# Patient Record
Sex: MALE | Race: WHITE | NOT HISPANIC OR LATINO | URBAN - METROPOLITAN AREA
[De-identification: names, ages, dates, MRNs, and addresses within clinical notes are randomized per-mention and may not be internally consistent; named-entity substitution may affect disease eponyms.]

---

## 2017-01-04 ENCOUNTER — OUTPATIENT (OUTPATIENT)
Dept: OUTPATIENT SERVICES | Facility: HOSPITAL | Age: 72
LOS: 1 days | End: 2017-01-04
Payer: MEDICARE

## 2017-01-04 DIAGNOSIS — H26.8 OTHER SPECIFIED CATARACT: Chronic | ICD-10-CM

## 2017-01-04 DIAGNOSIS — G70.00 MYASTHENIA GRAVIS WITHOUT (ACUTE) EXACERBATION: ICD-10-CM

## 2017-01-04 LAB
HCT VFR BLD CALC: 44.1 % — SIGNIFICANT CHANGE UP (ref 39–50)
HGB BLD-MCNC: 14.8 G/DL — SIGNIFICANT CHANGE UP (ref 13–17)
MCHC RBC-ENTMCNC: 32.9 PG — SIGNIFICANT CHANGE UP (ref 27–34)
MCHC RBC-ENTMCNC: 33.5 GM/DL — SIGNIFICANT CHANGE UP (ref 32–36)
MCV RBC AUTO: 98.2 FL — SIGNIFICANT CHANGE UP (ref 80–100)
PLATELET # BLD AUTO: 234 K/UL — SIGNIFICANT CHANGE UP (ref 150–400)
RBC # BLD: 4.48 M/UL — SIGNIFICANT CHANGE UP (ref 4.2–5.8)
RBC # FLD: 13.3 % — SIGNIFICANT CHANGE UP (ref 10.3–14.5)
WBC # BLD: 12.7 K/UL — HIGH (ref 3.8–10.5)
WBC # FLD AUTO: 12.7 K/UL — HIGH (ref 3.8–10.5)

## 2017-01-04 PROCEDURE — P9041: CPT

## 2017-01-04 PROCEDURE — 99214 OFFICE O/P EST MOD 30 MIN: CPT | Mod: 25

## 2017-01-04 PROCEDURE — 36430 TRANSFUSION BLD/BLD COMPNT: CPT

## 2017-01-04 PROCEDURE — 85027 COMPLETE CBC AUTOMATED: CPT

## 2017-01-04 PROCEDURE — P9045: CPT

## 2017-01-04 PROCEDURE — 36514 APHERESIS PLASMA: CPT

## 2017-01-06 ENCOUNTER — OUTPATIENT (OUTPATIENT)
Dept: OUTPATIENT SERVICES | Facility: HOSPITAL | Age: 72
LOS: 1 days | End: 2017-01-06
Payer: MEDICARE

## 2017-01-06 DIAGNOSIS — G70.00 MYASTHENIA GRAVIS WITHOUT (ACUTE) EXACERBATION: ICD-10-CM

## 2017-01-06 DIAGNOSIS — H26.8 OTHER SPECIFIED CATARACT: Chronic | ICD-10-CM

## 2017-01-06 LAB
HCT VFR BLD CALC: 42.9 % — SIGNIFICANT CHANGE UP (ref 39–50)
HGB BLD-MCNC: 15.3 G/DL — SIGNIFICANT CHANGE UP (ref 13–17)
MCHC RBC-ENTMCNC: 35.1 PG — HIGH (ref 27–34)
MCHC RBC-ENTMCNC: 35.6 GM/DL — SIGNIFICANT CHANGE UP (ref 32–36)
MCV RBC AUTO: 98.5 FL — SIGNIFICANT CHANGE UP (ref 80–100)
PLATELET # BLD AUTO: 204 K/UL — SIGNIFICANT CHANGE UP (ref 150–400)
RBC # BLD: 4.35 M/UL — SIGNIFICANT CHANGE UP (ref 4.2–5.8)
RBC # FLD: 13.4 % — SIGNIFICANT CHANGE UP (ref 10.3–14.5)
WBC # BLD: 15.2 K/UL — HIGH (ref 3.8–10.5)
WBC # FLD AUTO: 15.2 K/UL — HIGH (ref 3.8–10.5)

## 2017-01-06 PROCEDURE — 99214 OFFICE O/P EST MOD 30 MIN: CPT | Mod: 25

## 2017-01-06 PROCEDURE — 85027 COMPLETE CBC AUTOMATED: CPT

## 2017-01-06 PROCEDURE — P9041: CPT

## 2017-01-06 PROCEDURE — P9045: CPT

## 2017-01-06 PROCEDURE — 36514 APHERESIS PLASMA: CPT

## 2017-01-09 ENCOUNTER — OUTPATIENT (OUTPATIENT)
Dept: OUTPATIENT SERVICES | Facility: HOSPITAL | Age: 72
LOS: 1 days | End: 2017-01-09
Payer: MEDICARE

## 2017-01-09 DIAGNOSIS — G70.00 MYASTHENIA GRAVIS WITHOUT (ACUTE) EXACERBATION: ICD-10-CM

## 2017-01-09 DIAGNOSIS — H26.8 OTHER SPECIFIED CATARACT: Chronic | ICD-10-CM

## 2017-01-09 LAB
HCT VFR BLD CALC: 42 % — SIGNIFICANT CHANGE UP (ref 39–50)
HGB BLD-MCNC: 14.4 G/DL — SIGNIFICANT CHANGE UP (ref 13–17)
MCHC RBC-ENTMCNC: 34 PG — SIGNIFICANT CHANGE UP (ref 27–34)
MCHC RBC-ENTMCNC: 34.3 GM/DL — SIGNIFICANT CHANGE UP (ref 32–36)
MCV RBC AUTO: 99 FL — SIGNIFICANT CHANGE UP (ref 80–100)
PLATELET # BLD AUTO: 189 K/UL — SIGNIFICANT CHANGE UP (ref 150–400)
RBC # BLD: 4.24 M/UL — SIGNIFICANT CHANGE UP (ref 4.2–5.8)
RBC # FLD: 13.6 % — SIGNIFICANT CHANGE UP (ref 10.3–14.5)
WBC # BLD: 12.5 K/UL — HIGH (ref 3.8–10.5)
WBC # FLD AUTO: 12.5 K/UL — HIGH (ref 3.8–10.5)

## 2017-01-09 PROCEDURE — 99214 OFFICE O/P EST MOD 30 MIN: CPT | Mod: 25

## 2017-01-09 PROCEDURE — 85027 COMPLETE CBC AUTOMATED: CPT

## 2017-01-09 PROCEDURE — P9045: CPT

## 2017-01-09 PROCEDURE — P9041: CPT

## 2017-01-09 PROCEDURE — 36514 APHERESIS PLASMA: CPT

## 2017-01-23 ENCOUNTER — OUTPATIENT (OUTPATIENT)
Dept: OUTPATIENT SERVICES | Facility: HOSPITAL | Age: 72
LOS: 1 days | End: 2017-01-23
Payer: MEDICARE

## 2017-01-23 DIAGNOSIS — G70.00 MYASTHENIA GRAVIS WITHOUT (ACUTE) EXACERBATION: ICD-10-CM

## 2017-01-23 DIAGNOSIS — H26.8 OTHER SPECIFIED CATARACT: Chronic | ICD-10-CM

## 2017-01-23 LAB
HCT VFR BLD CALC: 39.6 % — SIGNIFICANT CHANGE UP (ref 39–50)
HGB BLD-MCNC: 13.5 G/DL — SIGNIFICANT CHANGE UP (ref 13–17)
MCHC RBC-ENTMCNC: 33.7 PG — SIGNIFICANT CHANGE UP (ref 27–34)
MCHC RBC-ENTMCNC: 34 GM/DL — SIGNIFICANT CHANGE UP (ref 32–36)
MCV RBC AUTO: 99.1 FL — SIGNIFICANT CHANGE UP (ref 80–100)
PLATELET # BLD AUTO: 216 K/UL — SIGNIFICANT CHANGE UP (ref 150–400)
RBC # BLD: 4 M/UL — LOW (ref 4.2–5.8)
RBC # FLD: 13.5 % — SIGNIFICANT CHANGE UP (ref 10.3–14.5)
WBC # BLD: 11.8 K/UL — HIGH (ref 3.8–10.5)
WBC # FLD AUTO: 11.8 K/UL — HIGH (ref 3.8–10.5)

## 2017-01-23 PROCEDURE — P9041: CPT

## 2017-01-23 PROCEDURE — 99214 OFFICE O/P EST MOD 30 MIN: CPT | Mod: 25

## 2017-01-23 PROCEDURE — 85027 COMPLETE CBC AUTOMATED: CPT

## 2017-01-23 PROCEDURE — 36514 APHERESIS PLASMA: CPT

## 2017-01-23 PROCEDURE — P9045: CPT

## 2017-01-25 ENCOUNTER — OUTPATIENT (OUTPATIENT)
Dept: OUTPATIENT SERVICES | Facility: HOSPITAL | Age: 72
LOS: 1 days | End: 2017-01-25
Payer: MEDICARE

## 2017-01-25 DIAGNOSIS — H26.8 OTHER SPECIFIED CATARACT: Chronic | ICD-10-CM

## 2017-01-25 DIAGNOSIS — G70.00 MYASTHENIA GRAVIS WITHOUT (ACUTE) EXACERBATION: ICD-10-CM

## 2017-01-25 LAB
HCT VFR BLD CALC: 44.1 % — SIGNIFICANT CHANGE UP (ref 39–50)
HGB BLD-MCNC: 14.3 G/DL — SIGNIFICANT CHANGE UP (ref 13–17)
MCHC RBC-ENTMCNC: 32.3 PG — SIGNIFICANT CHANGE UP (ref 27–34)
MCHC RBC-ENTMCNC: 32.5 GM/DL — SIGNIFICANT CHANGE UP (ref 32–36)
MCV RBC AUTO: 99.3 FL — SIGNIFICANT CHANGE UP (ref 80–100)
PLATELET # BLD AUTO: 235 K/UL — SIGNIFICANT CHANGE UP (ref 150–400)
RBC # BLD: 4.44 M/UL — SIGNIFICANT CHANGE UP (ref 4.2–5.8)
RBC # FLD: 13.8 % — SIGNIFICANT CHANGE UP (ref 10.3–14.5)
WBC # BLD: 12.8 K/UL — HIGH (ref 3.8–10.5)
WBC # FLD AUTO: 12.8 K/UL — HIGH (ref 3.8–10.5)

## 2017-01-25 PROCEDURE — 99214 OFFICE O/P EST MOD 30 MIN: CPT | Mod: 25

## 2017-01-25 PROCEDURE — 85027 COMPLETE CBC AUTOMATED: CPT

## 2017-01-25 PROCEDURE — 36514 APHERESIS PLASMA: CPT

## 2017-01-25 PROCEDURE — P9041: CPT

## 2017-01-27 ENCOUNTER — OUTPATIENT (OUTPATIENT)
Dept: OUTPATIENT SERVICES | Facility: HOSPITAL | Age: 72
LOS: 1 days | End: 2017-01-27
Payer: MEDICARE

## 2017-01-27 DIAGNOSIS — G70.00 MYASTHENIA GRAVIS WITHOUT (ACUTE) EXACERBATION: ICD-10-CM

## 2017-01-27 DIAGNOSIS — H26.8 OTHER SPECIFIED CATARACT: Chronic | ICD-10-CM

## 2017-01-27 LAB
HCT VFR BLD CALC: 40.8 % — SIGNIFICANT CHANGE UP (ref 39–50)
HGB BLD-MCNC: 13.9 G/DL — SIGNIFICANT CHANGE UP (ref 13–17)
MCHC RBC-ENTMCNC: 33.2 PG — SIGNIFICANT CHANGE UP (ref 27–34)
MCHC RBC-ENTMCNC: 33.9 GM/DL — SIGNIFICANT CHANGE UP (ref 32–36)
MCV RBC AUTO: 97.9 FL — SIGNIFICANT CHANGE UP (ref 80–100)
PLATELET # BLD AUTO: 226 K/UL — SIGNIFICANT CHANGE UP (ref 150–400)
RBC # BLD: 4.17 M/UL — LOW (ref 4.2–5.8)
RBC # FLD: 13.1 % — SIGNIFICANT CHANGE UP (ref 10.3–14.5)
WBC # BLD: 13.6 K/UL — HIGH (ref 3.8–10.5)
WBC # FLD AUTO: 13.6 K/UL — HIGH (ref 3.8–10.5)

## 2017-01-27 PROCEDURE — 36514 APHERESIS PLASMA: CPT

## 2017-01-27 PROCEDURE — P9041: CPT

## 2017-01-27 PROCEDURE — 85027 COMPLETE CBC AUTOMATED: CPT

## 2017-01-27 PROCEDURE — 99214 OFFICE O/P EST MOD 30 MIN: CPT | Mod: 25

## 2017-02-10 ENCOUNTER — OUTPATIENT (OUTPATIENT)
Dept: OUTPATIENT SERVICES | Facility: HOSPITAL | Age: 72
LOS: 1 days | End: 2017-02-10
Payer: MEDICARE

## 2017-02-10 DIAGNOSIS — G70.00 MYASTHENIA GRAVIS WITHOUT (ACUTE) EXACERBATION: ICD-10-CM

## 2017-02-10 DIAGNOSIS — H26.8 OTHER SPECIFIED CATARACT: Chronic | ICD-10-CM

## 2017-02-10 LAB
HCT VFR BLD CALC: 40.5 % — SIGNIFICANT CHANGE UP (ref 39–50)
HGB BLD-MCNC: 13.5 G/DL — SIGNIFICANT CHANGE UP (ref 13–17)
MCHC RBC-ENTMCNC: 32.8 PG — SIGNIFICANT CHANGE UP (ref 27–34)
MCHC RBC-ENTMCNC: 33.2 GM/DL — SIGNIFICANT CHANGE UP (ref 32–36)
MCV RBC AUTO: 98.6 FL — SIGNIFICANT CHANGE UP (ref 80–100)
PLATELET # BLD AUTO: 238 K/UL — SIGNIFICANT CHANGE UP (ref 150–400)
RBC # BLD: 4.1 M/UL — LOW (ref 4.2–5.8)
RBC # FLD: 13 % — SIGNIFICANT CHANGE UP (ref 10.3–14.5)
WBC # BLD: 9.8 K/UL — SIGNIFICANT CHANGE UP (ref 3.8–10.5)
WBC # FLD AUTO: 9.8 K/UL — SIGNIFICANT CHANGE UP (ref 3.8–10.5)

## 2017-02-10 PROCEDURE — 36514 APHERESIS PLASMA: CPT

## 2017-02-10 PROCEDURE — 99214 OFFICE O/P EST MOD 30 MIN: CPT | Mod: 25

## 2017-02-10 PROCEDURE — P9041: CPT

## 2017-02-10 PROCEDURE — 85027 COMPLETE CBC AUTOMATED: CPT

## 2017-02-13 ENCOUNTER — OUTPATIENT (OUTPATIENT)
Dept: OUTPATIENT SERVICES | Facility: HOSPITAL | Age: 72
LOS: 1 days | End: 2017-02-13
Payer: MEDICARE

## 2017-02-13 DIAGNOSIS — H26.8 OTHER SPECIFIED CATARACT: Chronic | ICD-10-CM

## 2017-02-13 DIAGNOSIS — G70.00 MYASTHENIA GRAVIS WITHOUT (ACUTE) EXACERBATION: ICD-10-CM

## 2017-02-13 LAB
HCT VFR BLD CALC: 43.2 % — SIGNIFICANT CHANGE UP (ref 39–50)
HGB BLD-MCNC: 14.1 G/DL — SIGNIFICANT CHANGE UP (ref 13–17)
MCHC RBC-ENTMCNC: 32 PG — SIGNIFICANT CHANGE UP (ref 27–34)
MCHC RBC-ENTMCNC: 32.6 GM/DL — SIGNIFICANT CHANGE UP (ref 32–36)
MCV RBC AUTO: 98.2 FL — SIGNIFICANT CHANGE UP (ref 80–100)
PLATELET # BLD AUTO: 234 K/UL — SIGNIFICANT CHANGE UP (ref 150–400)
RBC # BLD: 4.39 M/UL — SIGNIFICANT CHANGE UP (ref 4.2–5.8)
RBC # FLD: 13.7 % — SIGNIFICANT CHANGE UP (ref 10.3–14.5)
WBC # BLD: 11.4 K/UL — HIGH (ref 3.8–10.5)
WBC # FLD AUTO: 11.4 K/UL — HIGH (ref 3.8–10.5)

## 2017-02-13 PROCEDURE — 85027 COMPLETE CBC AUTOMATED: CPT

## 2017-02-13 PROCEDURE — P9045: CPT

## 2017-02-13 PROCEDURE — P9041: CPT

## 2017-02-13 PROCEDURE — 99214 OFFICE O/P EST MOD 30 MIN: CPT | Mod: 25

## 2017-02-13 PROCEDURE — 36514 APHERESIS PLASMA: CPT

## 2017-02-15 ENCOUNTER — OUTPATIENT (OUTPATIENT)
Dept: OUTPATIENT SERVICES | Facility: HOSPITAL | Age: 72
LOS: 1 days | End: 2017-02-15
Payer: MEDICARE

## 2017-02-15 DIAGNOSIS — H26.8 OTHER SPECIFIED CATARACT: Chronic | ICD-10-CM

## 2017-02-15 DIAGNOSIS — G70.00 MYASTHENIA GRAVIS WITHOUT (ACUTE) EXACERBATION: ICD-10-CM

## 2017-02-15 LAB
HCT VFR BLD CALC: 42.1 % — SIGNIFICANT CHANGE UP (ref 39–50)
HGB BLD-MCNC: 13.9 G/DL — SIGNIFICANT CHANGE UP (ref 13–17)
MCHC RBC-ENTMCNC: 32.4 PG — SIGNIFICANT CHANGE UP (ref 27–34)
MCHC RBC-ENTMCNC: 33.1 GM/DL — SIGNIFICANT CHANGE UP (ref 32–36)
MCV RBC AUTO: 98 FL — SIGNIFICANT CHANGE UP (ref 80–100)
PLATELET # BLD AUTO: 225 K/UL — SIGNIFICANT CHANGE UP (ref 150–400)
RBC # BLD: 4.29 M/UL — SIGNIFICANT CHANGE UP (ref 4.2–5.8)
RBC # FLD: 13.4 % — SIGNIFICANT CHANGE UP (ref 10.3–14.5)
WBC # BLD: 12.4 K/UL — HIGH (ref 3.8–10.5)
WBC # FLD AUTO: 12.4 K/UL — HIGH (ref 3.8–10.5)

## 2017-02-15 PROCEDURE — P9041: CPT

## 2017-02-15 PROCEDURE — 99214 OFFICE O/P EST MOD 30 MIN: CPT | Mod: 25

## 2017-02-15 PROCEDURE — 36514 APHERESIS PLASMA: CPT

## 2017-02-15 PROCEDURE — 85027 COMPLETE CBC AUTOMATED: CPT

## 2017-03-01 ENCOUNTER — OUTPATIENT (OUTPATIENT)
Dept: OUTPATIENT SERVICES | Facility: HOSPITAL | Age: 72
LOS: 1 days | End: 2017-03-01
Payer: MEDICARE

## 2017-03-01 DIAGNOSIS — H26.8 OTHER SPECIFIED CATARACT: Chronic | ICD-10-CM

## 2017-03-01 DIAGNOSIS — G70.00 MYASTHENIA GRAVIS WITHOUT (ACUTE) EXACERBATION: ICD-10-CM

## 2017-03-01 LAB
HCT VFR BLD CALC: 40.5 % — SIGNIFICANT CHANGE UP (ref 39–50)
HGB BLD-MCNC: 13.3 G/DL — SIGNIFICANT CHANGE UP (ref 13–17)
MCHC RBC-ENTMCNC: 32.2 PG — SIGNIFICANT CHANGE UP (ref 27–34)
MCHC RBC-ENTMCNC: 32.9 GM/DL — SIGNIFICANT CHANGE UP (ref 32–36)
MCV RBC AUTO: 97.9 FL — SIGNIFICANT CHANGE UP (ref 80–100)
PLATELET # BLD AUTO: 233 K/UL — SIGNIFICANT CHANGE UP (ref 150–400)
RBC # BLD: 4.14 M/UL — LOW (ref 4.2–5.8)
RBC # FLD: 13.1 % — SIGNIFICANT CHANGE UP (ref 10.3–14.5)
WBC # BLD: 12.6 K/UL — HIGH (ref 3.8–10.5)
WBC # FLD AUTO: 12.6 K/UL — HIGH (ref 3.8–10.5)

## 2017-03-01 PROCEDURE — 99214 OFFICE O/P EST MOD 30 MIN: CPT | Mod: 25

## 2017-03-01 PROCEDURE — P9041: CPT

## 2017-03-01 PROCEDURE — P9045: CPT

## 2017-03-01 PROCEDURE — 85027 COMPLETE CBC AUTOMATED: CPT

## 2017-03-01 PROCEDURE — 36514 APHERESIS PLASMA: CPT

## 2017-03-03 ENCOUNTER — OUTPATIENT (OUTPATIENT)
Dept: OUTPATIENT SERVICES | Facility: HOSPITAL | Age: 72
LOS: 1 days | End: 2017-03-03
Payer: MEDICARE

## 2017-03-03 DIAGNOSIS — H26.8 OTHER SPECIFIED CATARACT: Chronic | ICD-10-CM

## 2017-03-03 DIAGNOSIS — G70.00 MYASTHENIA GRAVIS WITHOUT (ACUTE) EXACERBATION: ICD-10-CM

## 2017-03-03 LAB
HCT VFR BLD CALC: 40.1 % — SIGNIFICANT CHANGE UP (ref 39–50)
HGB BLD-MCNC: 13.7 G/DL — SIGNIFICANT CHANGE UP (ref 13–17)
MCHC RBC-ENTMCNC: 33.2 PG — SIGNIFICANT CHANGE UP (ref 27–34)
MCHC RBC-ENTMCNC: 34.2 GM/DL — SIGNIFICANT CHANGE UP (ref 32–36)
MCV RBC AUTO: 97 FL — SIGNIFICANT CHANGE UP (ref 80–100)
PLATELET # BLD AUTO: 243 K/UL — SIGNIFICANT CHANGE UP (ref 150–400)
RBC # BLD: 4.14 M/UL — LOW (ref 4.2–5.8)
RBC # FLD: 13.2 % — SIGNIFICANT CHANGE UP (ref 10.3–14.5)
WBC # BLD: 11.3 K/UL — HIGH (ref 3.8–10.5)
WBC # FLD AUTO: 11.3 K/UL — HIGH (ref 3.8–10.5)

## 2017-03-03 PROCEDURE — 36514 APHERESIS PLASMA: CPT

## 2017-03-03 PROCEDURE — 85027 COMPLETE CBC AUTOMATED: CPT

## 2017-03-03 PROCEDURE — P9041: CPT

## 2017-03-03 PROCEDURE — 99213 OFFICE O/P EST LOW 20 MIN: CPT | Mod: 25

## 2017-03-06 ENCOUNTER — OUTPATIENT (OUTPATIENT)
Dept: OUTPATIENT SERVICES | Facility: HOSPITAL | Age: 72
LOS: 1 days | End: 2017-03-06
Payer: MEDICARE

## 2017-03-06 DIAGNOSIS — H26.8 OTHER SPECIFIED CATARACT: Chronic | ICD-10-CM

## 2017-03-06 DIAGNOSIS — G70.00 MYASTHENIA GRAVIS WITHOUT (ACUTE) EXACERBATION: ICD-10-CM

## 2017-03-06 LAB
HCT VFR BLD CALC: 40.6 % — SIGNIFICANT CHANGE UP (ref 39–50)
HGB BLD-MCNC: 13.8 G/DL — SIGNIFICANT CHANGE UP (ref 13–17)
MCHC RBC-ENTMCNC: 33.5 PG — SIGNIFICANT CHANGE UP (ref 27–34)
MCHC RBC-ENTMCNC: 34.1 GM/DL — SIGNIFICANT CHANGE UP (ref 32–36)
MCV RBC AUTO: 98.2 FL — SIGNIFICANT CHANGE UP (ref 80–100)
PLATELET # BLD AUTO: 250 K/UL — SIGNIFICANT CHANGE UP (ref 150–400)
RBC # BLD: 4.13 M/UL — LOW (ref 4.2–5.8)
RBC # FLD: 12.9 % — SIGNIFICANT CHANGE UP (ref 10.3–14.5)
WBC # BLD: 14.2 K/UL — HIGH (ref 3.8–10.5)
WBC # FLD AUTO: 14.2 K/UL — HIGH (ref 3.8–10.5)

## 2017-03-06 PROCEDURE — 36514 APHERESIS PLASMA: CPT

## 2017-03-06 PROCEDURE — 99213 OFFICE O/P EST LOW 20 MIN: CPT | Mod: 25

## 2017-03-07 PROCEDURE — P9041: CPT

## 2017-03-07 PROCEDURE — 85027 COMPLETE CBC AUTOMATED: CPT

## 2017-03-07 PROCEDURE — P9045: CPT

## 2017-03-07 PROCEDURE — 36514 APHERESIS PLASMA: CPT

## 2017-03-20 ENCOUNTER — OUTPATIENT (OUTPATIENT)
Dept: OUTPATIENT SERVICES | Facility: HOSPITAL | Age: 72
LOS: 1 days | End: 2017-03-20
Payer: MEDICARE

## 2017-03-20 DIAGNOSIS — H26.8 OTHER SPECIFIED CATARACT: Chronic | ICD-10-CM

## 2017-03-20 DIAGNOSIS — G70.00 MYASTHENIA GRAVIS WITHOUT (ACUTE) EXACERBATION: ICD-10-CM

## 2017-03-20 LAB
HCT VFR BLD CALC: 41.2 % — SIGNIFICANT CHANGE UP (ref 39–50)
HGB BLD-MCNC: 13.6 G/DL — SIGNIFICANT CHANGE UP (ref 13–17)
MCHC RBC-ENTMCNC: 32.7 PG — SIGNIFICANT CHANGE UP (ref 27–34)
MCHC RBC-ENTMCNC: 33 GM/DL — SIGNIFICANT CHANGE UP (ref 32–36)
MCV RBC AUTO: 99 FL — SIGNIFICANT CHANGE UP (ref 80–100)
PLATELET # BLD AUTO: 252 K/UL — SIGNIFICANT CHANGE UP (ref 150–400)
RBC # BLD: 4.16 M/UL — LOW (ref 4.2–5.8)
RBC # FLD: 13.1 % — SIGNIFICANT CHANGE UP (ref 10.3–14.5)
WBC # BLD: 11.9 K/UL — HIGH (ref 3.8–10.5)
WBC # FLD AUTO: 11.9 K/UL — HIGH (ref 3.8–10.5)

## 2017-03-20 PROCEDURE — 99214 OFFICE O/P EST MOD 30 MIN: CPT | Mod: 25

## 2017-03-20 PROCEDURE — 36514 APHERESIS PLASMA: CPT

## 2017-03-20 PROCEDURE — P9045: CPT

## 2017-03-20 PROCEDURE — 85027 COMPLETE CBC AUTOMATED: CPT

## 2017-03-20 PROCEDURE — P9041: CPT

## 2017-03-22 ENCOUNTER — OUTPATIENT (OUTPATIENT)
Dept: OUTPATIENT SERVICES | Facility: HOSPITAL | Age: 72
LOS: 1 days | End: 2017-03-22
Payer: MEDICARE

## 2017-03-22 DIAGNOSIS — G70.00 MYASTHENIA GRAVIS WITHOUT (ACUTE) EXACERBATION: ICD-10-CM

## 2017-03-22 DIAGNOSIS — H26.8 OTHER SPECIFIED CATARACT: Chronic | ICD-10-CM

## 2017-03-22 LAB
HCT VFR BLD CALC: 40.7 % — SIGNIFICANT CHANGE UP (ref 39–50)
HGB BLD-MCNC: 13.8 G/DL — SIGNIFICANT CHANGE UP (ref 13–17)
MCHC RBC-ENTMCNC: 33.5 PG — SIGNIFICANT CHANGE UP (ref 27–34)
MCHC RBC-ENTMCNC: 33.8 GM/DL — SIGNIFICANT CHANGE UP (ref 32–36)
MCV RBC AUTO: 99 FL — SIGNIFICANT CHANGE UP (ref 80–100)
PLATELET # BLD AUTO: 221 K/UL — SIGNIFICANT CHANGE UP (ref 150–400)
RBC # BLD: 4.11 M/UL — LOW (ref 4.2–5.8)
RBC # FLD: 13.6 % — SIGNIFICANT CHANGE UP (ref 10.3–14.5)
WBC # BLD: 13.6 K/UL — HIGH (ref 3.8–10.5)
WBC # FLD AUTO: 13.6 K/UL — HIGH (ref 3.8–10.5)

## 2017-03-22 PROCEDURE — 36514 APHERESIS PLASMA: CPT

## 2017-03-22 PROCEDURE — P9045: CPT

## 2017-03-22 PROCEDURE — 99214 OFFICE O/P EST MOD 30 MIN: CPT | Mod: 25

## 2017-03-22 PROCEDURE — P9041: CPT

## 2017-03-22 PROCEDURE — 85027 COMPLETE CBC AUTOMATED: CPT

## 2017-03-24 ENCOUNTER — OUTPATIENT (OUTPATIENT)
Dept: OUTPATIENT SERVICES | Facility: HOSPITAL | Age: 72
LOS: 1 days | End: 2017-03-24
Payer: MEDICARE

## 2017-03-24 DIAGNOSIS — H26.8 OTHER SPECIFIED CATARACT: Chronic | ICD-10-CM

## 2017-03-24 DIAGNOSIS — G70.00 MYASTHENIA GRAVIS WITHOUT (ACUTE) EXACERBATION: ICD-10-CM

## 2017-03-24 LAB
HCT VFR BLD CALC: 41.9 % — SIGNIFICANT CHANGE UP (ref 39–50)
HGB BLD-MCNC: 13.8 G/DL — SIGNIFICANT CHANGE UP (ref 13–17)
MCHC RBC-ENTMCNC: 32.6 PG — SIGNIFICANT CHANGE UP (ref 27–34)
MCHC RBC-ENTMCNC: 32.9 GM/DL — SIGNIFICANT CHANGE UP (ref 32–36)
MCV RBC AUTO: 99 FL — SIGNIFICANT CHANGE UP (ref 80–100)
PLATELET # BLD AUTO: 223 K/UL — SIGNIFICANT CHANGE UP (ref 150–400)
RBC # BLD: 4.23 M/UL — SIGNIFICANT CHANGE UP (ref 4.2–5.8)
RBC # FLD: 13.5 % — SIGNIFICANT CHANGE UP (ref 10.3–14.5)
WBC # BLD: 16.6 K/UL — HIGH (ref 3.8–10.5)
WBC # FLD AUTO: 16.6 K/UL — HIGH (ref 3.8–10.5)

## 2017-03-24 PROCEDURE — P9041: CPT

## 2017-03-24 PROCEDURE — 99214 OFFICE O/P EST MOD 30 MIN: CPT | Mod: 25

## 2017-03-24 PROCEDURE — 36514 APHERESIS PLASMA: CPT

## 2017-03-24 PROCEDURE — 85027 COMPLETE CBC AUTOMATED: CPT

## 2017-03-24 PROCEDURE — P9045: CPT

## 2017-04-12 ENCOUNTER — OUTPATIENT (OUTPATIENT)
Dept: OUTPATIENT SERVICES | Facility: HOSPITAL | Age: 72
LOS: 1 days | End: 2017-04-12
Payer: MEDICARE

## 2017-04-12 DIAGNOSIS — G70.00 MYASTHENIA GRAVIS WITHOUT (ACUTE) EXACERBATION: ICD-10-CM

## 2017-04-12 DIAGNOSIS — H26.8 OTHER SPECIFIED CATARACT: Chronic | ICD-10-CM

## 2017-04-12 LAB
HCT VFR BLD CALC: 40.6 % — SIGNIFICANT CHANGE UP (ref 39–50)
HGB BLD-MCNC: 13.4 G/DL — SIGNIFICANT CHANGE UP (ref 13–17)
MCHC RBC-ENTMCNC: 32.3 PG — SIGNIFICANT CHANGE UP (ref 27–34)
MCHC RBC-ENTMCNC: 33.1 GM/DL — SIGNIFICANT CHANGE UP (ref 32–36)
MCV RBC AUTO: 97.6 FL — SIGNIFICANT CHANGE UP (ref 80–100)
PLATELET # BLD AUTO: 219 K/UL — SIGNIFICANT CHANGE UP (ref 150–400)
RBC # BLD: 4.16 M/UL — LOW (ref 4.2–5.8)
RBC # FLD: 13.4 % — SIGNIFICANT CHANGE UP (ref 10.3–14.5)
WBC # BLD: 15.7 K/UL — HIGH (ref 3.8–10.5)
WBC # FLD AUTO: 15.7 K/UL — HIGH (ref 3.8–10.5)

## 2017-04-12 PROCEDURE — P9041: CPT

## 2017-04-12 PROCEDURE — 36514 APHERESIS PLASMA: CPT

## 2017-04-12 PROCEDURE — 85027 COMPLETE CBC AUTOMATED: CPT

## 2017-04-12 PROCEDURE — 99214 OFFICE O/P EST MOD 30 MIN: CPT | Mod: 25

## 2017-04-14 ENCOUNTER — OUTPATIENT (OUTPATIENT)
Dept: OUTPATIENT SERVICES | Facility: HOSPITAL | Age: 72
LOS: 1 days | End: 2017-04-14
Payer: MEDICARE

## 2017-04-14 DIAGNOSIS — H26.8 OTHER SPECIFIED CATARACT: Chronic | ICD-10-CM

## 2017-04-14 DIAGNOSIS — G70.00 MYASTHENIA GRAVIS WITHOUT (ACUTE) EXACERBATION: ICD-10-CM

## 2017-04-14 LAB
HCT VFR BLD CALC: 38.1 % — LOW (ref 39–50)
HGB BLD-MCNC: 13.2 G/DL — SIGNIFICANT CHANGE UP (ref 13–17)
MCHC RBC-ENTMCNC: 34.1 PG — HIGH (ref 27–34)
MCHC RBC-ENTMCNC: 34.7 GM/DL — SIGNIFICANT CHANGE UP (ref 32–36)
MCV RBC AUTO: 98.2 FL — SIGNIFICANT CHANGE UP (ref 80–100)
PLATELET # BLD AUTO: 202 K/UL — SIGNIFICANT CHANGE UP (ref 150–400)
RBC # BLD: 3.88 M/UL — LOW (ref 4.2–5.8)
RBC # FLD: 12.7 % — SIGNIFICANT CHANGE UP (ref 10.3–14.5)
WBC # BLD: 13.4 K/UL — HIGH (ref 3.8–10.5)
WBC # FLD AUTO: 13.4 K/UL — HIGH (ref 3.8–10.5)

## 2017-04-14 PROCEDURE — 99213 OFFICE O/P EST LOW 20 MIN: CPT | Mod: 25

## 2017-04-14 PROCEDURE — 36514 APHERESIS PLASMA: CPT

## 2017-04-14 PROCEDURE — P9041: CPT

## 2017-04-14 PROCEDURE — 85027 COMPLETE CBC AUTOMATED: CPT

## 2017-04-17 ENCOUNTER — OUTPATIENT (OUTPATIENT)
Dept: OUTPATIENT SERVICES | Facility: HOSPITAL | Age: 72
LOS: 1 days | End: 2017-04-17
Payer: MEDICARE

## 2017-04-17 DIAGNOSIS — H26.8 OTHER SPECIFIED CATARACT: Chronic | ICD-10-CM

## 2017-04-17 DIAGNOSIS — G70.00 MYASTHENIA GRAVIS WITHOUT (ACUTE) EXACERBATION: ICD-10-CM

## 2017-04-17 DIAGNOSIS — R05 COUGH: ICD-10-CM

## 2017-04-17 LAB
HCT VFR BLD CALC: 41.7 % — SIGNIFICANT CHANGE UP (ref 39–50)
HGB BLD-MCNC: 14.2 G/DL — SIGNIFICANT CHANGE UP (ref 13–17)
MCHC RBC-ENTMCNC: 33.5 PG — SIGNIFICANT CHANGE UP (ref 27–34)
MCHC RBC-ENTMCNC: 34 GM/DL — SIGNIFICANT CHANGE UP (ref 32–36)
MCV RBC AUTO: 98.4 FL — SIGNIFICANT CHANGE UP (ref 80–100)
PLATELET # BLD AUTO: 228 K/UL — SIGNIFICANT CHANGE UP (ref 150–400)
RBC # BLD: 4.24 M/UL — SIGNIFICANT CHANGE UP (ref 4.2–5.8)
RBC # FLD: 12.8 % — SIGNIFICANT CHANGE UP (ref 10.3–14.5)
WBC # BLD: 12.7 K/UL — HIGH (ref 3.8–10.5)
WBC # FLD AUTO: 12.7 K/UL — HIGH (ref 3.8–10.5)

## 2017-04-17 PROCEDURE — 85027 COMPLETE CBC AUTOMATED: CPT

## 2017-04-17 PROCEDURE — 36514 APHERESIS PLASMA: CPT

## 2017-04-17 PROCEDURE — 99214 OFFICE O/P EST MOD 30 MIN: CPT | Mod: 25

## 2017-04-17 PROCEDURE — P9041: CPT

## 2017-05-05 ENCOUNTER — OUTPATIENT (OUTPATIENT)
Dept: OUTPATIENT SERVICES | Facility: HOSPITAL | Age: 72
LOS: 1 days | End: 2017-05-05
Payer: MEDICARE

## 2017-05-05 DIAGNOSIS — G70.00 MYASTHENIA GRAVIS WITHOUT (ACUTE) EXACERBATION: ICD-10-CM

## 2017-05-05 DIAGNOSIS — H26.8 OTHER SPECIFIED CATARACT: Chronic | ICD-10-CM

## 2017-05-05 LAB
HCT VFR BLD CALC: 40.7 % — SIGNIFICANT CHANGE UP (ref 39–50)
HGB BLD-MCNC: 13.9 G/DL — SIGNIFICANT CHANGE UP (ref 13–17)
MCHC RBC-ENTMCNC: 33.1 PG — SIGNIFICANT CHANGE UP (ref 27–34)
MCHC RBC-ENTMCNC: 34.1 GM/DL — SIGNIFICANT CHANGE UP (ref 32–36)
MCV RBC AUTO: 97 FL — SIGNIFICANT CHANGE UP (ref 80–100)
PLATELET # BLD AUTO: 230 K/UL — SIGNIFICANT CHANGE UP (ref 150–400)
RBC # BLD: 4.2 M/UL — SIGNIFICANT CHANGE UP (ref 4.2–5.8)
RBC # FLD: 12.7 % — SIGNIFICANT CHANGE UP (ref 10.3–14.5)
WBC # BLD: 12.4 K/UL — HIGH (ref 3.8–10.5)
WBC # FLD AUTO: 12.4 K/UL — HIGH (ref 3.8–10.5)

## 2017-05-05 PROCEDURE — 36514 APHERESIS PLASMA: CPT

## 2017-05-05 PROCEDURE — 85027 COMPLETE CBC AUTOMATED: CPT

## 2017-05-05 PROCEDURE — P9041: CPT

## 2017-05-05 PROCEDURE — P9045: CPT

## 2017-05-05 PROCEDURE — 99214 OFFICE O/P EST MOD 30 MIN: CPT | Mod: 25

## 2017-05-08 ENCOUNTER — OUTPATIENT (OUTPATIENT)
Dept: OUTPATIENT SERVICES | Facility: HOSPITAL | Age: 72
LOS: 1 days | End: 2017-05-08
Payer: MEDICARE

## 2017-05-08 DIAGNOSIS — H26.8 OTHER SPECIFIED CATARACT: Chronic | ICD-10-CM

## 2017-05-08 DIAGNOSIS — G70.00 MYASTHENIA GRAVIS WITHOUT (ACUTE) EXACERBATION: ICD-10-CM

## 2017-05-08 LAB
HCT VFR BLD CALC: 41.1 % — SIGNIFICANT CHANGE UP (ref 39–50)
HGB BLD-MCNC: 14.2 G/DL — SIGNIFICANT CHANGE UP (ref 13–17)
MCHC RBC-ENTMCNC: 33.7 PG — SIGNIFICANT CHANGE UP (ref 27–34)
MCHC RBC-ENTMCNC: 34.5 GM/DL — SIGNIFICANT CHANGE UP (ref 32–36)
MCV RBC AUTO: 97.6 FL — SIGNIFICANT CHANGE UP (ref 80–100)
PLATELET # BLD AUTO: 196 K/UL — SIGNIFICANT CHANGE UP (ref 150–400)
RBC # BLD: 4.21 M/UL — SIGNIFICANT CHANGE UP (ref 4.2–5.8)
RBC # FLD: 12.8 % — SIGNIFICANT CHANGE UP (ref 10.3–14.5)
WBC # BLD: 13.1 K/UL — HIGH (ref 3.8–10.5)
WBC # FLD AUTO: 13.1 K/UL — HIGH (ref 3.8–10.5)

## 2017-05-08 PROCEDURE — 36514 APHERESIS PLASMA: CPT

## 2017-05-08 PROCEDURE — P9041: CPT

## 2017-05-08 PROCEDURE — 99213 OFFICE O/P EST LOW 20 MIN: CPT | Mod: 25

## 2017-05-08 PROCEDURE — 85027 COMPLETE CBC AUTOMATED: CPT

## 2017-05-10 ENCOUNTER — OUTPATIENT (OUTPATIENT)
Dept: OUTPATIENT SERVICES | Facility: HOSPITAL | Age: 72
LOS: 1 days | End: 2017-05-10
Payer: MEDICARE

## 2017-05-10 DIAGNOSIS — G70.00 MYASTHENIA GRAVIS WITHOUT (ACUTE) EXACERBATION: ICD-10-CM

## 2017-05-10 DIAGNOSIS — H26.8 OTHER SPECIFIED CATARACT: Chronic | ICD-10-CM

## 2017-05-10 LAB
HCT VFR BLD CALC: 40.4 % — SIGNIFICANT CHANGE UP (ref 39–50)
HGB BLD-MCNC: 13.8 G/DL — SIGNIFICANT CHANGE UP (ref 13–17)
MCHC RBC-ENTMCNC: 32.9 PG — SIGNIFICANT CHANGE UP (ref 27–34)
MCHC RBC-ENTMCNC: 34.2 GM/DL — SIGNIFICANT CHANGE UP (ref 32–36)
MCV RBC AUTO: 96.3 FL — SIGNIFICANT CHANGE UP (ref 80–100)
PLATELET # BLD AUTO: 208 K/UL — SIGNIFICANT CHANGE UP (ref 150–400)
RBC # BLD: 4.19 M/UL — LOW (ref 4.2–5.8)
RBC # FLD: 12.9 % — SIGNIFICANT CHANGE UP (ref 10.3–14.5)
WBC # BLD: 14.4 K/UL — HIGH (ref 3.8–10.5)
WBC # FLD AUTO: 14.4 K/UL — HIGH (ref 3.8–10.5)

## 2017-05-10 PROCEDURE — P9041: CPT

## 2017-05-10 PROCEDURE — 99213 OFFICE O/P EST LOW 20 MIN: CPT | Mod: 25

## 2017-05-10 PROCEDURE — 85027 COMPLETE CBC AUTOMATED: CPT

## 2017-05-10 PROCEDURE — 36514 APHERESIS PLASMA: CPT

## 2017-05-27 ENCOUNTER — OUTPATIENT (OUTPATIENT)
Dept: OUTPATIENT SERVICES | Facility: HOSPITAL | Age: 72
LOS: 1 days | End: 2017-05-27
Payer: MEDICARE

## 2017-05-27 DIAGNOSIS — G70.00 MYASTHENIA GRAVIS WITHOUT (ACUTE) EXACERBATION: ICD-10-CM

## 2017-05-27 DIAGNOSIS — H26.8 OTHER SPECIFIED CATARACT: Chronic | ICD-10-CM

## 2017-05-27 LAB
HCT VFR BLD CALC: 40.6 % — SIGNIFICANT CHANGE UP (ref 39–50)
HGB BLD-MCNC: 13.1 G/DL — SIGNIFICANT CHANGE UP (ref 13–17)
MCHC RBC-ENTMCNC: 31.8 PG — SIGNIFICANT CHANGE UP (ref 27–34)
MCHC RBC-ENTMCNC: 32.2 GM/DL — SIGNIFICANT CHANGE UP (ref 32–36)
MCV RBC AUTO: 98.7 FL — SIGNIFICANT CHANGE UP (ref 80–100)
PLATELET # BLD AUTO: 236 K/UL — SIGNIFICANT CHANGE UP (ref 150–400)
RBC # BLD: 4.12 M/UL — LOW (ref 4.2–5.8)
RBC # FLD: 12.9 % — SIGNIFICANT CHANGE UP (ref 10.3–14.5)
WBC # BLD: 12 K/UL — HIGH (ref 3.8–10.5)
WBC # FLD AUTO: 12 K/UL — HIGH (ref 3.8–10.5)

## 2017-05-27 PROCEDURE — 36514 APHERESIS PLASMA: CPT

## 2017-05-27 PROCEDURE — 85027 COMPLETE CBC AUTOMATED: CPT

## 2017-05-27 PROCEDURE — 99213 OFFICE O/P EST LOW 20 MIN: CPT | Mod: 25

## 2017-05-27 PROCEDURE — P9041: CPT

## 2017-05-30 ENCOUNTER — OUTPATIENT (OUTPATIENT)
Dept: OUTPATIENT SERVICES | Facility: HOSPITAL | Age: 72
LOS: 1 days | End: 2017-05-30
Payer: MEDICARE

## 2017-05-30 DIAGNOSIS — H26.8 OTHER SPECIFIED CATARACT: Chronic | ICD-10-CM

## 2017-05-30 DIAGNOSIS — G70.00 MYASTHENIA GRAVIS WITHOUT (ACUTE) EXACERBATION: ICD-10-CM

## 2017-05-30 LAB
HCT VFR BLD CALC: 41.8 % — SIGNIFICANT CHANGE UP (ref 39–50)
HGB BLD-MCNC: 13.8 G/DL — SIGNIFICANT CHANGE UP (ref 13–17)
MCHC RBC-ENTMCNC: 32.6 PG — SIGNIFICANT CHANGE UP (ref 27–34)
MCHC RBC-ENTMCNC: 33 GM/DL — SIGNIFICANT CHANGE UP (ref 32–36)
MCV RBC AUTO: 98.8 FL — SIGNIFICANT CHANGE UP (ref 80–100)
PLATELET # BLD AUTO: 226 K/UL — SIGNIFICANT CHANGE UP (ref 150–400)
RBC # BLD: 4.23 M/UL — SIGNIFICANT CHANGE UP (ref 4.2–5.8)
RBC # FLD: 13 % — SIGNIFICANT CHANGE UP (ref 10.3–14.5)
WBC # BLD: 14.2 K/UL — HIGH (ref 3.8–10.5)
WBC # FLD AUTO: 14.2 K/UL — HIGH (ref 3.8–10.5)

## 2017-05-30 PROCEDURE — 36514 APHERESIS PLASMA: CPT

## 2017-05-30 PROCEDURE — 99214 OFFICE O/P EST MOD 30 MIN: CPT | Mod: 25

## 2017-05-30 PROCEDURE — 85027 COMPLETE CBC AUTOMATED: CPT

## 2017-05-30 PROCEDURE — P9041: CPT

## 2017-05-30 PROCEDURE — P9045: CPT

## 2017-06-01 ENCOUNTER — OUTPATIENT (OUTPATIENT)
Dept: OUTPATIENT SERVICES | Facility: HOSPITAL | Age: 72
LOS: 1 days | End: 2017-06-01
Payer: MEDICARE

## 2017-06-01 DIAGNOSIS — G70.00 MYASTHENIA GRAVIS WITHOUT (ACUTE) EXACERBATION: ICD-10-CM

## 2017-06-01 DIAGNOSIS — H26.8 OTHER SPECIFIED CATARACT: Chronic | ICD-10-CM

## 2017-06-01 LAB
HCT VFR BLD CALC: 41 % — SIGNIFICANT CHANGE UP (ref 39–50)
HGB BLD-MCNC: 13.6 G/DL — SIGNIFICANT CHANGE UP (ref 13–17)
MCHC RBC-ENTMCNC: 33 PG — SIGNIFICANT CHANGE UP (ref 27–34)
MCHC RBC-ENTMCNC: 33.2 GM/DL — SIGNIFICANT CHANGE UP (ref 32–36)
MCV RBC AUTO: 99.5 FL — SIGNIFICANT CHANGE UP (ref 80–100)
PLATELET # BLD AUTO: 222 K/UL — SIGNIFICANT CHANGE UP (ref 150–400)
RBC # BLD: 4.12 M/UL — LOW (ref 4.2–5.8)
RBC # FLD: 12.9 % — SIGNIFICANT CHANGE UP (ref 10.3–14.5)
WBC # BLD: 14.9 K/UL — HIGH (ref 3.8–10.5)
WBC # FLD AUTO: 14.9 K/UL — HIGH (ref 3.8–10.5)

## 2017-06-01 PROCEDURE — P9045: CPT

## 2017-06-01 PROCEDURE — 85027 COMPLETE CBC AUTOMATED: CPT

## 2017-06-01 PROCEDURE — 36514 APHERESIS PLASMA: CPT

## 2017-06-01 PROCEDURE — 99214 OFFICE O/P EST MOD 30 MIN: CPT | Mod: 25

## 2017-06-01 PROCEDURE — P9041: CPT

## 2017-06-19 ENCOUNTER — OUTPATIENT (OUTPATIENT)
Dept: OUTPATIENT SERVICES | Facility: HOSPITAL | Age: 72
LOS: 1 days | End: 2017-06-19
Payer: MEDICARE

## 2017-06-19 DIAGNOSIS — G70.00 MYASTHENIA GRAVIS WITHOUT (ACUTE) EXACERBATION: ICD-10-CM

## 2017-06-19 DIAGNOSIS — H26.8 OTHER SPECIFIED CATARACT: Chronic | ICD-10-CM

## 2017-06-19 LAB
HCT VFR BLD CALC: 39.7 % — SIGNIFICANT CHANGE UP (ref 39–50)
HGB BLD-MCNC: 13.3 G/DL — SIGNIFICANT CHANGE UP (ref 13–17)
MCHC RBC-ENTMCNC: 33.2 PG — SIGNIFICANT CHANGE UP (ref 27–34)
MCHC RBC-ENTMCNC: 33.6 GM/DL — SIGNIFICANT CHANGE UP (ref 32–36)
MCV RBC AUTO: 99 FL — SIGNIFICANT CHANGE UP (ref 80–100)
PLATELET # BLD AUTO: 229 K/UL — SIGNIFICANT CHANGE UP (ref 150–400)
RBC # BLD: 4.01 M/UL — LOW (ref 4.2–5.8)
RBC # FLD: 13.2 % — SIGNIFICANT CHANGE UP (ref 10.3–14.5)
WBC # BLD: 11.3 K/UL — HIGH (ref 3.8–10.5)
WBC # FLD AUTO: 11.3 K/UL — HIGH (ref 3.8–10.5)

## 2017-06-19 PROCEDURE — 99214 OFFICE O/P EST MOD 30 MIN: CPT | Mod: 25

## 2017-06-19 PROCEDURE — 85027 COMPLETE CBC AUTOMATED: CPT

## 2017-06-19 PROCEDURE — 36514 APHERESIS PLASMA: CPT

## 2017-06-21 ENCOUNTER — OUTPATIENT (OUTPATIENT)
Dept: OUTPATIENT SERVICES | Facility: HOSPITAL | Age: 72
LOS: 1 days | End: 2017-06-21
Payer: MEDICARE

## 2017-06-21 DIAGNOSIS — G70.00 MYASTHENIA GRAVIS WITHOUT (ACUTE) EXACERBATION: ICD-10-CM

## 2017-06-21 DIAGNOSIS — H26.8 OTHER SPECIFIED CATARACT: Chronic | ICD-10-CM

## 2017-06-21 LAB
HCT VFR BLD CALC: 43.2 % — SIGNIFICANT CHANGE UP (ref 39–50)
HGB BLD-MCNC: 13.9 G/DL — SIGNIFICANT CHANGE UP (ref 13–17)
MCHC RBC-ENTMCNC: 31.8 PG — SIGNIFICANT CHANGE UP (ref 27–34)
MCHC RBC-ENTMCNC: 32.2 GM/DL — SIGNIFICANT CHANGE UP (ref 32–36)
MCV RBC AUTO: 98.7 FL — SIGNIFICANT CHANGE UP (ref 80–100)
PLATELET # BLD AUTO: 231 K/UL — SIGNIFICANT CHANGE UP (ref 150–400)
RBC # BLD: 4.38 M/UL — SIGNIFICANT CHANGE UP (ref 4.2–5.8)
RBC # FLD: 13.4 % — SIGNIFICANT CHANGE UP (ref 10.3–14.5)
WBC # BLD: 15.2 K/UL — HIGH (ref 3.8–10.5)
WBC # FLD AUTO: 15.2 K/UL — HIGH (ref 3.8–10.5)

## 2017-06-21 PROCEDURE — 36514 APHERESIS PLASMA: CPT

## 2017-06-21 PROCEDURE — P9045: CPT

## 2017-06-21 PROCEDURE — P9041: CPT

## 2017-06-21 PROCEDURE — 99214 OFFICE O/P EST MOD 30 MIN: CPT | Mod: 25

## 2017-06-21 PROCEDURE — 85027 COMPLETE CBC AUTOMATED: CPT

## 2017-06-23 ENCOUNTER — OUTPATIENT (OUTPATIENT)
Dept: OUTPATIENT SERVICES | Facility: HOSPITAL | Age: 72
LOS: 1 days | End: 2017-06-23
Payer: MEDICARE

## 2017-06-23 DIAGNOSIS — H26.8 OTHER SPECIFIED CATARACT: Chronic | ICD-10-CM

## 2017-06-23 DIAGNOSIS — G70.00 MYASTHENIA GRAVIS WITHOUT (ACUTE) EXACERBATION: ICD-10-CM

## 2017-06-23 LAB
HCT VFR BLD CALC: 40.6 % — SIGNIFICANT CHANGE UP (ref 39–50)
HGB BLD-MCNC: 14 G/DL — SIGNIFICANT CHANGE UP (ref 13–17)
MCHC RBC-ENTMCNC: 34.2 PG — HIGH (ref 27–34)
MCHC RBC-ENTMCNC: 34.4 GM/DL — SIGNIFICANT CHANGE UP (ref 32–36)
MCV RBC AUTO: 99.3 FL — SIGNIFICANT CHANGE UP (ref 80–100)
PLATELET # BLD AUTO: 211 K/UL — SIGNIFICANT CHANGE UP (ref 150–400)
RBC # BLD: 4.09 M/UL — LOW (ref 4.2–5.8)
RBC # FLD: 13.1 % — SIGNIFICANT CHANGE UP (ref 10.3–14.5)
WBC # BLD: 13.7 K/UL — HIGH (ref 3.8–10.5)
WBC # FLD AUTO: 13.7 K/UL — HIGH (ref 3.8–10.5)

## 2017-06-23 PROCEDURE — 36514 APHERESIS PLASMA: CPT

## 2017-06-23 PROCEDURE — P9041: CPT

## 2017-06-23 PROCEDURE — 85027 COMPLETE CBC AUTOMATED: CPT

## 2017-06-23 PROCEDURE — 99214 OFFICE O/P EST MOD 30 MIN: CPT | Mod: 25

## 2017-07-13 ENCOUNTER — OUTPATIENT (OUTPATIENT)
Dept: OUTPATIENT SERVICES | Facility: HOSPITAL | Age: 72
LOS: 1 days | End: 2017-07-13
Payer: MEDICARE

## 2017-07-13 DIAGNOSIS — H26.8 OTHER SPECIFIED CATARACT: Chronic | ICD-10-CM

## 2017-07-13 DIAGNOSIS — G70.00 MYASTHENIA GRAVIS WITHOUT (ACUTE) EXACERBATION: ICD-10-CM

## 2017-07-13 LAB
HCT VFR BLD CALC: 42.6 % — SIGNIFICANT CHANGE UP (ref 39–50)
HGB BLD-MCNC: 13.8 G/DL — SIGNIFICANT CHANGE UP (ref 13–17)
MCHC RBC-ENTMCNC: 32.2 PG — SIGNIFICANT CHANGE UP (ref 27–34)
MCHC RBC-ENTMCNC: 32.5 GM/DL — SIGNIFICANT CHANGE UP (ref 32–36)
MCV RBC AUTO: 98.9 FL — SIGNIFICANT CHANGE UP (ref 80–100)
PLATELET # BLD AUTO: 209 K/UL — SIGNIFICANT CHANGE UP (ref 150–400)
RBC # BLD: 4.3 M/UL — SIGNIFICANT CHANGE UP (ref 4.2–5.8)
RBC # FLD: 13.3 % — SIGNIFICANT CHANGE UP (ref 10.3–14.5)
WBC # BLD: 13.9 K/UL — HIGH (ref 3.8–10.5)
WBC # FLD AUTO: 13.9 K/UL — HIGH (ref 3.8–10.5)

## 2017-07-13 PROCEDURE — 36514 APHERESIS PLASMA: CPT

## 2017-07-13 PROCEDURE — 99214 OFFICE O/P EST MOD 30 MIN: CPT | Mod: 25

## 2017-07-13 PROCEDURE — 85027 COMPLETE CBC AUTOMATED: CPT

## 2017-07-13 PROCEDURE — P9041: CPT

## 2017-07-15 ENCOUNTER — OUTPATIENT (OUTPATIENT)
Dept: OUTPATIENT SERVICES | Facility: HOSPITAL | Age: 72
LOS: 1 days | End: 2017-07-15
Payer: MEDICARE

## 2017-07-15 DIAGNOSIS — H26.8 OTHER SPECIFIED CATARACT: Chronic | ICD-10-CM

## 2017-07-15 DIAGNOSIS — G70.00 MYASTHENIA GRAVIS WITHOUT (ACUTE) EXACERBATION: ICD-10-CM

## 2017-07-15 LAB
HCT VFR BLD CALC: 43 % — SIGNIFICANT CHANGE UP (ref 39–50)
HGB BLD-MCNC: 13.7 G/DL — SIGNIFICANT CHANGE UP (ref 13–17)
MCHC RBC-ENTMCNC: 31.5 PG — SIGNIFICANT CHANGE UP (ref 27–34)
MCHC RBC-ENTMCNC: 31.9 GM/DL — LOW (ref 32–36)
MCV RBC AUTO: 98.8 FL — SIGNIFICANT CHANGE UP (ref 80–100)
PLATELET # BLD AUTO: 205 K/UL — SIGNIFICANT CHANGE UP (ref 150–400)
RBC # BLD: 4.35 M/UL — SIGNIFICANT CHANGE UP (ref 4.2–5.8)
RBC # FLD: 13.4 % — SIGNIFICANT CHANGE UP (ref 10.3–14.5)
WBC # BLD: 14.2 K/UL — HIGH (ref 3.8–10.5)
WBC # FLD AUTO: 14.2 K/UL — HIGH (ref 3.8–10.5)

## 2017-07-15 PROCEDURE — P9045: CPT

## 2017-07-15 PROCEDURE — 36514 APHERESIS PLASMA: CPT

## 2017-07-15 PROCEDURE — 85027 COMPLETE CBC AUTOMATED: CPT

## 2017-07-15 PROCEDURE — 99214 OFFICE O/P EST MOD 30 MIN: CPT | Mod: 25

## 2017-07-15 PROCEDURE — P9041: CPT

## 2017-07-17 ENCOUNTER — OUTPATIENT (OUTPATIENT)
Dept: OUTPATIENT SERVICES | Facility: HOSPITAL | Age: 72
LOS: 1 days | End: 2017-07-17
Payer: MEDICARE

## 2017-07-17 DIAGNOSIS — H26.8 OTHER SPECIFIED CATARACT: Chronic | ICD-10-CM

## 2017-07-17 DIAGNOSIS — G70.00 MYASTHENIA GRAVIS WITHOUT (ACUTE) EXACERBATION: ICD-10-CM

## 2017-07-17 LAB
HCT VFR BLD CALC: 39 % — SIGNIFICANT CHANGE UP (ref 39–50)
HGB BLD-MCNC: 13.4 G/DL — SIGNIFICANT CHANGE UP (ref 13–17)
MCHC RBC-ENTMCNC: 34.3 PG — HIGH (ref 27–34)
MCHC RBC-ENTMCNC: 34.4 GM/DL — SIGNIFICANT CHANGE UP (ref 32–36)
MCV RBC AUTO: 99.6 FL — SIGNIFICANT CHANGE UP (ref 80–100)
PLATELET # BLD AUTO: 192 K/UL — SIGNIFICANT CHANGE UP (ref 150–400)
RBC # BLD: 3.92 M/UL — LOW (ref 4.2–5.8)
RBC # FLD: 13.3 % — SIGNIFICANT CHANGE UP (ref 10.3–14.5)
WBC # BLD: 13 K/UL — HIGH (ref 3.8–10.5)
WBC # FLD AUTO: 13 K/UL — HIGH (ref 3.8–10.5)

## 2017-07-17 PROCEDURE — 99214 OFFICE O/P EST MOD 30 MIN: CPT | Mod: 25

## 2017-07-17 PROCEDURE — 36514 APHERESIS PLASMA: CPT

## 2017-07-17 PROCEDURE — 85027 COMPLETE CBC AUTOMATED: CPT

## 2017-07-17 PROCEDURE — P9041: CPT

## 2017-08-04 ENCOUNTER — OUTPATIENT (OUTPATIENT)
Dept: OUTPATIENT SERVICES | Facility: HOSPITAL | Age: 72
LOS: 1 days | End: 2017-08-04
Payer: MEDICARE

## 2017-08-04 DIAGNOSIS — H26.8 OTHER SPECIFIED CATARACT: Chronic | ICD-10-CM

## 2017-08-04 DIAGNOSIS — G70.00 MYASTHENIA GRAVIS WITHOUT (ACUTE) EXACERBATION: ICD-10-CM

## 2017-08-04 LAB
HCT VFR BLD CALC: 41.1 % — SIGNIFICANT CHANGE UP (ref 39–50)
HGB BLD-MCNC: 13.7 G/DL — SIGNIFICANT CHANGE UP (ref 13–17)
MCHC RBC-ENTMCNC: 33.2 PG — SIGNIFICANT CHANGE UP (ref 27–34)
MCHC RBC-ENTMCNC: 33.3 GM/DL — SIGNIFICANT CHANGE UP (ref 32–36)
MCV RBC AUTO: 99.8 FL — SIGNIFICANT CHANGE UP (ref 80–100)
PLATELET # BLD AUTO: 224 K/UL — SIGNIFICANT CHANGE UP (ref 150–400)
RBC # BLD: 4.12 M/UL — LOW (ref 4.2–5.8)
RBC # FLD: 13.2 % — SIGNIFICANT CHANGE UP (ref 10.3–14.5)
WBC # BLD: 15.1 K/UL — HIGH (ref 3.8–10.5)
WBC # FLD AUTO: 15.1 K/UL — HIGH (ref 3.8–10.5)

## 2017-08-04 PROCEDURE — 99214 OFFICE O/P EST MOD 30 MIN: CPT | Mod: 25

## 2017-08-04 PROCEDURE — 85027 COMPLETE CBC AUTOMATED: CPT

## 2017-08-04 PROCEDURE — 36514 APHERESIS PLASMA: CPT

## 2017-08-04 PROCEDURE — P9041: CPT

## 2017-08-07 ENCOUNTER — OUTPATIENT (OUTPATIENT)
Dept: OUTPATIENT SERVICES | Facility: HOSPITAL | Age: 72
LOS: 1 days | End: 2017-08-07
Payer: MEDICARE

## 2017-08-07 DIAGNOSIS — H26.8 OTHER SPECIFIED CATARACT: Chronic | ICD-10-CM

## 2017-08-07 DIAGNOSIS — G70.00 MYASTHENIA GRAVIS WITHOUT (ACUTE) EXACERBATION: ICD-10-CM

## 2017-08-07 LAB
HCT VFR BLD CALC: 42 % — SIGNIFICANT CHANGE UP (ref 39–50)
HGB BLD-MCNC: 13.7 G/DL — SIGNIFICANT CHANGE UP (ref 13–17)
MCHC RBC-ENTMCNC: 32.5 PG — SIGNIFICANT CHANGE UP (ref 27–34)
MCHC RBC-ENTMCNC: 32.7 GM/DL — SIGNIFICANT CHANGE UP (ref 32–36)
MCV RBC AUTO: 99.4 FL — SIGNIFICANT CHANGE UP (ref 80–100)
PLATELET # BLD AUTO: 215 K/UL — SIGNIFICANT CHANGE UP (ref 150–400)
RBC # BLD: 4.23 M/UL — SIGNIFICANT CHANGE UP (ref 4.2–5.8)
RBC # FLD: 13.3 % — SIGNIFICANT CHANGE UP (ref 10.3–14.5)
WBC # BLD: 14.7 K/UL — HIGH (ref 3.8–10.5)
WBC # FLD AUTO: 14.7 K/UL — HIGH (ref 3.8–10.5)

## 2017-08-07 PROCEDURE — 36514 APHERESIS PLASMA: CPT

## 2017-08-07 PROCEDURE — P9041: CPT

## 2017-08-07 PROCEDURE — 85027 COMPLETE CBC AUTOMATED: CPT

## 2017-08-09 ENCOUNTER — OUTPATIENT (OUTPATIENT)
Dept: OUTPATIENT SERVICES | Facility: HOSPITAL | Age: 72
LOS: 1 days | End: 2017-08-09
Payer: MEDICARE

## 2017-08-09 DIAGNOSIS — G70.00 MYASTHENIA GRAVIS WITHOUT (ACUTE) EXACERBATION: ICD-10-CM

## 2017-08-09 DIAGNOSIS — H26.8 OTHER SPECIFIED CATARACT: Chronic | ICD-10-CM

## 2017-08-09 LAB
HCT VFR BLD CALC: 41.3 % — SIGNIFICANT CHANGE UP (ref 39–50)
HGB BLD-MCNC: 13.5 G/DL — SIGNIFICANT CHANGE UP (ref 13–17)
MCHC RBC-ENTMCNC: 32.6 PG — SIGNIFICANT CHANGE UP (ref 27–34)
MCHC RBC-ENTMCNC: 32.7 GM/DL — SIGNIFICANT CHANGE UP (ref 32–36)
MCV RBC AUTO: 99.7 FL — SIGNIFICANT CHANGE UP (ref 80–100)
PLATELET # BLD AUTO: 210 K/UL — SIGNIFICANT CHANGE UP (ref 150–400)
RBC # BLD: 4.14 M/UL — LOW (ref 4.2–5.8)
RBC # FLD: 13.3 % — SIGNIFICANT CHANGE UP (ref 10.3–14.5)
WBC # BLD: 14 K/UL — HIGH (ref 3.8–10.5)
WBC # FLD AUTO: 14 K/UL — HIGH (ref 3.8–10.5)

## 2017-08-09 PROCEDURE — P9041: CPT

## 2017-08-09 PROCEDURE — 36514 APHERESIS PLASMA: CPT

## 2017-08-09 PROCEDURE — 99214 OFFICE O/P EST MOD 30 MIN: CPT | Mod: 25

## 2017-08-09 PROCEDURE — 85027 COMPLETE CBC AUTOMATED: CPT

## 2017-08-28 ENCOUNTER — OUTPATIENT (OUTPATIENT)
Dept: OUTPATIENT SERVICES | Facility: HOSPITAL | Age: 72
LOS: 1 days | End: 2017-08-28
Payer: MEDICARE

## 2017-08-28 DIAGNOSIS — H26.8 OTHER SPECIFIED CATARACT: Chronic | ICD-10-CM

## 2017-08-28 DIAGNOSIS — G70.00 MYASTHENIA GRAVIS WITHOUT (ACUTE) EXACERBATION: ICD-10-CM

## 2017-08-28 LAB
HCT VFR BLD CALC: 40.6 % — SIGNIFICANT CHANGE UP (ref 39–50)
HGB BLD-MCNC: 13.3 G/DL — SIGNIFICANT CHANGE UP (ref 13–17)
MCHC RBC-ENTMCNC: 32.2 PG — SIGNIFICANT CHANGE UP (ref 27–34)
MCHC RBC-ENTMCNC: 32.8 GM/DL — SIGNIFICANT CHANGE UP (ref 32–36)
MCV RBC AUTO: 98.4 FL — SIGNIFICANT CHANGE UP (ref 80–100)
PLATELET # BLD AUTO: 241 K/UL — SIGNIFICANT CHANGE UP (ref 150–400)
RBC # BLD: 4.13 M/UL — LOW (ref 4.2–5.8)
RBC # FLD: 13 % — SIGNIFICANT CHANGE UP (ref 10.3–14.5)
WBC # BLD: 13.7 K/UL — HIGH (ref 3.8–10.5)
WBC # FLD AUTO: 13.7 K/UL — HIGH (ref 3.8–10.5)

## 2017-08-28 PROCEDURE — 99214 OFFICE O/P EST MOD 30 MIN: CPT | Mod: 25

## 2017-08-28 PROCEDURE — 85027 COMPLETE CBC AUTOMATED: CPT

## 2017-08-28 PROCEDURE — 36514 APHERESIS PLASMA: CPT

## 2017-08-28 PROCEDURE — P9041: CPT

## 2017-08-28 PROCEDURE — P9045: CPT

## 2017-08-30 ENCOUNTER — OUTPATIENT (OUTPATIENT)
Dept: OUTPATIENT SERVICES | Facility: HOSPITAL | Age: 72
LOS: 1 days | End: 2017-08-30
Payer: MEDICARE

## 2017-08-30 DIAGNOSIS — G70.00 MYASTHENIA GRAVIS WITHOUT (ACUTE) EXACERBATION: ICD-10-CM

## 2017-08-30 DIAGNOSIS — H26.8 OTHER SPECIFIED CATARACT: Chronic | ICD-10-CM

## 2017-08-30 LAB
HCT VFR BLD CALC: 41.8 % — SIGNIFICANT CHANGE UP (ref 39–50)
HGB BLD-MCNC: 13.4 G/DL — SIGNIFICANT CHANGE UP (ref 13–17)
MCHC RBC-ENTMCNC: 31.6 PG — SIGNIFICANT CHANGE UP (ref 27–34)
MCHC RBC-ENTMCNC: 32.1 GM/DL — SIGNIFICANT CHANGE UP (ref 32–36)
MCV RBC AUTO: 98.6 FL — SIGNIFICANT CHANGE UP (ref 80–100)
PLATELET # BLD AUTO: 254 K/UL — SIGNIFICANT CHANGE UP (ref 150–400)
RBC # BLD: 4.24 M/UL — SIGNIFICANT CHANGE UP (ref 4.2–5.8)
RBC # FLD: 12.8 % — SIGNIFICANT CHANGE UP (ref 10.3–14.5)
WBC # BLD: 15.6 K/UL — HIGH (ref 3.8–10.5)
WBC # FLD AUTO: 15.6 K/UL — HIGH (ref 3.8–10.5)

## 2017-08-30 PROCEDURE — 36514 APHERESIS PLASMA: CPT

## 2017-08-30 PROCEDURE — P9041: CPT

## 2017-08-30 PROCEDURE — 85027 COMPLETE CBC AUTOMATED: CPT

## 2017-08-30 PROCEDURE — P9045: CPT

## 2017-08-30 PROCEDURE — 99214 OFFICE O/P EST MOD 30 MIN: CPT | Mod: 25

## 2017-09-01 ENCOUNTER — OUTPATIENT (OUTPATIENT)
Dept: OUTPATIENT SERVICES | Facility: HOSPITAL | Age: 72
LOS: 1 days | End: 2017-09-01
Payer: MEDICARE

## 2017-09-01 DIAGNOSIS — H26.8 OTHER SPECIFIED CATARACT: Chronic | ICD-10-CM

## 2017-09-01 DIAGNOSIS — G70.00 MYASTHENIA GRAVIS WITHOUT (ACUTE) EXACERBATION: ICD-10-CM

## 2017-09-01 LAB
HCT VFR BLD CALC: 40.9 % — SIGNIFICANT CHANGE UP (ref 39–50)
HGB BLD-MCNC: 13.2 G/DL — SIGNIFICANT CHANGE UP (ref 13–17)
MCHC RBC-ENTMCNC: 31.7 PG — SIGNIFICANT CHANGE UP (ref 27–34)
MCHC RBC-ENTMCNC: 32.3 GM/DL — SIGNIFICANT CHANGE UP (ref 32–36)
MCV RBC AUTO: 98.1 FL — SIGNIFICANT CHANGE UP (ref 80–100)
PLATELET # BLD AUTO: 236 K/UL — SIGNIFICANT CHANGE UP (ref 150–400)
RBC # BLD: 4.17 M/UL — LOW (ref 4.2–5.8)
RBC # FLD: 12.9 % — SIGNIFICANT CHANGE UP (ref 10.3–14.5)
WBC # BLD: 18.4 K/UL — HIGH (ref 3.8–10.5)
WBC # FLD AUTO: 18.4 K/UL — HIGH (ref 3.8–10.5)

## 2017-09-01 PROCEDURE — 99214 OFFICE O/P EST MOD 30 MIN: CPT | Mod: 25

## 2017-09-01 PROCEDURE — P9041: CPT

## 2017-09-01 PROCEDURE — 36514 APHERESIS PLASMA: CPT

## 2017-09-01 PROCEDURE — 85027 COMPLETE CBC AUTOMATED: CPT

## 2017-09-01 PROCEDURE — P9045: CPT

## 2017-09-22 ENCOUNTER — OUTPATIENT (OUTPATIENT)
Dept: OUTPATIENT SERVICES | Facility: HOSPITAL | Age: 72
LOS: 1 days | End: 2017-09-22
Payer: MEDICARE

## 2017-09-22 DIAGNOSIS — H26.8 OTHER SPECIFIED CATARACT: Chronic | ICD-10-CM

## 2017-09-22 DIAGNOSIS — G70.00 MYASTHENIA GRAVIS WITHOUT (ACUTE) EXACERBATION: ICD-10-CM

## 2017-09-22 LAB
HCT VFR BLD CALC: 40.9 % — SIGNIFICANT CHANGE UP (ref 39–50)
HGB BLD-MCNC: 13.3 G/DL — SIGNIFICANT CHANGE UP (ref 13–17)
MCHC RBC-ENTMCNC: 31.8 PG — SIGNIFICANT CHANGE UP (ref 27–34)
MCHC RBC-ENTMCNC: 32.4 GM/DL — SIGNIFICANT CHANGE UP (ref 32–36)
MCV RBC AUTO: 98.1 FL — SIGNIFICANT CHANGE UP (ref 80–100)
PLATELET # BLD AUTO: 253 K/UL — SIGNIFICANT CHANGE UP (ref 150–400)
RBC # BLD: 4.17 M/UL — LOW (ref 4.2–5.8)
RBC # FLD: 13.1 % — SIGNIFICANT CHANGE UP (ref 10.3–14.5)
WBC # BLD: 14.7 K/UL — HIGH (ref 3.8–10.5)
WBC # FLD AUTO: 14.7 K/UL — HIGH (ref 3.8–10.5)

## 2017-09-22 PROCEDURE — 36514 APHERESIS PLASMA: CPT

## 2017-09-22 PROCEDURE — 99214 OFFICE O/P EST MOD 30 MIN: CPT | Mod: 25

## 2017-09-22 PROCEDURE — 85027 COMPLETE CBC AUTOMATED: CPT

## 2017-09-22 PROCEDURE — P9041: CPT

## 2017-09-25 ENCOUNTER — OUTPATIENT (OUTPATIENT)
Dept: OUTPATIENT SERVICES | Facility: HOSPITAL | Age: 72
LOS: 1 days | End: 2017-09-25
Payer: MEDICARE

## 2017-09-25 DIAGNOSIS — G70.00 MYASTHENIA GRAVIS WITHOUT (ACUTE) EXACERBATION: ICD-10-CM

## 2017-09-25 DIAGNOSIS — H26.8 OTHER SPECIFIED CATARACT: Chronic | ICD-10-CM

## 2017-09-25 LAB
HCT VFR BLD CALC: 41 % — SIGNIFICANT CHANGE UP (ref 39–50)
HGB BLD-MCNC: 13.7 G/DL — SIGNIFICANT CHANGE UP (ref 13–17)
MCHC RBC-ENTMCNC: 32.7 PG — SIGNIFICANT CHANGE UP (ref 27–34)
MCHC RBC-ENTMCNC: 33.4 GM/DL — SIGNIFICANT CHANGE UP (ref 32–36)
MCV RBC AUTO: 97.9 FL — SIGNIFICANT CHANGE UP (ref 80–100)
PLATELET # BLD AUTO: 246 K/UL — SIGNIFICANT CHANGE UP (ref 150–400)
RBC # BLD: 4.19 M/UL — LOW (ref 4.2–5.8)
RBC # FLD: 12.9 % — SIGNIFICANT CHANGE UP (ref 10.3–14.5)
WBC # BLD: 16.7 K/UL — HIGH (ref 3.8–10.5)
WBC # FLD AUTO: 16.7 K/UL — HIGH (ref 3.8–10.5)

## 2017-09-25 PROCEDURE — 36514 APHERESIS PLASMA: CPT

## 2017-09-25 PROCEDURE — 85027 COMPLETE CBC AUTOMATED: CPT

## 2017-09-25 PROCEDURE — 99214 OFFICE O/P EST MOD 30 MIN: CPT | Mod: 25

## 2017-09-25 PROCEDURE — P9041: CPT

## 2017-09-27 ENCOUNTER — OUTPATIENT (OUTPATIENT)
Dept: OUTPATIENT SERVICES | Facility: HOSPITAL | Age: 72
LOS: 1 days | End: 2017-09-27
Payer: MEDICARE

## 2017-09-27 DIAGNOSIS — G70.00 MYASTHENIA GRAVIS WITHOUT (ACUTE) EXACERBATION: ICD-10-CM

## 2017-09-27 DIAGNOSIS — H26.8 OTHER SPECIFIED CATARACT: Chronic | ICD-10-CM

## 2017-09-27 LAB
HCT VFR BLD CALC: 40.2 % — SIGNIFICANT CHANGE UP (ref 39–50)
HGB BLD-MCNC: 13.3 G/DL — SIGNIFICANT CHANGE UP (ref 13–17)
MCHC RBC-ENTMCNC: 32.4 PG — SIGNIFICANT CHANGE UP (ref 27–34)
MCHC RBC-ENTMCNC: 33.1 GM/DL — SIGNIFICANT CHANGE UP (ref 32–36)
MCV RBC AUTO: 97.9 FL — SIGNIFICANT CHANGE UP (ref 80–100)
PLATELET # BLD AUTO: 233 K/UL — SIGNIFICANT CHANGE UP (ref 150–400)
RBC # BLD: 4.11 M/UL — LOW (ref 4.2–5.8)
RBC # FLD: 13 % — SIGNIFICANT CHANGE UP (ref 10.3–14.5)
WBC # BLD: 15.2 K/UL — HIGH (ref 3.8–10.5)
WBC # FLD AUTO: 15.2 K/UL — HIGH (ref 3.8–10.5)

## 2017-09-27 PROCEDURE — P9041: CPT

## 2017-09-27 PROCEDURE — 36514 APHERESIS PLASMA: CPT

## 2017-09-27 PROCEDURE — 99214 OFFICE O/P EST MOD 30 MIN: CPT | Mod: 25

## 2017-09-27 PROCEDURE — 85027 COMPLETE CBC AUTOMATED: CPT

## 2017-10-18 ENCOUNTER — OUTPATIENT (OUTPATIENT)
Dept: OUTPATIENT SERVICES | Facility: HOSPITAL | Age: 72
LOS: 1 days | End: 2017-10-18
Payer: MEDICARE

## 2017-10-18 DIAGNOSIS — H26.8 OTHER SPECIFIED CATARACT: Chronic | ICD-10-CM

## 2017-10-18 DIAGNOSIS — G70.00 MYASTHENIA GRAVIS WITHOUT (ACUTE) EXACERBATION: ICD-10-CM

## 2017-10-18 LAB
HCT VFR BLD CALC: 41.5 % — SIGNIFICANT CHANGE UP (ref 39–50)
HGB BLD-MCNC: 13.4 G/DL — SIGNIFICANT CHANGE UP (ref 13–17)
MCHC RBC-ENTMCNC: 31.8 PG — SIGNIFICANT CHANGE UP (ref 27–34)
MCHC RBC-ENTMCNC: 32.4 GM/DL — SIGNIFICANT CHANGE UP (ref 32–36)
MCV RBC AUTO: 98.4 FL — SIGNIFICANT CHANGE UP (ref 80–100)
PLATELET # BLD AUTO: 236 K/UL — SIGNIFICANT CHANGE UP (ref 150–400)
RBC # BLD: 4.22 M/UL — SIGNIFICANT CHANGE UP (ref 4.2–5.8)
RBC # FLD: 13.1 % — SIGNIFICANT CHANGE UP (ref 10.3–14.5)
WBC # BLD: 14.7 K/UL — HIGH (ref 3.8–10.5)
WBC # FLD AUTO: 14.7 K/UL — HIGH (ref 3.8–10.5)

## 2017-10-18 PROCEDURE — 85027 COMPLETE CBC AUTOMATED: CPT

## 2017-10-18 PROCEDURE — 99214 OFFICE O/P EST MOD 30 MIN: CPT | Mod: 25

## 2017-10-18 PROCEDURE — 36514 APHERESIS PLASMA: CPT

## 2017-10-18 PROCEDURE — P9041: CPT

## 2017-10-18 PROCEDURE — P9045: CPT

## 2017-10-20 ENCOUNTER — OUTPATIENT (OUTPATIENT)
Dept: OUTPATIENT SERVICES | Facility: HOSPITAL | Age: 72
LOS: 1 days | End: 2017-10-20
Payer: MEDICARE

## 2017-10-20 DIAGNOSIS — H26.8 OTHER SPECIFIED CATARACT: Chronic | ICD-10-CM

## 2017-10-20 DIAGNOSIS — G70.00 MYASTHENIA GRAVIS WITHOUT (ACUTE) EXACERBATION: ICD-10-CM

## 2017-10-20 LAB
HCT VFR BLD CALC: 41.6 % — SIGNIFICANT CHANGE UP (ref 39–50)
HGB BLD-MCNC: 13.4 G/DL — SIGNIFICANT CHANGE UP (ref 13–17)
MCHC RBC-ENTMCNC: 31.6 PG — SIGNIFICANT CHANGE UP (ref 27–34)
MCHC RBC-ENTMCNC: 32.3 GM/DL — SIGNIFICANT CHANGE UP (ref 32–36)
MCV RBC AUTO: 98 FL — SIGNIFICANT CHANGE UP (ref 80–100)
PLATELET # BLD AUTO: 235 K/UL — SIGNIFICANT CHANGE UP (ref 150–400)
RBC # BLD: 4.25 M/UL — SIGNIFICANT CHANGE UP (ref 4.2–5.8)
RBC # FLD: 13.1 % — SIGNIFICANT CHANGE UP (ref 10.3–14.5)
WBC # BLD: 18.8 K/UL — HIGH (ref 3.8–10.5)
WBC # FLD AUTO: 18.8 K/UL — HIGH (ref 3.8–10.5)

## 2017-10-20 PROCEDURE — 85027 COMPLETE CBC AUTOMATED: CPT

## 2017-10-20 PROCEDURE — P9045: CPT

## 2017-10-20 PROCEDURE — 36514 APHERESIS PLASMA: CPT

## 2017-10-20 PROCEDURE — 99214 OFFICE O/P EST MOD 30 MIN: CPT | Mod: 25

## 2017-10-20 PROCEDURE — P9041: CPT

## 2017-10-23 ENCOUNTER — OUTPATIENT (OUTPATIENT)
Dept: OUTPATIENT SERVICES | Facility: HOSPITAL | Age: 72
LOS: 1 days | End: 2017-10-23
Payer: MEDICARE

## 2017-10-23 DIAGNOSIS — G70.00 MYASTHENIA GRAVIS WITHOUT (ACUTE) EXACERBATION: ICD-10-CM

## 2017-10-23 DIAGNOSIS — H26.8 OTHER SPECIFIED CATARACT: Chronic | ICD-10-CM

## 2017-10-23 LAB
HCT VFR BLD CALC: 39.8 % — SIGNIFICANT CHANGE UP (ref 39–50)
HGB BLD-MCNC: 13.2 G/DL — SIGNIFICANT CHANGE UP (ref 13–17)
MCHC RBC-ENTMCNC: 32.6 PG — SIGNIFICANT CHANGE UP (ref 27–34)
MCHC RBC-ENTMCNC: 33.1 GM/DL — SIGNIFICANT CHANGE UP (ref 32–36)
MCV RBC AUTO: 98.3 FL — SIGNIFICANT CHANGE UP (ref 80–100)
PLATELET # BLD AUTO: 238 K/UL — SIGNIFICANT CHANGE UP (ref 150–400)
RBC # BLD: 4.05 M/UL — LOW (ref 4.2–5.8)
RBC # FLD: 13.2 % — SIGNIFICANT CHANGE UP (ref 10.3–14.5)
WBC # BLD: 14.4 K/UL — HIGH (ref 3.8–10.5)
WBC # FLD AUTO: 14.4 K/UL — HIGH (ref 3.8–10.5)

## 2017-10-23 PROCEDURE — 36514 APHERESIS PLASMA: CPT

## 2017-10-23 PROCEDURE — 85027 COMPLETE CBC AUTOMATED: CPT

## 2017-10-23 PROCEDURE — P9041: CPT

## 2017-10-23 PROCEDURE — 99214 OFFICE O/P EST MOD 30 MIN: CPT | Mod: 25

## 2017-11-13 ENCOUNTER — OUTPATIENT (OUTPATIENT)
Dept: OUTPATIENT SERVICES | Facility: HOSPITAL | Age: 72
LOS: 1 days | End: 2017-11-13
Payer: MEDICARE

## 2017-11-13 DIAGNOSIS — G70.00 MYASTHENIA GRAVIS WITHOUT (ACUTE) EXACERBATION: ICD-10-CM

## 2017-11-13 DIAGNOSIS — H26.8 OTHER SPECIFIED CATARACT: Chronic | ICD-10-CM

## 2017-11-13 LAB
HCT VFR BLD CALC: 40.5 % — SIGNIFICANT CHANGE UP (ref 39–50)
HGB BLD-MCNC: 13.2 G/DL — SIGNIFICANT CHANGE UP (ref 13–17)
MCHC RBC-ENTMCNC: 32 PG — SIGNIFICANT CHANGE UP (ref 27–34)
MCHC RBC-ENTMCNC: 32.7 GM/DL — SIGNIFICANT CHANGE UP (ref 32–36)
MCV RBC AUTO: 97.7 FL — SIGNIFICANT CHANGE UP (ref 80–100)
PLATELET # BLD AUTO: 219 K/UL — SIGNIFICANT CHANGE UP (ref 150–400)
RBC # BLD: 4.14 M/UL — LOW (ref 4.2–5.8)
RBC # FLD: 13.2 % — SIGNIFICANT CHANGE UP (ref 10.3–14.5)
WBC # BLD: 14.7 K/UL — HIGH (ref 3.8–10.5)
WBC # FLD AUTO: 14.7 K/UL — HIGH (ref 3.8–10.5)

## 2017-11-13 PROCEDURE — 36514 APHERESIS PLASMA: CPT

## 2017-11-13 PROCEDURE — 99214 OFFICE O/P EST MOD 30 MIN: CPT | Mod: 25

## 2017-11-13 PROCEDURE — P9041: CPT

## 2017-11-13 PROCEDURE — 85027 COMPLETE CBC AUTOMATED: CPT

## 2017-11-15 ENCOUNTER — OUTPATIENT (OUTPATIENT)
Dept: OUTPATIENT SERVICES | Facility: HOSPITAL | Age: 72
LOS: 1 days | End: 2017-11-15
Payer: MEDICARE

## 2017-11-15 DIAGNOSIS — H26.8 OTHER SPECIFIED CATARACT: Chronic | ICD-10-CM

## 2017-11-15 DIAGNOSIS — G70.00 MYASTHENIA GRAVIS WITHOUT (ACUTE) EXACERBATION: ICD-10-CM

## 2017-11-15 LAB
HCT VFR BLD CALC: 42 % — SIGNIFICANT CHANGE UP (ref 39–50)
HGB BLD-MCNC: 13.7 G/DL — SIGNIFICANT CHANGE UP (ref 13–17)
MCHC RBC-ENTMCNC: 32 PG — SIGNIFICANT CHANGE UP (ref 27–34)
MCHC RBC-ENTMCNC: 32.7 GM/DL — SIGNIFICANT CHANGE UP (ref 32–36)
MCV RBC AUTO: 97.9 FL — SIGNIFICANT CHANGE UP (ref 80–100)
PLATELET # BLD AUTO: 250 K/UL — SIGNIFICANT CHANGE UP (ref 150–400)
RBC # BLD: 4.29 M/UL — SIGNIFICANT CHANGE UP (ref 4.2–5.8)
RBC # FLD: 13.2 % — SIGNIFICANT CHANGE UP (ref 10.3–14.5)
WBC # BLD: 17.8 K/UL — HIGH (ref 3.8–10.5)
WBC # FLD AUTO: 17.8 K/UL — HIGH (ref 3.8–10.5)

## 2017-11-15 PROCEDURE — 36514 APHERESIS PLASMA: CPT

## 2017-11-15 PROCEDURE — 85027 COMPLETE CBC AUTOMATED: CPT

## 2017-11-15 PROCEDURE — P9041: CPT

## 2017-11-15 PROCEDURE — 99214 OFFICE O/P EST MOD 30 MIN: CPT | Mod: 25

## 2017-11-17 ENCOUNTER — EMERGENCY (EMERGENCY)
Facility: HOSPITAL | Age: 72
LOS: 1 days | Discharge: HOME | End: 2017-11-17
Attending: EMERGENCY MEDICINE | Admitting: EMERGENCY MEDICINE
Payer: MEDICARE

## 2017-11-17 VITALS
DIASTOLIC BLOOD PRESSURE: 83 MMHG | SYSTOLIC BLOOD PRESSURE: 175 MMHG | OXYGEN SATURATION: 98 % | HEIGHT: 71 IN | HEART RATE: 81 BPM | TEMPERATURE: 98 F | RESPIRATION RATE: 16 BRPM | WEIGHT: 164.02 LBS

## 2017-11-17 VITALS
OXYGEN SATURATION: 97 % | DIASTOLIC BLOOD PRESSURE: 72 MMHG | SYSTOLIC BLOOD PRESSURE: 160 MMHG | RESPIRATION RATE: 14 BRPM | TEMPERATURE: 99 F | HEART RATE: 77 BPM

## 2017-11-17 DIAGNOSIS — H26.8 OTHER SPECIFIED CATARACT: Chronic | ICD-10-CM

## 2017-11-17 PROCEDURE — 99284 EMERGENCY DEPT VISIT MOD MDM: CPT | Mod: 25

## 2017-11-17 PROCEDURE — 72131 CT LUMBAR SPINE W/O DYE: CPT

## 2017-11-17 PROCEDURE — 72131 CT LUMBAR SPINE W/O DYE: CPT | Mod: 26

## 2017-11-17 PROCEDURE — 99284 EMERGENCY DEPT VISIT MOD MDM: CPT

## 2017-11-17 RX ORDER — LIDOCAINE 4 G/100G
1 CREAM TOPICAL
Qty: 30 | Refills: 0 | OUTPATIENT
Start: 2017-11-17 | End: 2017-12-17

## 2017-11-17 RX ORDER — LIDOCAINE 4 G/100G
1 CREAM TOPICAL ONCE
Qty: 0 | Refills: 0 | Status: COMPLETED | OUTPATIENT
Start: 2017-11-17 | End: 2017-11-17

## 2017-11-17 RX ORDER — ACETAMINOPHEN 500 MG
975 TABLET ORAL ONCE
Qty: 0 | Refills: 0 | Status: COMPLETED | OUTPATIENT
Start: 2017-11-17 | End: 2017-11-17

## 2017-11-17 RX ADMIN — Medication 975 MILLIGRAM(S): at 14:04

## 2017-11-17 RX ADMIN — Medication 975 MILLIGRAM(S): at 14:00

## 2017-11-17 RX ADMIN — LIDOCAINE 1 PATCH: 4 CREAM TOPICAL at 14:01

## 2017-11-17 NOTE — ED PROVIDER NOTE - PROGRESS NOTE DETAILS
patient feels better  results discussed; pain likely musculoskeletal  will discharge home and f/u with pmd  patient and wife agreeable with plan

## 2017-11-17 NOTE — ED ADULT NURSE NOTE - PMH
Atrial fibrillation, unspecified type    Benign prostatic hyperplasia, presence of lower urinary tract symptoms unspecified, unspecified morphology    GERD (gastroesophageal reflux disease)    HLD (hyperlipidemia)    Iron deficiency anemia    Myasthenia gravis    Nasal polyp    TIA (transient ischemic attack)  2006

## 2017-11-17 NOTE — ED ADULT NURSE NOTE - OBJECTIVE STATEMENT
Patient alert and oriented x 3 complaining of left lower back pain since Monday with pain level 4/10 denies any trauma to area seen and evalauted by Dr. Sue.

## 2017-11-17 NOTE — ED PROVIDER NOTE - OBJECTIVE STATEMENT
73 yo male with hx myasthenia gravis, a fib, hld c/o left lower back pain  x 1 week. Pain when moving, increased when going from lying to standing position. Pain doenst move anywhere. c/o chronic weakness to legs, but denies any new weakness or numbness. Patient ambulates with cane. Patient denies any fall or known injury, but did lift a heavy object 1-2 weeks ago. denies any problems with bladder/bowel function. Patient thought it was his kidney, so went to Dr. Geronimo today and reports he had normal renal ultrasound and normal UA. Patient denies any dysuria, urinary frequency or hematuria

## 2017-11-18 ENCOUNTER — OUTPATIENT (OUTPATIENT)
Dept: OUTPATIENT SERVICES | Facility: HOSPITAL | Age: 72
LOS: 1 days | End: 2017-11-18
Payer: MEDICARE

## 2017-11-18 DIAGNOSIS — H26.8 OTHER SPECIFIED CATARACT: Chronic | ICD-10-CM

## 2017-11-18 DIAGNOSIS — G70.00 MYASTHENIA GRAVIS WITHOUT (ACUTE) EXACERBATION: ICD-10-CM

## 2017-11-18 LAB
HCT VFR BLD CALC: 42.6 % — SIGNIFICANT CHANGE UP (ref 39–50)
HGB BLD-MCNC: 13.8 G/DL — SIGNIFICANT CHANGE UP (ref 13–17)
MCHC RBC-ENTMCNC: 31.8 PG — SIGNIFICANT CHANGE UP (ref 27–34)
MCHC RBC-ENTMCNC: 32.4 GM/DL — SIGNIFICANT CHANGE UP (ref 32–36)
MCV RBC AUTO: 98.2 FL — SIGNIFICANT CHANGE UP (ref 80–100)
PLATELET # BLD AUTO: 259 K/UL — SIGNIFICANT CHANGE UP (ref 150–400)
RBC # BLD: 4.34 M/UL — SIGNIFICANT CHANGE UP (ref 4.2–5.8)
RBC # FLD: 13.5 % — SIGNIFICANT CHANGE UP (ref 10.3–14.5)
WBC # BLD: 15.1 K/UL — HIGH (ref 3.8–10.5)
WBC # FLD AUTO: 15.1 K/UL — HIGH (ref 3.8–10.5)

## 2017-11-18 PROCEDURE — 36514 APHERESIS PLASMA: CPT

## 2017-11-18 PROCEDURE — 99213 OFFICE O/P EST LOW 20 MIN: CPT | Mod: 25

## 2017-11-18 PROCEDURE — 85027 COMPLETE CBC AUTOMATED: CPT

## 2017-11-18 PROCEDURE — P9041: CPT

## 2017-11-18 PROCEDURE — P9045: CPT

## 2017-12-03 ENCOUNTER — INPATIENT (INPATIENT)
Facility: HOSPITAL | Age: 72
LOS: 2 days | Discharge: HOME | DRG: 872 | End: 2017-12-06
Attending: HOSPITALIST | Admitting: HOSPITALIST
Payer: MEDICARE

## 2017-12-03 VITALS
HEART RATE: 125 BPM | RESPIRATION RATE: 16 BRPM | SYSTOLIC BLOOD PRESSURE: 129 MMHG | HEIGHT: 71 IN | DIASTOLIC BLOOD PRESSURE: 73 MMHG | WEIGHT: 182.1 LBS | TEMPERATURE: 103 F | OXYGEN SATURATION: 94 %

## 2017-12-03 DIAGNOSIS — H26.8 OTHER SPECIFIED CATARACT: Chronic | ICD-10-CM

## 2017-12-03 DIAGNOSIS — N10 ACUTE PYELONEPHRITIS: ICD-10-CM

## 2017-12-03 DIAGNOSIS — R33.9 RETENTION OF URINE, UNSPECIFIED: ICD-10-CM

## 2017-12-03 DIAGNOSIS — D50.9 IRON DEFICIENCY ANEMIA, UNSPECIFIED: ICD-10-CM

## 2017-12-03 DIAGNOSIS — G70.00 MYASTHENIA GRAVIS WITHOUT (ACUTE) EXACERBATION: ICD-10-CM

## 2017-12-03 DIAGNOSIS — A41.9 SEPSIS, UNSPECIFIED ORGANISM: ICD-10-CM

## 2017-12-03 DIAGNOSIS — E78.5 HYPERLIPIDEMIA, UNSPECIFIED: ICD-10-CM

## 2017-12-03 DIAGNOSIS — N39.0 URINARY TRACT INFECTION, SITE NOT SPECIFIED: ICD-10-CM

## 2017-12-03 DIAGNOSIS — F41.9 ANXIETY DISORDER, UNSPECIFIED: ICD-10-CM

## 2017-12-03 DIAGNOSIS — B00.9 HERPESVIRAL INFECTION, UNSPECIFIED: ICD-10-CM

## 2017-12-03 DIAGNOSIS — Z98.890 OTHER SPECIFIED POSTPROCEDURAL STATES: Chronic | ICD-10-CM

## 2017-12-03 DIAGNOSIS — K21.9 GASTRO-ESOPHAGEAL REFLUX DISEASE WITHOUT ESOPHAGITIS: ICD-10-CM

## 2017-12-03 DIAGNOSIS — Z29.9 ENCOUNTER FOR PROPHYLACTIC MEASURES, UNSPECIFIED: ICD-10-CM

## 2017-12-03 DIAGNOSIS — H40.9 UNSPECIFIED GLAUCOMA: ICD-10-CM

## 2017-12-03 DIAGNOSIS — I48.91 UNSPECIFIED ATRIAL FIBRILLATION: ICD-10-CM

## 2017-12-03 LAB
ALBUMIN SERPL ELPH-MCNC: 3.4 G/DL — SIGNIFICANT CHANGE UP (ref 3.3–5)
ALP SERPL-CCNC: 87 U/L — SIGNIFICANT CHANGE UP (ref 40–120)
ALT FLD-CCNC: 23 U/L — SIGNIFICANT CHANGE UP (ref 12–78)
ANION GAP SERPL CALC-SCNC: 9 MMOL/L — SIGNIFICANT CHANGE UP (ref 5–17)
APPEARANCE UR: CLEAR — SIGNIFICANT CHANGE UP
APTT BLD: 28 SEC — SIGNIFICANT CHANGE UP (ref 27.5–37.4)
AST SERPL-CCNC: 18 U/L — SIGNIFICANT CHANGE UP (ref 15–37)
BILIRUB SERPL-MCNC: 0.6 MG/DL — SIGNIFICANT CHANGE UP (ref 0.2–1.2)
BILIRUB UR-MCNC: NEGATIVE — SIGNIFICANT CHANGE UP
BUN SERPL-MCNC: 16 MG/DL — SIGNIFICANT CHANGE UP (ref 7–23)
CALCIUM SERPL-MCNC: 9.1 MG/DL — SIGNIFICANT CHANGE UP (ref 8.5–10.1)
CHLORIDE SERPL-SCNC: 105 MMOL/L — SIGNIFICANT CHANGE UP (ref 96–108)
CO2 SERPL-SCNC: 25 MMOL/L — SIGNIFICANT CHANGE UP (ref 22–31)
COLOR SPEC: YELLOW — SIGNIFICANT CHANGE UP
CREAT SERPL-MCNC: 0.75 MG/DL — SIGNIFICANT CHANGE UP (ref 0.5–1.3)
DIFF PNL FLD: ABNORMAL
GLUCOSE SERPL-MCNC: 110 MG/DL — HIGH (ref 70–99)
GLUCOSE UR QL: NEGATIVE — SIGNIFICANT CHANGE UP
HCT VFR BLD CALC: 43.1 % — SIGNIFICANT CHANGE UP (ref 39–50)
HGB BLD-MCNC: 13.8 G/DL — SIGNIFICANT CHANGE UP (ref 13–17)
INR BLD: 1.13 RATIO — SIGNIFICANT CHANGE UP (ref 0.88–1.16)
KETONES UR-MCNC: NEGATIVE — SIGNIFICANT CHANGE UP
LACTATE SERPL-SCNC: 1.7 MMOL/L — SIGNIFICANT CHANGE UP (ref 0.7–2)
LEUKOCYTE ESTERASE UR-ACNC: ABNORMAL
MCHC RBC-ENTMCNC: 30.5 PG — SIGNIFICANT CHANGE UP (ref 27–34)
MCHC RBC-ENTMCNC: 32.1 GM/DL — SIGNIFICANT CHANGE UP (ref 32–36)
MCV RBC AUTO: 95.1 FL — SIGNIFICANT CHANGE UP (ref 80–100)
MONOCYTES NFR BLD AUTO: 5 % — SIGNIFICANT CHANGE UP (ref 1–9)
NEUTROPHILS NFR BLD AUTO: 84 % — HIGH (ref 43–77)
NITRITE UR-MCNC: POSITIVE
PH UR: 8 — SIGNIFICANT CHANGE UP (ref 5–8)
PLATELET # BLD AUTO: 252 K/UL — SIGNIFICANT CHANGE UP (ref 150–400)
POTASSIUM SERPL-MCNC: 3.7 MMOL/L — SIGNIFICANT CHANGE UP (ref 3.5–5.3)
POTASSIUM SERPL-SCNC: 3.7 MMOL/L — SIGNIFICANT CHANGE UP (ref 3.5–5.3)
PROT SERPL-MCNC: 6.2 G/DL — SIGNIFICANT CHANGE UP (ref 6–8.3)
PROT UR-MCNC: NEGATIVE — SIGNIFICANT CHANGE UP
PROTHROM AB SERPL-ACNC: 12.3 SEC — SIGNIFICANT CHANGE UP (ref 9.8–12.7)
RAPID RVP RESULT: SIGNIFICANT CHANGE UP
RBC # BLD: 4.53 M/UL — SIGNIFICANT CHANGE UP (ref 4.2–5.8)
RBC # FLD: 14.1 % — SIGNIFICANT CHANGE UP (ref 10.3–14.5)
SODIUM SERPL-SCNC: 139 MMOL/L — SIGNIFICANT CHANGE UP (ref 135–145)
SP GR SPEC: 1.01 — SIGNIFICANT CHANGE UP (ref 1.01–1.02)
UROBILINOGEN FLD QL: NEGATIVE — SIGNIFICANT CHANGE UP
WBC # BLD: 18.6 K/UL — HIGH (ref 3.8–10.5)
WBC # FLD AUTO: 18.6 K/UL — HIGH (ref 3.8–10.5)

## 2017-12-03 PROCEDURE — 93010 ELECTROCARDIOGRAM REPORT: CPT

## 2017-12-03 PROCEDURE — 99285 EMERGENCY DEPT VISIT HI MDM: CPT

## 2017-12-03 PROCEDURE — 99223 1ST HOSP IP/OBS HIGH 75: CPT | Mod: AI,GC

## 2017-12-03 PROCEDURE — 71010: CPT | Mod: 26

## 2017-12-03 RX ORDER — OMEGA-3 ACID ETHYL ESTERS 1 G
2 CAPSULE ORAL
Qty: 0 | Refills: 0 | Status: DISCONTINUED | OUTPATIENT
Start: 2017-12-03 | End: 2017-12-06

## 2017-12-03 RX ORDER — AZTREONAM 2 G
2000 VIAL (EA) INJECTION ONCE
Qty: 0 | Refills: 0 | Status: COMPLETED | OUTPATIENT
Start: 2017-12-03 | End: 2017-12-03

## 2017-12-03 RX ORDER — SODIUM CHLORIDE 9 MG/ML
1000 INJECTION INTRAMUSCULAR; INTRAVENOUS; SUBCUTANEOUS
Qty: 0 | Refills: 0 | Status: DISCONTINUED | OUTPATIENT
Start: 2017-12-03 | End: 2017-12-03

## 2017-12-03 RX ORDER — DILTIAZEM HCL 120 MG
180 CAPSULE, EXT RELEASE 24 HR ORAL
Qty: 0 | Refills: 0 | Status: DISCONTINUED | OUTPATIENT
Start: 2017-12-03 | End: 2017-12-06

## 2017-12-03 RX ORDER — CEFTRIAXONE 500 MG/1
1 INJECTION, POWDER, FOR SOLUTION INTRAMUSCULAR; INTRAVENOUS EVERY 24 HOURS
Qty: 0 | Refills: 0 | Status: DISCONTINUED | OUTPATIENT
Start: 2017-12-04 | End: 2017-12-06

## 2017-12-03 RX ORDER — VANCOMYCIN HCL 1 G
1000 VIAL (EA) INTRAVENOUS EVERY 12 HOURS
Qty: 0 | Refills: 0 | Status: DISCONTINUED | OUTPATIENT
Start: 2017-12-03 | End: 2017-12-03

## 2017-12-03 RX ORDER — MYCOPHENOLATE MOFETIL 250 MG/1
500 CAPSULE ORAL
Qty: 0 | Refills: 0 | Status: DISCONTINUED | OUTPATIENT
Start: 2017-12-03 | End: 2017-12-03

## 2017-12-03 RX ORDER — ACETAMINOPHEN 500 MG
975 TABLET ORAL ONCE
Qty: 0 | Refills: 0 | Status: COMPLETED | OUTPATIENT
Start: 2017-12-03 | End: 2017-12-03

## 2017-12-03 RX ORDER — VANCOMYCIN HCL 1 G
1000 VIAL (EA) INTRAVENOUS ONCE
Qty: 0 | Refills: 0 | Status: COMPLETED | OUTPATIENT
Start: 2017-12-03 | End: 2017-12-03

## 2017-12-03 RX ORDER — FERROUS SULFATE 325(65) MG
325 TABLET ORAL DAILY
Qty: 0 | Refills: 0 | Status: DISCONTINUED | OUTPATIENT
Start: 2017-12-03 | End: 2017-12-06

## 2017-12-03 RX ORDER — DOCUSATE SODIUM 100 MG
50 CAPSULE ORAL AT BEDTIME
Qty: 0 | Refills: 0 | Status: DISCONTINUED | OUTPATIENT
Start: 2017-12-03 | End: 2017-12-03

## 2017-12-03 RX ORDER — PYRIDOSTIGMINE BROMIDE 60 MG/5ML
180 SOLUTION ORAL AT BEDTIME
Qty: 0 | Refills: 0 | Status: DISCONTINUED | OUTPATIENT
Start: 2017-12-03 | End: 2017-12-06

## 2017-12-03 RX ORDER — MIRTAZAPINE 45 MG/1
7.5 TABLET, ORALLY DISINTEGRATING ORAL AT BEDTIME
Qty: 0 | Refills: 0 | Status: DISCONTINUED | OUTPATIENT
Start: 2017-12-03 | End: 2017-12-06

## 2017-12-03 RX ORDER — PYRIDOSTIGMINE BROMIDE 60 MG/5ML
60 SOLUTION ORAL
Qty: 0 | Refills: 0 | Status: DISCONTINUED | OUTPATIENT
Start: 2017-12-03 | End: 2017-12-06

## 2017-12-03 RX ORDER — CHOLECALCIFEROL (VITAMIN D3) 125 MCG
2000 CAPSULE ORAL DAILY
Qty: 0 | Refills: 0 | Status: DISCONTINUED | OUTPATIENT
Start: 2017-12-03 | End: 2017-12-06

## 2017-12-03 RX ORDER — MULTIVIT-MIN/FERROUS GLUCONATE 9 MG/15 ML
1 LIQUID (ML) ORAL DAILY
Qty: 0 | Refills: 0 | Status: DISCONTINUED | OUTPATIENT
Start: 2017-12-03 | End: 2017-12-06

## 2017-12-03 RX ORDER — TAMSULOSIN HYDROCHLORIDE 0.4 MG/1
0.4 CAPSULE ORAL AT BEDTIME
Qty: 0 | Refills: 0 | Status: DISCONTINUED | OUTPATIENT
Start: 2017-12-03 | End: 2017-12-06

## 2017-12-03 RX ORDER — ACETAMINOPHEN 500 MG
975 TABLET ORAL ONCE
Qty: 0 | Refills: 0 | Status: DISCONTINUED | OUTPATIENT
Start: 2017-12-03 | End: 2017-12-03

## 2017-12-03 RX ORDER — SODIUM CHLORIDE 9 MG/ML
3 INJECTION INTRAMUSCULAR; INTRAVENOUS; SUBCUTANEOUS ONCE
Qty: 0 | Refills: 0 | Status: COMPLETED | OUTPATIENT
Start: 2017-12-03 | End: 2017-12-03

## 2017-12-03 RX ORDER — SENNA PLUS 8.6 MG/1
2 TABLET ORAL AT BEDTIME
Qty: 0 | Refills: 0 | Status: DISCONTINUED | OUTPATIENT
Start: 2017-12-03 | End: 2017-12-03

## 2017-12-03 RX ORDER — ASCORBIC ACID 60 MG
1000 TABLET,CHEWABLE ORAL DAILY
Qty: 0 | Refills: 0 | Status: DISCONTINUED | OUTPATIENT
Start: 2017-12-03 | End: 2017-12-03

## 2017-12-03 RX ORDER — CEFTRIAXONE 500 MG/1
1 INJECTION, POWDER, FOR SOLUTION INTRAMUSCULAR; INTRAVENOUS ONCE
Qty: 0 | Refills: 0 | Status: COMPLETED | OUTPATIENT
Start: 2017-12-03 | End: 2017-12-03

## 2017-12-03 RX ORDER — ASCORBIC ACID 60 MG
1000 TABLET,CHEWABLE ORAL DAILY
Qty: 0 | Refills: 0 | Status: DISCONTINUED | OUTPATIENT
Start: 2017-12-03 | End: 2017-12-06

## 2017-12-03 RX ORDER — MYCOPHENOLATE MOFETIL 250 MG/1
1000 CAPSULE ORAL
Qty: 0 | Refills: 0 | Status: DISCONTINUED | OUTPATIENT
Start: 2017-12-03 | End: 2017-12-06

## 2017-12-03 RX ORDER — LATANOPROST 0.05 MG/ML
1 SOLUTION/ DROPS OPHTHALMIC; TOPICAL AT BEDTIME
Qty: 0 | Refills: 0 | Status: DISCONTINUED | OUTPATIENT
Start: 2017-12-03 | End: 2017-12-06

## 2017-12-03 RX ORDER — SENNA PLUS 8.6 MG/1
1 TABLET ORAL AT BEDTIME
Qty: 0 | Refills: 0 | Status: DISCONTINUED | OUTPATIENT
Start: 2017-12-03 | End: 2017-12-03

## 2017-12-03 RX ORDER — ACETAMINOPHEN 500 MG
650 TABLET ORAL EVERY 6 HOURS
Qty: 0 | Refills: 0 | Status: DISCONTINUED | OUTPATIENT
Start: 2017-12-03 | End: 2017-12-06

## 2017-12-03 RX ORDER — DABIGATRAN ETEXILATE MESYLATE 150 MG/1
150 CAPSULE ORAL EVERY 12 HOURS
Qty: 0 | Refills: 0 | Status: DISCONTINUED | OUTPATIENT
Start: 2017-12-03 | End: 2017-12-06

## 2017-12-03 RX ORDER — CEFTRIAXONE 500 MG/1
INJECTION, POWDER, FOR SOLUTION INTRAMUSCULAR; INTRAVENOUS
Qty: 0 | Refills: 0 | Status: DISCONTINUED | OUTPATIENT
Start: 2017-12-03 | End: 2017-12-06

## 2017-12-03 RX ORDER — DOCUSATE SODIUM 100 MG
100 CAPSULE ORAL AT BEDTIME
Qty: 0 | Refills: 0 | Status: DISCONTINUED | OUTPATIENT
Start: 2017-12-03 | End: 2017-12-06

## 2017-12-03 RX ORDER — SODIUM CHLORIDE 9 MG/ML
500 INJECTION INTRAMUSCULAR; INTRAVENOUS; SUBCUTANEOUS
Qty: 0 | Refills: 0 | Status: COMPLETED | OUTPATIENT
Start: 2017-12-03 | End: 2017-12-03

## 2017-12-03 RX ORDER — CHOLECALCIFEROL (VITAMIN D3) 125 MCG
2000 CAPSULE ORAL DAILY
Qty: 0 | Refills: 0 | Status: DISCONTINUED | OUTPATIENT
Start: 2017-12-03 | End: 2017-12-03

## 2017-12-03 RX ORDER — SENNA PLUS 8.6 MG/1
1 TABLET ORAL AT BEDTIME
Qty: 0 | Refills: 0 | Status: DISCONTINUED | OUTPATIENT
Start: 2017-12-03 | End: 2017-12-06

## 2017-12-03 RX ADMIN — SENNA PLUS 1 TABLET(S): 8.6 TABLET ORAL at 21:47

## 2017-12-03 RX ADMIN — Medication 125 MILLIGRAM(S): at 15:38

## 2017-12-03 RX ADMIN — Medication 1 TABLET(S): at 18:43

## 2017-12-03 RX ADMIN — DABIGATRAN ETEXILATE MESYLATE 150 MILLIGRAM(S): 150 CAPSULE ORAL at 18:43

## 2017-12-03 RX ADMIN — Medication 975 MILLIGRAM(S): at 12:14

## 2017-12-03 RX ADMIN — Medication 100 MILLIGRAM(S): at 21:47

## 2017-12-03 RX ADMIN — SODIUM CHLORIDE 2000 MILLILITER(S): 9 INJECTION INTRAMUSCULAR; INTRAVENOUS; SUBCUTANEOUS at 12:50

## 2017-12-03 RX ADMIN — MYCOPHENOLATE MOFETIL 1000 MILLIGRAM(S): 250 CAPSULE ORAL at 18:49

## 2017-12-03 RX ADMIN — CEFTRIAXONE 100 GRAM(S): 500 INJECTION, POWDER, FOR SOLUTION INTRAMUSCULAR; INTRAVENOUS at 18:50

## 2017-12-03 RX ADMIN — SODIUM CHLORIDE 2000 MILLILITER(S): 9 INJECTION INTRAMUSCULAR; INTRAVENOUS; SUBCUTANEOUS at 13:35

## 2017-12-03 RX ADMIN — Medication 250 MILLIGRAM(S): at 13:35

## 2017-12-03 RX ADMIN — SODIUM CHLORIDE 2000 MILLILITER(S): 9 INJECTION INTRAMUSCULAR; INTRAVENOUS; SUBCUTANEOUS at 13:05

## 2017-12-03 RX ADMIN — TAMSULOSIN HYDROCHLORIDE 0.4 MILLIGRAM(S): 0.4 CAPSULE ORAL at 23:29

## 2017-12-03 RX ADMIN — Medication 2 GRAM(S): at 18:43

## 2017-12-03 RX ADMIN — SODIUM CHLORIDE 3 MILLILITER(S): 9 INJECTION INTRAMUSCULAR; INTRAVENOUS; SUBCUTANEOUS at 11:55

## 2017-12-03 RX ADMIN — SODIUM CHLORIDE 2000 MILLILITER(S): 9 INJECTION INTRAMUSCULAR; INTRAVENOUS; SUBCUTANEOUS at 12:30

## 2017-12-03 RX ADMIN — PYRIDOSTIGMINE BROMIDE 180 MILLIGRAM(S): 60 SOLUTION ORAL at 21:47

## 2017-12-03 RX ADMIN — LATANOPROST 1 DROP(S): 0.05 SOLUTION/ DROPS OPHTHALMIC; TOPICAL at 21:47

## 2017-12-03 RX ADMIN — MIRTAZAPINE 7.5 MILLIGRAM(S): 45 TABLET, ORALLY DISINTEGRATING ORAL at 21:47

## 2017-12-03 RX ADMIN — Medication 50 MILLIGRAM(S): at 12:30

## 2017-12-03 RX ADMIN — PYRIDOSTIGMINE BROMIDE 60 MILLIGRAM(S): 60 SOLUTION ORAL at 14:56

## 2017-12-03 RX ADMIN — SODIUM CHLORIDE 2000 MILLILITER(S): 9 INJECTION INTRAMUSCULAR; INTRAVENOUS; SUBCUTANEOUS at 12:15

## 2017-12-03 RX ADMIN — PYRIDOSTIGMINE BROMIDE 60 MILLIGRAM(S): 60 SOLUTION ORAL at 18:43

## 2017-12-03 NOTE — H&P ADULT - PROBLEM SELECTOR PLAN 9
DVT PPX  fall risk, ambulate with assistance, OOB with assistance  Diet Dash/TLC  Continue with fish oil, vit c, vit d, calcium, multivitamin Stable - Continue with iron

## 2017-12-03 NOTE — ED ADULT NURSE REASSESSMENT NOTE - NS ED NURSE REASSESS COMMENT FT1
Pt continues to void without difficulty; voided 500ml to urinal.  Spoke with Dr. Faustin re: coker, she states at this time no coker and bladder scan can be repeated in a few hours to check residual.

## 2017-12-03 NOTE — H&P ADULT - NSHPPHYSICALEXAM_GEN_ALL_CORE
Physical Exam:  General: Well developed, well nourished, NAD  HEENT: NCAT, PERRLA, EOMI bl, moist mucous membranes   Neck: Supple, nontender, no mass  Neurology: A&Ox3, nonfocal, CN II-XII grossly intact, sensation intact, no gait abnormalities   Respiratory: CTA B/L, No W/R/R  CV: RRR, +S1/S2, no murmurs, rubs or gallops  Abdominal: Soft, NT, ND +BSx4  Extremities: No C/C/E, + peripheral pulses  MSK: Normal ROM, no joint erythema or warmth, no joint swelling   Skin: warm, dry, normal color, no rash or abnormal lesions Physical Exam:  General:  NAD  HEENT: NCAT, PERRLA, moist mucous membranes, (chronic) erythematous head/neck  Neck: Supple, nontender, no mass  Neurology: A&Ox3, motor intact, sensation intact  Respiratory: CTA B/L, No W/R/R  CV: RRR, +S1/S2, no murmurs, rubs or gallops  Abdominal: Soft, TTP in RLQ and LLQ, ND +BSx4, no CVA tenderness  Extremities: No C/C/E, + peripheral pulses  MSK: Normal ROM, no joint erythema or warmth, no joint swelling   Skin: warm, dry, normal color, no rash or abnormal lesions

## 2017-12-03 NOTE — CONSULT NOTE ADULT - PROBLEM SELECTOR RECOMMENDATION 4
Multiple antibiotics present concerns when used in MG. Recommended to avoid: aminoglycosides, clindamycin, fluoroquinolones, Vancomycin,  Ketolides (semisynthetic macrolides) -such as telithromycin. Ceftriaxone and extended spectrum penicillins would be reasonable choices as long as not given IM. PCN adverse reaction as localized swelling 60+ years ago would be my basis for recommending therapy with Ceftriaxone pending further data.    Thank you for consulting us and involving us in the management of this most interesting and challenging case.     We will follow along in the care of this patient.

## 2017-12-03 NOTE — H&P ADULT - PROBLEM SELECTOR PLAN 4
Stable - Continue with statin Stable Continue with flomax Stable - continue with mestinon, prednisone, cellcept  Stress dose IV solumedrol x1   Consult Dr. Fernando

## 2017-12-03 NOTE — H&P ADULT - NSHPOUTPATIENTPROVIDERS_GEN_ALL_CORE
Dr. Hermilo Goodrich for cardio, Dr. Fernnado for Neuro, Dr. Hi for ENT Dr. Akhtar, Dr. Vicki Goodrich for cardio, Dr. Fernando for Neuro, Dr. Hi for ENT, Dr. Geronimo (urology)

## 2017-12-03 NOTE — H&P ADULT - PROBLEM SELECTOR PLAN 3
Controlled - continue with Stable - continue with mestinon  Increase prednisone Controlled - continue with cardizem (hold parameters) and pradaxa

## 2017-12-03 NOTE — ED PROVIDER NOTE - OBJECTIVE STATEMENT
73 yo male with hx hld, a fib, myasthenia gravis bibems due to fever and chills which started this am. denies vomiting or diarrhea. denies cough or shortness of breath or chest pain. denies headache. denies dysuria or back pain.

## 2017-12-03 NOTE — ED PROVIDER NOTE - PROGRESS NOTE DETAILS
patient needs IVF and anitbiotics for sepsis; multiple antibiotics to be avoided in MG; will use broad spectrum antibiotics, which should have minimal interaction patient to be admitted, UTI/Sepsis  d/w hospitalist Dr. Faustin consult placed to patients neurologist, Dr. Fernando

## 2017-12-03 NOTE — PATIENT PROFILE ADULT. - VISION (WITH CORRECTIVE LENSES IF THE PATIENT USUALLY WEARS THEM):
Normal vision: sees adequately in most situations; can see medication labels, newsprint/uses glasses Partially impaired: cannot see medication labels or newsprint, but can see obstacles in path, and the surrounding layout; can count fingers at arm's length/uses glasses

## 2017-12-03 NOTE — H&P ADULT - HISTORY OF PRESENT ILLNESS
72 y/o M PMHx of HLD, A-fib, MG (diagnosed @ 04/2016), TIA @ 2006, iron deficiency anemia presenting with     In the ED, febrile 102.8, , /73, sat 94% RA, RR 16, WBC 18.6, H/H 13.8/43.1, plat 252, Bands 9.0, Coags PT 12.3, INR 1.13, PTT 28, BUN 16, Cr 0.75, Glu 110, UA positive nitrites, trace leuk, mod blood, wbc 11-25, rbc 6-10, many bacteria, CXR There are prominent bronchovascular markings at the left lower lung zone.No lobar lung consolidation significant pleural effusion or pneumothorax is noted. Received IV azactam, vanco, tylenol 70 y/o M PMHx of HLD, A-fib, MG (diagnosed @ 04/2016), TIA @ 2006, urinary retention, iron deficiency anemia presenting with fever and chills since 6am. Admits to dysuria, increase urge to urinate, hematuria, nonradiating dull lower back pain. Denies SOB, CP, cough, sore throat, N/V/D, or BRBPR. No sick contacts. No travel.    In the ED, febrile 102.8, , /73, sat 94% RA, RR 16, WBC 18.6, H/H 13.8/43.1, plat 252, Bands 9.0, Coags PT 12.3, INR 1.13, PTT 28, BUN 16, Cr 0.75, Glu 110, UA positive nitrites, trace leuk, mod blood, wbc 11-25, rbc 6-10, many bacteria, CXR There are prominent bronchovascular markings at the left lower lung zone.No lobar lung consolidation significant pleural effusion or pneumothorax is noted. EKG sinus tachy @119BPM. Received IV azactam, vanco, tylenol 72 y/o M PMHx of HLD, A-fib, MG (diagnosed @ 04/2016), TIA @ 2006, urinary retention, iron deficiency anemia presenting with fever and chills since 6am. Admits to dysuria, increase urge to urinate, hematuria, nonradiating dull lower back pain. Patient was seen at [place] ED on 11/17/17 for left sided back pain. Denies SOB, CP, cough, sore throat, N/V/D, or BRBPR. No sick contacts. No travel.    In the ED, febrile 102.8, , /73, sat 94% RA, RR 16, WBC 18.6, H/H 13.8/43.1, plat 252, Bands 9.0, Coags PT 12.3, INR 1.13, PTT 28, BUN 16, Cr 0.75, Glu 110, UA positive nitrites, trace leuk, mod blood, wbc 11-25, rbc 6-10, many bacteria, CXR There are prominent bronchovascular markings at the left lower lung zone.No lobar lung consolidation significant pleural effusion or pneumothorax is noted. EKG sinus tachy @119BPM. Received IV azactam, vanco, tylenol 70 y/o M PMHx of HLD, A-fib, MG (4/2016) on prednisone, TIA (2006), urinary retention, iron deficiency anemia presenting with fever and chills since 6am. Patient states he has Admits to dysuria, increase urge to urinate, hematuria, nonradiating dull lower back pain. Patient was seen at [place] ED on 11/17/17 for left sided back pain. Denies SOB, CP, cough, sore throat, N/V/D, or BRBPR. No sick contacts. No travel.    In the ED, febrile 102.8, , /73, sat 94% RA, RR 16, WBC 18.6, H/H 13.8/43.1, plat 252, Bands 9.0, Coags PT 12.3, INR 1.13, PTT 28, BUN 16, Cr 0.75, Glu 110, UA positive nitrites, trace leuk, mod blood, wbc 11-25, rbc 6-10, many bacteria, CXR There are prominent bronchovascular markings at the left lower lung zone.No lobar lung consolidation significant pleural effusion or pneumothorax is noted. EKG sinus tachy @119BPM. Received IV azactam, vanco, tylenol 72 y/o M PMHx of HLD, A-fib, MG (4/2016) on prednisone, TIA (2006), urinary retention, glaucoma, herpes virus of right eye, iron deficiency anemia presenting with fever and chills since 6am. Patient states he has Admits to dysuria, increase urge to urinate, hematuria, nonradiating dull lower back pain. Patient was seen at [place] ED on 11/17/17 for left sided back pain. Denies SOB, CP, cough, sore throat, N/V/D, or BRBPR. No sick contacts. No travel.    In the ED, febrile 102.8, , /73, sat 94% RA, RR 16, WBC 18.6, H/H 13.8/43.1, plat 252, Bands 9.0, Coags PT 12.3, INR 1.13, PTT 28, BUN 16, Cr 0.75, Glu 110, UA positive nitrites, trace leuk, mod blood, wbc 11-25, rbc 6-10, many bacteria, CXR There are prominent bronchovascular markings at the left lower lung zone.No lobar lung consolidation significant pleural effusion or pneumothorax is noted. EKG sinus tachy @119BPM. Received IV azactam, vanco, tylenol 72 y/o M PMHx of HLD, A-fib, MG (4/2016) on prednisone, TIA (2006), urinary retention, sleep apnea no CPAP, glaucoma, herpes virus of right eye, iron deficiency anemia presenting with fever and chills since 6am. Patient reports dysuria, increase urge to urinate, hematuria, nonradiating dull lower back pain(different than recent back pain) x2 days. Patient was seen by Dr. Geronimo on 11/17/17 in the office for left sided back pain. Ultrasound and UA were both unremarkable. Continued the flomax and was sent to [place] ED on 11/17/17 for CT lumbar spine that showed degenerative changes. No trauma. Discharged home on tylenol and lidocaine patch. Denies SOB, CP, cough, sore throat, N/V/D, or BRBPR. No sick contacts. No travel.    In the ED, febrile 102.8, , /73, sat 94% RA, RR 16, WBC 18.6, H/H 13.8/43.1, plat 252, Bands 9.0, Coags PT 12.3, INR 1.13, PTT 28, BUN 16, Cr 0.75, Glu 110, UA positive nitrites, trace leuk, mod blood, wbc 11-25, rbc 6-10, many bacteria, CXR There are prominent bronchovascular markings at the left lower lung zone.No lobar lung consolidation significant pleural effusion or pneumothorax is noted. EKG sinus tachy @119BPM. Received IV azactam, vanco, tylenol

## 2017-12-03 NOTE — H&P ADULT - ASSESSMENT
70 y/o M PMHx of HLBRUNA, Lauar MG (diagnosed @ 04/2016), TIA @ 2006, iron deficiency anemia admitted with sepsis likely 2/2 UTI 70 y/o M PMHx of HLD, A-fib, MG (4/2016) on prednisone, TIA (2006), urinary retention, glaucoma, herpes virus of right eye, anxiety, iron deficiency anemia admitted with sepsis likely 2/2 UTI. 72 y/o M PMHx of HLD, A-fib, MG (4/2016) on prednisone, TIA (2006), urinary retention, sleep apnea no CPAP, glaucoma, herpes virus of right eye, anxiety, iron deficiency anemia admitted with sepsis likely 2/2 UTI.

## 2017-12-03 NOTE — H&P ADULT - NSHPSOCIALHISTORY_GEN_ALL_CORE
former smoker quit 40 years ago (15year history)  denies etoh or illicit drugs  lives with wife at home   Flu shot/PNA vaccine uptodate 2017 former smoker quit 40 years ago (15year history)  denies etoh or illicit drugs  lives with wife at home   Flu shot/PNA vaccine uptodate 2017  ambulates with cane

## 2017-12-03 NOTE — H&P ADULT - PMH
Atrial fibrillation, unspecified type    Benign prostatic hyperplasia, presence of lower urinary tract symptoms unspecified, unspecified morphology    GERD (gastroesophageal reflux disease)    HLD (hyperlipidemia)    Iron deficiency anemia    Myasthenia gravis    Nasal polyp    TIA (transient ischemic attack)  2006 Atrial fibrillation, unspecified type    Benign prostatic hyperplasia, presence of lower urinary tract symptoms unspecified, unspecified morphology    GERD (gastroesophageal reflux disease)    Glaucoma    Herpes virus disease  right eye  HLD (hyperlipidemia)    Iron deficiency anemia    Myasthenia gravis    Nasal polyp    Osteopenia    Sleep apnea  wear nasal strips  TIA (transient ischemic attack)  2006  Urinary retention

## 2017-12-03 NOTE — H&P ADULT - PROBLEM SELECTOR PLAN 5
Stable - Continue with Stable - Continue with latanoprost drops likely 2/2 BPH? wife denies h/o BPH  Retaining - Continue with flomax  coker catheter placed  F/U Dr. Geronimo

## 2017-12-03 NOTE — H&P ADULT - PROBLEM SELECTOR PLAN 1
Admit to   Continue with   F/U blood cx, urine cx Admit to F  trend bands  Continue with   F/U blood cx, urine cx, cbc Admit to GMF  trend bands  Continue with azactam  s/p vanco  F/U blood cx, urine cx, cbc Admit to GMF  trend bands  Continue with Vanco, monitor Vanco trough, renal function   S/P 1 dose of aztreonam in ED.  F/U blood cx, urine cx, cbc  F/U Lactate. Admit to GMF  Bandemia - trend   Continue with Vanco, monitor Vanco trough, renal function   S/P 1 dose of aztreonam in ED.  F/U blood cx, urine cx, cbc, bmp  Tylenol prn fever  Dr. Quick (ID) Admit to GMF  Bandemia - trend   Continue with Vanco, monitor Vanco trough, renal function   S/P 1 dose of aztreonam in ED.  F/U blood cx, urine cx, cbc, bmp  Tylenol prn fever  RVP negative   Dr. Quick (ID)

## 2017-12-03 NOTE — H&P ADULT - PROBLEM SELECTOR PLAN 10
DVT PPX - on pradaxa  fall risk, ambulate with assistance, OOB with assistance  Diet Dash/TLC  Sleep apnea - No CPAP - wears nasal strip/cones at night  Continue with fish oil, vit c, vit d, calcium, multivitamin DVT PPX - on pradaxa  fall risk, ambulate with assistance, OOB with assistance  Diet Dash/TLC  Sleep apnea - No CPAP - wears nasal strip/cones at night  Continue with fish oil, vit c, vit d, calcium, multivitamin  bowel regimen

## 2017-12-03 NOTE — H&P ADULT - NSHPREVIEWOFSYSTEMS_GEN_ALL_CORE
Review of Systems: CONSTITUTIONAL: (+) fever/chills. NO reported chronic weakness  EYES/ENT: No visual changes;  No vertigo or throat pain   NECK: No pain or stiffness  RESPIRATORY: No cough, wheezing, hemoptysis; No shortness of breath  CARDIOVASCULAR: No chest pain or palpitations  GASTROINTESTINAL: No abdominal or epigastric pain. No nausea, vomiting, or hematemesis; No diarrhea or constipation. No melena or hematochezia.  GENITOURINARY: No dysuria, frequency or hematuria  NEUROLOGICAL: No numbness or weakness  SKIN: No itching, burning, rashes, or lesions   All other review of systems is negative unless indicated above. Review of Systems: CONSTITUTIONAL: (+) fever/chills/weakness  EYES/ENT: No visual changes;  No vertigo or throat pain   NECK: No pain or stiffness  RESPIRATORY: No cough, wheezing, hemoptysis; No shortness of breath  CARDIOVASCULAR: No chest pain or palpitations  GASTROINTESTINAL: No abdominal or epigastric pain. No nausea, vomiting, or hematemesis; No diarrhea or constipation. No melena or hematochezia.  GENITOURINARY: (+) dysuria, hematuria, urinary retention  NEUROLOGICAL: No numbness or weakness  SKIN: (+) chronic facial rash. No itching, burning, or lesions   All other review of systems is negative unless indicated above.

## 2017-12-03 NOTE — ED ADULT NURSE REASSESSMENT NOTE - NS ED NURSE REASSESS COMMENT FT1
Pt voided 400ml to urinal, then bladder scan done which showed almost 600ml still in bladder; spoke with Dr. Faustin, she requested coker catheter placed and will consult urology.

## 2017-12-03 NOTE — ED ADULT NURSE NOTE - OBJECTIVE STATEMENT
Pt A&Ox4, to ED via ambulance c/o fever.  Pt states he woke up today with chills and body aches, developed high fever.  Pt denies cough, sore throat or any other complaints.

## 2017-12-03 NOTE — H&P ADULT - PROBLEM SELECTOR PLAN 6
DVT PPX  fall risk, ambulate with assistance, OOB with assistance  Diet Dash/TLC Right eye  Patient needs to send Zirgan to pharmacy Stable - Continue with latanoprost drops

## 2017-12-03 NOTE — CONSULT NOTE ADULT - SUBJECTIVE AND OBJECTIVE BOX
HPI:  70 y/o M PMHx of HLD, A-fib, MG (2016) on prednisone, TIA (), urinary retention, sleep apnea no CPAP, glaucoma, herpes virus of right eye, iron deficiency anemia presenting with fever and chills since 6am. Patient reports dysuria, increase urge to urinate, hematuria, nonradiating dull lower back pain(different than recent back pain) x2 days. Patient was seen by Dr. Geronimo on 17 in the office for left sided back pain. Ultrasound and UA were both unremarkable. Continued the flomax and was sent to [place] ED on 17 for CT lumbar spine that showed degenerative changes. No trauma. Discharged home on tylenol and lidocaine patch. Denies SOB, CP, cough, sore throat, N/V/D, or BRBPR. No sick contacts. No travel.    In the ED, febrile 102.8, , /73, sat 94% RA, RR 16, WBC 18.6, H/H 13.8/43.1, plat 252, Bands 9.0, Coags PT 12.3, INR 1.13, PTT 28, BUN 16, Cr 0.75, Glu 110, UA positive nitrites, trace leuk, mod blood, wbc 11-25, rbc 6-10, many bacteria, CXR There are prominent bronchovascular markings at the left lower lung zone.No lobar lung consolidation significant pleural effusion or pneumothorax is noted. EKG sinus tachy @119BPM. Received IV azactam, vanco, tylenol (03 Dec 2017 13:09)    With regard to pcn intolerance, patient reports that when he was very young he had swelling localized around the area where he received a pcn injection.      PAST MEDICAL & SURGICAL HISTORY:  Herpes virus disease: right eye  Sleep apnea: wear nasal strips  Glaucoma  Urinary retention  Osteopenia  Benign prostatic hyperplasia, presence of lower urinary tract symptoms unspecified, unspecified morphology  Nasal polyp  Iron deficiency anemia  Atrial fibrillation, unspecified type  Myasthenia gravis  TIA (transient ischemic attack):   GERD (gastroesophageal reflux disease)  HLD (hyperlipidemia)  H/O coronary angiogram  H/O nasal polypectomy  Other cataract: right      Antimicrobials      Immunological  mycophenolate mofetil 1000 milliGRAM(s) Oral two times a day      Other  acetaminophen   Tablet 650 milliGRAM(s) Oral every 6 hours PRN  ascorbic acid 1000 milliGRAM(s) Oral daily  calcium carbonate  625 mG + Vitamin D (OsCal 250 + D) 1 Tablet(s) Oral two times a day  cholecalciferol 2000 Unit(s) Oral daily  dabigatran 150 milliGRAM(s) Oral every 12 hours  diltiazem    milliGRAM(s) Oral <User Schedule>  docusate sodium 100 milliGRAM(s) Oral at bedtime  ferrous    sulfate 325 milliGRAM(s) Oral daily  latanoprost 0.005% Ophthalmic Solution 1 Drop(s) Both EYES at bedtime  mirtazapine 7.5 milliGRAM(s) Oral at bedtime  multivitamin/minerals 1 Tablet(s) Oral daily  omega-3-Acid Ethyl Esters 2 Gram(s) Oral two times a day  pyridostigmine 60 milliGRAM(s) Oral <User Schedule>  pyridostigmine  milliGRAM(s) Oral at bedtime  senna 1 Tablet(s) Oral at bedtime      Allergies    immune globulin intravenous (Unknown)  penicillins localized swelling    Intolerances        SOCIAL HISTORY: no current tobacco    FAMILY HISTORY:  Family history of anemia (Sibling)      ROS:    EYES:  Negative  blurry vision or double vision  GASTROINTESTINAL:  Negative for nausea, vomiting, diarrhea  -otherwise negative except for subjective    Vital Signs Last 24 Hrs  T(C): 37.3 (03 Dec 2017 14:00), Max: 39.3 (03 Dec 2017 11:42)  T(F): 99.2 (03 Dec 2017 14:00), Max: 102.8 (03 Dec 2017 11:42)  HR: 84 (03 Dec 2017 16:10) (84 - 125)  BP: 124/55 (03 Dec 2017 16:10) (124/55 - 170/91)  BP(mean): --  RR: 17 (03 Dec 2017 16:10) (16 - 17)  SpO2: 97% (03 Dec 2017 16:10) (94% - 98%)    PE:  WDWN in no distress  HEENT:  NC, PERRL, sclerae anicteric, conjunctivae clear, EOMI.  Sinuses nontender, no nasal exudate.  No buccal or pharyngeal lesions, erythema or exudate  Neck:  Supple, no adenopathy  Lungs:  No accessory muscle use, bilaterally clear to auscultation  Cor:  RRR, S1, S2, no murmur appreciated  Abd:  Symmetric, normoactive BS.  Soft, mild suprapubic tenderness, no masses, guarding or rebound.  Liver and spleen not enlarged, Left CVA tenderness   Extrem:  No cyanosis or edema  Skin:  No rashes.  Neuro: grossly intact  Musc: moving all limbs freely, no focal deficits    LABS:                        13.8   18.6  )-----------( 252      ( 03 Dec 2017 12:15 )             43.1         139  |  105  |  16  ----------------------------<  110<H>  3.7   |  25  |  0.75    Ca    9.1      03 Dec 2017 12:15    TPro  6.2  /  Alb  3.4  /  TBili  0.6  /  DBili  x   /  AST  18  /  ALT  23  /  AlkPhos  87  12    Urinalysis Basic - ( 03 Dec 2017 12:46 )    Color: Yellow / Appearance: Clear / S.015 / pH: x  Gluc: x / Ketone: Negative  / Bili: Negative / Urobili: Negative   Blood: x / Protein: Negative / Nitrite: Positive   Leuk Esterase: Trace / RBC: 6-10 /HPF / WBC 11-25   Sq Epi: x / Non Sq Epi: Few / Bacteria: Many        MICROBIOLOGY:      RADIOLOGY & ADDITIONAL STUDIES:    --< from: Xray Chest 1 View AP/PA (17 @ 12:46) >    EXAM:  CHEST 1 VIEW                            PROCEDURE DATE:  2017          INTERPRETATION:  Chest portable.    Clinical History: Sepsis.    Comparison: 2016.    Single AP view submitted. Right Mhqprp-g-Ctur catheter is present, tip at   the level of the superior vena cava.    The evaluation of the cardiomediastinal silhouette is limited on portable   technique.    There are prominent bronchovascular markings at the left lower lung zone.  No lobar lung consolidation significant pleural effusion or pneumothorax   is noted.

## 2017-12-03 NOTE — PATIENT PROFILE ADULT. - SPECIALTY PHYSICIANS/OTHER PROVIDERS, PROFILE
Dr Kp Garcia Cardio , [facility] Dr Fernando, Uro Dr Geronimo Dr Kp Garcia Cardio , [place] - Dr Fernando, Urology - Dr Geronimo

## 2017-12-03 NOTE — CONSULT NOTE ADULT - ASSESSMENT
72 yo male with history of myasthenia gravis who presents with abrupt onset of fever and shaking chills this am along with urinary retention.

## 2017-12-03 NOTE — H&P ADULT - ATTENDING COMMENTS
70 y/o M PMHx of HLD, A-fib, MG (4/2016) on prednisone, TIA (2006), urinary retention, glaucoma, herpes virus of right eye, anxiety, iron deficiency anemia admitted with sepsis likely 2/2 UTI and urinary retention. Titus catheter placed. F/U , ID, Neuro consults. Plan as above discussed with PGY2 at length 70 y/o M PMHx of HLD, A-fib, MG (4/2016) on prednisone, TIA (2006), urinary retention, glaucoma, herpes virus of right eye, anxiety, iron deficiency anemia admitted with sepsis likely 2/2 UTI. F/U , ID, Neuro consults. Patient was having problem urinating earlier that improved later. Patient on Flomax. Will monitor closely for now. Patient urinating at present per RN.  Dr. Estes ( Patient's  as out patient was called).  Plan as above discussed with PGY2 at length

## 2017-12-03 NOTE — CONSULT NOTE ADULT - PROBLEM SELECTOR RECOMMENDATION 2
Fever, chills, and likely urinary source suggest this as most likely acute process driving this presentation

## 2017-12-03 NOTE — H&P ADULT - PROBLEM SELECTOR PLAN 2
Stable - continue with Controlled - continue with cardizem (hold parameters) and pradaxa F/U urine cx  coker catheter placed  Continue with vanco   s/p azactam  Consider CT abd/pelvis - patient had CT lumbar spine 11/17/17 showed degenerative changes  Dr. Geronimo consulted

## 2017-12-04 LAB
ANION GAP SERPL CALC-SCNC: 4 MMOL/L — LOW (ref 5–17)
BASOPHILS # BLD AUTO: 0 K/UL — SIGNIFICANT CHANGE UP (ref 0–0.2)
BASOPHILS NFR BLD AUTO: 0.2 % — SIGNIFICANT CHANGE UP (ref 0–2)
BUN SERPL-MCNC: 16 MG/DL — SIGNIFICANT CHANGE UP (ref 7–23)
CALCIUM SERPL-MCNC: 8.9 MG/DL — SIGNIFICANT CHANGE UP (ref 8.5–10.1)
CHLORIDE SERPL-SCNC: 110 MMOL/L — HIGH (ref 96–108)
CO2 SERPL-SCNC: 31 MMOL/L — SIGNIFICANT CHANGE UP (ref 22–31)
CREAT SERPL-MCNC: 0.64 MG/DL — SIGNIFICANT CHANGE UP (ref 0.5–1.3)
E COLI DNA BLD POS QL NAA+NON-PROBE: SIGNIFICANT CHANGE UP
EOSINOPHIL # BLD AUTO: 0 K/UL — SIGNIFICANT CHANGE UP (ref 0–0.5)
EOSINOPHIL NFR BLD AUTO: 0 % — SIGNIFICANT CHANGE UP (ref 0–6)
GLUCOSE SERPL-MCNC: 113 MG/DL — HIGH (ref 70–99)
GRAM STN FLD: SIGNIFICANT CHANGE UP
HCT VFR BLD CALC: 39.4 % — SIGNIFICANT CHANGE UP (ref 39–50)
HGB BLD-MCNC: 12.6 G/DL — LOW (ref 13–17)
LYMPHOCYTES # BLD AUTO: 0.5 K/UL — LOW (ref 1–3.3)
LYMPHOCYTES # BLD AUTO: 2.4 % — LOW (ref 13–44)
MCHC RBC-ENTMCNC: 30.8 PG — SIGNIFICANT CHANGE UP (ref 27–34)
MCHC RBC-ENTMCNC: 31.9 GM/DL — LOW (ref 32–36)
MCV RBC AUTO: 96.6 FL — SIGNIFICANT CHANGE UP (ref 80–100)
METHOD TYPE: SIGNIFICANT CHANGE UP
MONOCYTES # BLD AUTO: 0.8 K/UL — SIGNIFICANT CHANGE UP (ref 0–0.9)
MONOCYTES NFR BLD AUTO: 3.5 % — SIGNIFICANT CHANGE UP (ref 1–9)
NEUTROPHILS # BLD AUTO: 20.3 K/UL — HIGH (ref 1.8–7.4)
NEUTROPHILS NFR BLD AUTO: 93.9 % — HIGH (ref 43–77)
PLATELET # BLD AUTO: 218 K/UL — SIGNIFICANT CHANGE UP (ref 150–400)
POTASSIUM SERPL-MCNC: 4 MMOL/L — SIGNIFICANT CHANGE UP (ref 3.5–5.3)
POTASSIUM SERPL-SCNC: 4 MMOL/L — SIGNIFICANT CHANGE UP (ref 3.5–5.3)
RBC # BLD: 4.08 M/UL — LOW (ref 4.2–5.8)
RBC # FLD: 14 % — SIGNIFICANT CHANGE UP (ref 10.3–14.5)
SODIUM SERPL-SCNC: 145 MMOL/L — SIGNIFICANT CHANGE UP (ref 135–145)
SPECIMEN SOURCE: SIGNIFICANT CHANGE UP
SPECIMEN SOURCE: SIGNIFICANT CHANGE UP
WBC # BLD: 21.6 K/UL — HIGH (ref 3.8–10.5)
WBC # FLD AUTO: 21.6 K/UL — HIGH (ref 3.8–10.5)

## 2017-12-04 PROCEDURE — 99233 SBSQ HOSP IP/OBS HIGH 50: CPT | Mod: GC

## 2017-12-04 RX ORDER — GANCICLOVIR 4.5 MG
1 IMPLANT (EA) INTRAOCULAR DAILY
Qty: 0 | Refills: 0 | Status: DISCONTINUED | OUTPATIENT
Start: 2017-12-04 | End: 2017-12-06

## 2017-12-04 RX ADMIN — MYCOPHENOLATE MOFETIL 1000 MILLIGRAM(S): 250 CAPSULE ORAL at 18:52

## 2017-12-04 RX ADMIN — MIRTAZAPINE 7.5 MILLIGRAM(S): 45 TABLET, ORALLY DISINTEGRATING ORAL at 22:01

## 2017-12-04 RX ADMIN — Medication 2 GRAM(S): at 18:52

## 2017-12-04 RX ADMIN — PYRIDOSTIGMINE BROMIDE 60 MILLIGRAM(S): 60 SOLUTION ORAL at 14:02

## 2017-12-04 RX ADMIN — PYRIDOSTIGMINE BROMIDE 60 MILLIGRAM(S): 60 SOLUTION ORAL at 06:56

## 2017-12-04 RX ADMIN — MYCOPHENOLATE MOFETIL 1000 MILLIGRAM(S): 250 CAPSULE ORAL at 13:01

## 2017-12-04 RX ADMIN — Medication 100 MILLIGRAM(S): at 22:01

## 2017-12-04 RX ADMIN — Medication 1 TABLET(S): at 06:56

## 2017-12-04 RX ADMIN — DABIGATRAN ETEXILATE MESYLATE 150 MILLIGRAM(S): 150 CAPSULE ORAL at 18:52

## 2017-12-04 RX ADMIN — SENNA PLUS 1 TABLET(S): 8.6 TABLET ORAL at 22:01

## 2017-12-04 RX ADMIN — PYRIDOSTIGMINE BROMIDE 180 MILLIGRAM(S): 60 SOLUTION ORAL at 22:01

## 2017-12-04 RX ADMIN — PYRIDOSTIGMINE BROMIDE 60 MILLIGRAM(S): 60 SOLUTION ORAL at 10:13

## 2017-12-04 RX ADMIN — Medication 40 MILLIGRAM(S): at 06:56

## 2017-12-04 RX ADMIN — PYRIDOSTIGMINE BROMIDE 60 MILLIGRAM(S): 60 SOLUTION ORAL at 18:53

## 2017-12-04 RX ADMIN — Medication 1000 MILLIGRAM(S): at 13:03

## 2017-12-04 RX ADMIN — Medication 325 MILLIGRAM(S): at 12:59

## 2017-12-04 RX ADMIN — LATANOPROST 1 DROP(S): 0.05 SOLUTION/ DROPS OPHTHALMIC; TOPICAL at 22:01

## 2017-12-04 RX ADMIN — Medication 1 TABLET(S): at 12:59

## 2017-12-04 RX ADMIN — DABIGATRAN ETEXILATE MESYLATE 150 MILLIGRAM(S): 150 CAPSULE ORAL at 06:55

## 2017-12-04 RX ADMIN — Medication 1 TABLET(S): at 18:53

## 2017-12-04 RX ADMIN — Medication 2000 UNIT(S): at 13:03

## 2017-12-04 RX ADMIN — Medication 180 MILLIGRAM(S): at 06:55

## 2017-12-04 RX ADMIN — Medication 2 GRAM(S): at 06:56

## 2017-12-04 RX ADMIN — CEFTRIAXONE 100 GRAM(S): 500 INJECTION, POWDER, FOR SOLUTION INTRAMUSCULAR; INTRAVENOUS at 18:52

## 2017-12-04 RX ADMIN — TAMSULOSIN HYDROCHLORIDE 0.4 MILLIGRAM(S): 0.4 CAPSULE ORAL at 22:01

## 2017-12-04 NOTE — PROGRESS NOTE ADULT - PROBLEM SELECTOR PLAN 10
DVT PPX - on pradaxa  fall risk, ambulate with assistance, OOB with assistance  Sleep apnea - No CPAP - wears nasal strip/cones at night

## 2017-12-04 NOTE — PROGRESS NOTE ADULT - PROBLEM SELECTOR PLAN 4
continue mestinone/steroid and cellcept.  Stress dose IV solumedrol x1 in ED  OOB to chair, ambulate with assistance  follow up PT recs  follow neuro, Dr. Fernando, recs continue mestinon/steroid and cellcept.  Stress dose IV solumedrol x1 in ED  OOB to chair, ambulate with assistance  follow up PT recs  follow neuro, Dr. Fernando, recs

## 2017-12-04 NOTE — PROGRESS NOTE ADULT - SUBJECTIVE AND OBJECTIVE BOX
Patient is a 72y old  Male who presents with a chief complaint of Temperature 102.8, chills, backpain (03 Dec 2017 20:43)      INTERVAL HPI/OVERNIGHT EVENTS:    72 y/o M PMHx of HLD, A-fib, MG (2016) on prednisone, TIA (), urinary retention, sleep apnea no CPAP, glaucoma, herpes virus of right eye, iron deficiency anemia presenting with fever and chills since 6am. Patient reports dysuria, increase urge to urinate, hematuria, nonradiating dull lower back pain(different than recent back pain) x2 days. Patient was seen by Dr. Geronimo on 17 in the office for left sided back pain. Ultrasound and UA were both unremarkable. Continued the flomax and was sent to [facility] ED on 17 for CT lumbar spine that showed degenerative changes. No trauma. Discharged home on tylenol and lidocaine patch. Denies SOB, CP, cough, sore throat, N/V/D, or BRBPR. No sick contacts. No travel.    In the ED, febrile 102.8, , /73, sat 94% RA, RR 16, WBC 18.6, H/H 13.8/43.1, plat 252, Bands 9.0, Coags PT 12.3, INR 1.13, PTT 28, BUN 16, Cr 0.75, Glu 110, UA positive nitrites, trace leuk, mod blood, wbc 11-25, rbc 6-10, many bacteria, CXR There are prominent bronchovascular markings at the left lower lung zone.No lobar lung consolidation significant pleural effusion or pneumothorax is noted. EKG sinus tachy @119BPM. Received IV azactam, vanco, tylenol    Overnight events: Patient seen at bedside. Reports improved dysuria, frequency, lower back pain.  MEDICATIONS  (STANDING):  ascorbic acid 1000 milliGRAM(s) Oral daily  calcium carbonate  625 mG + Vitamin D (OsCal 250 + D) 1 Tablet(s) Oral two times a day  cefTRIAXone   IVPB 1 Gram(s) IV Intermittent every 24 hours  cefTRIAXone   IVPB      cholecalciferol 2000 Unit(s) Oral daily  dabigatran 150 milliGRAM(s) Oral every 12 hours  diltiazem    milliGRAM(s) Oral <User Schedule>  docusate sodium 100 milliGRAM(s) Oral at bedtime  ferrous    sulfate 325 milliGRAM(s) Oral daily  ganciclovir 0.15% Ophthalmic Gel 1 Drop(s) Right EYE daily  latanoprost 0.005% Ophthalmic Solution 1 Drop(s) Both EYES at bedtime  mirtazapine 7.5 milliGRAM(s) Oral at bedtime  multivitamin/minerals 1 Tablet(s) Oral daily  mycophenolate mofetil 1000 milliGRAM(s) Oral two times a day  omega-3-Acid Ethyl Esters 2 Gram(s) Oral two times a day  predniSONE   Tablet 40 milliGRAM(s) Oral daily  pyridostigmine 60 milliGRAM(s) Oral <User Schedule>  pyridostigmine  milliGRAM(s) Oral at bedtime  senna 1 Tablet(s) Oral at bedtime  tamsulosin 0.4 milliGRAM(s) Oral at bedtime    MEDICATIONS  (PRN):  acetaminophen   Tablet 650 milliGRAM(s) Oral every 6 hours PRN For Temp greater than 38 C (100.4 F)      Allergies    immune globulin intravenous (Unknown)  penicillins (Rash)    Intolerances        REVIEW OF SYSTEMS:  CONSTITUTIONAL: No fever, or fatigue  RESPIRATORY: No cough, wheezing, chills or hemoptysis; No shortness of breath  CARDIOVASCULAR: No chest pain, palpitations, dizziness, or leg swelling  GASTROINTESTINAL: Improved suprapubic pain. No nausea, vomiting, or hematemesis; No diarrhea or constipation. No melena or hematochezia.  GENITOURINARY: Improved dysuria, frequency. No incontinence  NEUROLOGICAL: No headaches, memory loss, loss of strength, numbness, or tremors  SKIN: No itching, burning, rashes, or lesions   MUSCULOSKELETAL: No joint pain or swelling; No muscle, back, or extremity pain  PSYCHIATRIC: No depression, anxiety, mood swings, or difficulty sleeping    Vital Signs Last 24 Hrs  T(C): 37.1 (04 Dec 2017 14:04), Max: 37.1 (04 Dec 2017 14:04)  T(F): 98.7 (04 Dec 2017 14:04), Max: 98.7 (04 Dec 2017 14:04)  HR: 80 (04 Dec 2017 14:04) (66 - 84)  BP: 129/64 (04 Dec 2017 14:04) (122/69 - 132/60)  BP(mean): --  RR: 17 (04 Dec 2017 14:04) (17 - 18)  SpO2: 98% (04 Dec 2017 14:04) (96% - 100%)    PHYSICAL EXAM:  GENERAL: NAD, well-groomed, well-developed  HEAD:  Atraumatic, Normocephalic  EYES: EOMI, PERRLA, conjunctiva and sclera clear  ENMT: No tonsillar erythema, exudates, or enlargement; Moist mucous membranes,   NECK: Supple, No JVD, Normal thyroid  NERVOUS SYSTEM:  Alert & Oriented X3, Good concentration; Motor Strength 5/5 B/L upper and lower extremities  CHEST/LUNG: Clear to auscultation bilaterally; No rales, rhonchi, wheezing, or rubs  HEART: Regular rate and rhythm; No murmurs, rubs, or gallops  ABDOMEN: Soft, Nontender, Nondistended; Bowel sounds present  EXTREMITIES:  2+ Peripheral Pulses, No clubbing, cyanosis, or edema  LYMPH: No lymphadenopathy noted  SKIN: No rashes or lesions    LABS:                        12.6   21.6  )-----------( 218      ( 04 Dec 2017 06:55 )             39.4     04 Dec 2017 06:55    145    |  110    |  16     ----------------------------<  113    4.0     |  31     |  0.64     Ca    8.9        04 Dec 2017 06:55      PT/INR - ( 03 Dec 2017 12:15 )   PT: 12.3 sec;   INR: 1.13 ratio         PTT - ( 03 Dec 2017 12:15 )  PTT:28.0 sec  Urinalysis Basic - ( 03 Dec 2017 12:46 )    Color: Yellow / Appearance: Clear / S.015 / pH: x  Gluc: x / Ketone: Negative  / Bili: Negative / Urobili: Negative   Blood: x / Protein: Negative / Nitrite: Positive   Leuk Esterase: Trace / RBC: 6-10 /HPF / WBC 11-25   Sq Epi: x / Non Sq Epi: Few / Bacteria: Many      CAPILLARY BLOOD GLUCOSE          RADIOLOGY & ADDITIONAL TESTS:    EXAM:  CHEST 1 VIEW                            PROCEDURE DATE:  2017          INTERPRETATION:  Chest portable.    Clinical History: Sepsis.    Comparison: 2016.    Single AP view submitted. Right [id number] catheter is present, tip at   the level of the superior vena cava.    The evaluation of the cardiomediastinal silhouette is limited on portable   technique.    There are prominent bronchovascular markings at the left lower lung zone.  No lobar lung consolidation significant pleural effusion or pneumothorax   is noted.    Impression:    Findings as discussed above.

## 2017-12-04 NOTE — PROGRESS NOTE ADULT - SUBJECTIVE AND OBJECTIVE BOX
Neurology Follow up note    SAL SUTTON72yMale    HPI:  72 y/o M PMHx of HLD, A-fib, MG (2016) on prednisone, TIA (), urinary retention, sleep apnea no CPAP, glaucoma, herpes virus of right eye, iron deficiency anemia presenting with fever and chills since 6am. Patient reports dysuria, increase urge to urinate, hematuria, nonradiating dull lower back pain(different than recent back pain) x2 days. Patient was seen by Dr. Geronimo on 17 in the office for left sided back pain. Ultrasound and UA were both unremarkable. Continued the flomax and was sent to [facility] ED on 17 for CT lumbar spine that showed degenerative changes. No trauma. Discharged home on tylenol and lidocaine patch. Denies SOB, CP, cough, sore throat, N/V/D, or BRBPR. No sick contacts. No travel.    In the ED, febrile 102.8, , /73, sat 94% RA, RR 16, WBC 18.6, H/H 13.8/43.1, plat 252, Bands 9.0, Coags PT 12.3, INR 1.13, PTT 28, BUN 16, Cr 0.75, Glu 110, UA positive nitrites, trace leuk, mod blood, wbc 11-25, rbc 6-10, many bacteria, CXR There are prominent bronchovascular markings at the left lower lung zone.No lobar lung consolidation significant pleural effusion or pneumothorax is noted. EKG sinus tachy @119BPM. Received IV azactam, vanco, tylenol (03 Dec 2017 13:09)      Interval History - no new events.    Patient is seen, chart was reviewed and case was discussed with the treatment team.  Pt is not in any distress.   Lying on bed comfortably.   No events reported overnight.   No clinical seizure was reported.  Sitting on chair bed comfortably.    is at bedside.    Vital Signs Last 24 Hrs  T(C): 36.8 (04 Dec 2017 06:01), Max: 37.3 (03 Dec 2017 14:00)  T(F): 98.2 (04 Dec 2017 06:01), Max: 99.2 (03 Dec 2017 14:00)  HR: 79 (04 Dec 2017 06:01) (66 - 101)  BP: 122/69 (04 Dec 2017 06:01) (122/69 - 157/64)  BP(mean): --  RR: 18 (04 Dec 2017 06:01) (16 - 18)  SpO2: 100% (04 Dec 2017 06:01) (96% - 100%)        REVIEW OF SYSTEMS:    Constitutional: No fever, weight loss or fatigue  Eyes: No eye pain, visual disturbances, or discharge  ENT:  No difficulty hearing, tinnitus, vertigo; No sinus or throat pain  Neck: No pain or stiffness  Respiratory: No cough, wheezing, chills or hemoptysis  Cardiovascular: No chest pain, palpitations, shortness of breath, dizziness or leg swelling  Gastrointestinal: No abdominal or epigastric pain. No nausea, vomiting or hematemesis; No diarrhea or constipation. No melena or hematochezia.  Genitourinary: No dysuria, frequency, hematuria or incontinence  Neurological: No headaches, memory loss, loss of strength, numbness or tremors  Psychiatric: No depression, anxiety, mood swings or difficulty sleeping  Musculoskeletal: No joint pain or swelling; No muscle, back or extremity pain  Skin: No itching, burning, rashes or lesions   Lymph Nodes: No enlarged glands  Endocrine: No heat or cold intolerance; No hair loss, No h/o diabetes or thyroid dysfunction  Allergy and Immunologic: No hives or eczema    On Neurological Examination:    Mental Status - Pt is alert, awake, oriented X3. Higher functions are intact. Follows commands well and able to answer questions appropriately .Mood and affect  normal    Speech -  Normal.     Cranial Nerves - Pupils 3 mm equal and reactive to light, extraocular eye movements intact. Pt has no visual field deficit.  Pt has no facial asymmetry. Facial sensation is intact.Tongue - is in midline.    Muscle tone - is normal all over. Moves all extremities equally. No asymmetry is seen.      Motor Exam - 5/5 except shoulder and hip 4/5,    Sensory Exam - Pin prick, temperature, joint position and vibration are intact on either side. Pt withdraws all extremities equally on stimulation. No asymmetry seen. No complaints of tingling, numbness.    .    coordination:    Finger to nose: normal.        Deep tendon Reflexes - 2 plus all over.         Neck Supple -  Yes.     MEDICATIONS    acetaminophen   Tablet 650 milliGRAM(s) Oral every 6 hours PRN  ascorbic acid 1000 milliGRAM(s) Oral daily  calcium carbonate  625 mG + Vitamin D (OsCal 250 + D) 1 Tablet(s) Oral two times a day  cefTRIAXone   IVPB 1 Gram(s) IV Intermittent every 24 hours  cefTRIAXone   IVPB      cholecalciferol 2000 Unit(s) Oral daily  dabigatran 150 milliGRAM(s) Oral every 12 hours  diltiazem    milliGRAM(s) Oral <User Schedule>  docusate sodium 100 milliGRAM(s) Oral at bedtime  ferrous    sulfate 325 milliGRAM(s) Oral daily  latanoprost 0.005% Ophthalmic Solution 1 Drop(s) Both EYES at bedtime  mirtazapine 7.5 milliGRAM(s) Oral at bedtime  multivitamin/minerals 1 Tablet(s) Oral daily  mycophenolate mofetil 1000 milliGRAM(s) Oral two times a day  omega-3-Acid Ethyl Esters 2 Gram(s) Oral two times a day  predniSONE   Tablet 40 milliGRAM(s) Oral daily  pyridostigmine 60 milliGRAM(s) Oral <User Schedule>  pyridostigmine  milliGRAM(s) Oral at bedtime  senna 1 Tablet(s) Oral at bedtime  tamsulosin 0.4 milliGRAM(s) Oral at bedtime      Allergies    immune globulin intravenous (Unknown)  penicillins (Rash)    Intolerances        LABS:  CBC Full  -  ( 04 Dec 2017 06:55 )  WBC Count : 21.6 K/uL  Hemoglobin : 12.6 g/dL  Hematocrit : 39.4 %  Platelet Count - Automated : 218 K/uL  Mean Cell Volume : 96.6 fl  Mean Cell Hemoglobin : 30.8 pg    Urinalysis Basic - ( 03 Dec 2017 12:46 )    Color: Yellow / Appearance: Clear / S.015 / pH: x  Gluc: x / Ketone: Negative  / Bili: Negative / Urobili: Negative   Blood: x / Protein: Negative / Nitrite: Positive   Leuk Esterase: Trace / RBC: 6-10 /HPF / WBC 11-25   Sq Epi: x / Non Sq Epi: Few / Bacteria: Many          145  |  110<H>  |  16  ----------------------------<  113<H>  4.0   |  31  |  0.64    Ca    8.9      04 Dec 2017 06:55    TPro  6.2  /  Alb  3.4  /  TBili  0.6  /  DBili  x   /  AST  18  /  ALT  23  /  AlkPhos  87  12-03    Hemoglobin A1C:     Vitamin B12     RADIOLOGY    ASSESSMENT AND PLAN:      sepsis/uti.  myasthenia gravis stable.    continue mestinone/steroid and cellcept.  antibiotic as per ID.  Physical therapy evaluation.  OOB to chair/ambulation with assistance only.  Advanced care planning was discussed with family.  Pain is accessed and addressed.  Would continue to follow.

## 2017-12-04 NOTE — PROGRESS NOTE ADULT - PROBLEM SELECTOR PLAN 5
coker catheter dc/d, patient urinating independently  no known history of BPH  Continue with flomax  follow up Dr. Geronimo, urology, recs

## 2017-12-05 ENCOUNTER — TRANSCRIPTION ENCOUNTER (OUTPATIENT)
Age: 72
End: 2017-12-05

## 2017-12-05 DIAGNOSIS — N30.01 ACUTE CYSTITIS WITH HEMATURIA: ICD-10-CM

## 2017-12-05 LAB
-  AMIKACIN: SIGNIFICANT CHANGE UP
-  AMPICILLIN/SULBACTAM: SIGNIFICANT CHANGE UP
-  AMPICILLIN: SIGNIFICANT CHANGE UP
-  AZTREONAM: SIGNIFICANT CHANGE UP
-  CEFAZOLIN: SIGNIFICANT CHANGE UP
-  CEFEPIME: SIGNIFICANT CHANGE UP
-  CEFOXITIN: SIGNIFICANT CHANGE UP
-  CEFTAZIDIME: SIGNIFICANT CHANGE UP
-  CEFTRIAXONE: SIGNIFICANT CHANGE UP
-  CIPROFLOXACIN: SIGNIFICANT CHANGE UP
-  ERTAPENEM: SIGNIFICANT CHANGE UP
-  GENTAMICIN: SIGNIFICANT CHANGE UP
-  IMIPENEM: SIGNIFICANT CHANGE UP
-  LEVOFLOXACIN: SIGNIFICANT CHANGE UP
-  MEROPENEM: SIGNIFICANT CHANGE UP
-  NITROFURANTOIN: SIGNIFICANT CHANGE UP
-  PIPERACILLIN/TAZOBACTAM: SIGNIFICANT CHANGE UP
-  TOBRAMYCIN: SIGNIFICANT CHANGE UP
-  TRIMETHOPRIM/SULFAMETHOXAZOLE: SIGNIFICANT CHANGE UP
ANION GAP SERPL CALC-SCNC: 6 MMOL/L — SIGNIFICANT CHANGE UP (ref 5–17)
BUN SERPL-MCNC: 18 MG/DL — SIGNIFICANT CHANGE UP (ref 7–23)
CALCIUM SERPL-MCNC: 8.5 MG/DL — SIGNIFICANT CHANGE UP (ref 8.5–10.1)
CHLORIDE SERPL-SCNC: 109 MMOL/L — HIGH (ref 96–108)
CO2 SERPL-SCNC: 29 MMOL/L — SIGNIFICANT CHANGE UP (ref 22–31)
CREAT SERPL-MCNC: 0.83 MG/DL — SIGNIFICANT CHANGE UP (ref 0.5–1.3)
CULTURE RESULTS: SIGNIFICANT CHANGE UP
GLUCOSE SERPL-MCNC: 79 MG/DL — SIGNIFICANT CHANGE UP (ref 70–99)
HCT VFR BLD CALC: 40 % — SIGNIFICANT CHANGE UP (ref 39–50)
HGB BLD-MCNC: 12.7 G/DL — LOW (ref 13–17)
MCHC RBC-ENTMCNC: 30.6 PG — SIGNIFICANT CHANGE UP (ref 27–34)
MCHC RBC-ENTMCNC: 31.9 GM/DL — LOW (ref 32–36)
MCV RBC AUTO: 95.8 FL — SIGNIFICANT CHANGE UP (ref 80–100)
METHOD TYPE: SIGNIFICANT CHANGE UP
ORGANISM # SPEC MICROSCOPIC CNT: SIGNIFICANT CHANGE UP
ORGANISM # SPEC MICROSCOPIC CNT: SIGNIFICANT CHANGE UP
PLATELET # BLD AUTO: 211 K/UL — SIGNIFICANT CHANGE UP (ref 150–400)
POTASSIUM SERPL-MCNC: 3.5 MMOL/L — SIGNIFICANT CHANGE UP (ref 3.5–5.3)
POTASSIUM SERPL-SCNC: 3.5 MMOL/L — SIGNIFICANT CHANGE UP (ref 3.5–5.3)
RBC # BLD: 4.17 M/UL — LOW (ref 4.2–5.8)
RBC # FLD: 14.1 % — SIGNIFICANT CHANGE UP (ref 10.3–14.5)
SODIUM SERPL-SCNC: 144 MMOL/L — SIGNIFICANT CHANGE UP (ref 135–145)
SPECIMEN SOURCE: SIGNIFICANT CHANGE UP
WBC # BLD: 15.4 K/UL — HIGH (ref 3.8–10.5)
WBC # FLD AUTO: 15.4 K/UL — HIGH (ref 3.8–10.5)

## 2017-12-05 PROCEDURE — 99233 SBSQ HOSP IP/OBS HIGH 50: CPT | Mod: GC

## 2017-12-05 RX ORDER — OXYCODONE AND ACETAMINOPHEN 5; 325 MG/1; MG/1
1 TABLET ORAL EVERY 6 HOURS
Qty: 0 | Refills: 0 | Status: DISCONTINUED | OUTPATIENT
Start: 2017-12-05 | End: 2017-12-06

## 2017-12-05 RX ADMIN — Medication 1 TABLET(S): at 11:14

## 2017-12-05 RX ADMIN — MIRTAZAPINE 7.5 MILLIGRAM(S): 45 TABLET, ORALLY DISINTEGRATING ORAL at 21:24

## 2017-12-05 RX ADMIN — Medication 325 MILLIGRAM(S): at 11:14

## 2017-12-05 RX ADMIN — PYRIDOSTIGMINE BROMIDE 60 MILLIGRAM(S): 60 SOLUTION ORAL at 17:49

## 2017-12-05 RX ADMIN — MYCOPHENOLATE MOFETIL 1000 MILLIGRAM(S): 250 CAPSULE ORAL at 17:50

## 2017-12-05 RX ADMIN — SENNA PLUS 1 TABLET(S): 8.6 TABLET ORAL at 21:24

## 2017-12-05 RX ADMIN — Medication 180 MILLIGRAM(S): at 06:19

## 2017-12-05 RX ADMIN — Medication 2 GRAM(S): at 06:19

## 2017-12-05 RX ADMIN — Medication 40 MILLIGRAM(S): at 06:19

## 2017-12-05 RX ADMIN — DABIGATRAN ETEXILATE MESYLATE 150 MILLIGRAM(S): 150 CAPSULE ORAL at 06:20

## 2017-12-05 RX ADMIN — PYRIDOSTIGMINE BROMIDE 60 MILLIGRAM(S): 60 SOLUTION ORAL at 06:19

## 2017-12-05 RX ADMIN — MYCOPHENOLATE MOFETIL 1000 MILLIGRAM(S): 250 CAPSULE ORAL at 12:22

## 2017-12-05 RX ADMIN — Medication 2000 UNIT(S): at 11:14

## 2017-12-05 RX ADMIN — Medication 1 TABLET(S): at 17:50

## 2017-12-05 RX ADMIN — LATANOPROST 1 DROP(S): 0.05 SOLUTION/ DROPS OPHTHALMIC; TOPICAL at 21:24

## 2017-12-05 RX ADMIN — DABIGATRAN ETEXILATE MESYLATE 150 MILLIGRAM(S): 150 CAPSULE ORAL at 17:50

## 2017-12-05 RX ADMIN — PYRIDOSTIGMINE BROMIDE 60 MILLIGRAM(S): 60 SOLUTION ORAL at 11:16

## 2017-12-05 RX ADMIN — OXYCODONE AND ACETAMINOPHEN 1 TABLET(S): 5; 325 TABLET ORAL at 15:48

## 2017-12-05 RX ADMIN — Medication 2 GRAM(S): at 17:49

## 2017-12-05 RX ADMIN — Medication 1 TABLET(S): at 06:24

## 2017-12-05 RX ADMIN — PYRIDOSTIGMINE BROMIDE 180 MILLIGRAM(S): 60 SOLUTION ORAL at 21:26

## 2017-12-05 RX ADMIN — PYRIDOSTIGMINE BROMIDE 60 MILLIGRAM(S): 60 SOLUTION ORAL at 14:57

## 2017-12-05 RX ADMIN — CEFTRIAXONE 100 GRAM(S): 500 INJECTION, POWDER, FOR SOLUTION INTRAMUSCULAR; INTRAVENOUS at 17:50

## 2017-12-05 RX ADMIN — Medication 1000 MILLIGRAM(S): at 11:14

## 2017-12-05 RX ADMIN — Medication 1 DROP(S): at 06:35

## 2017-12-05 RX ADMIN — TAMSULOSIN HYDROCHLORIDE 0.4 MILLIGRAM(S): 0.4 CAPSULE ORAL at 21:24

## 2017-12-05 RX ADMIN — Medication 100 MILLIGRAM(S): at 21:24

## 2017-12-05 NOTE — DISCHARGE NOTE ADULT - SECONDARY DIAGNOSIS.
Myasthenia gravis Atrial fibrillation, unspecified type Herpes virus disease Iron deficiency anemia Sleep apnea Urinary retention

## 2017-12-05 NOTE — DISCHARGE NOTE ADULT - CARE PROVIDER_API CALL
kyung,   Phone: (   )    -  Fax: (   )    -    Vicki Goodrich (MD), Cardiovascular Disease; Internal Medicine  [address] [place] [address]  Phone: [phone]  Fax: [phone]    Javier Fernando (MARCOS), Neurology  [address] [place] [address]  Phone: [phone]  Fax: [phone]    Ahsan Geronimo), Urology  [address] [place] [address]  Phone: [phone]  Fax: [phone]

## 2017-12-05 NOTE — CONSULT NOTE ADULT - SUBJECTIVE AND OBJECTIVE BOX
CHIEF COMPLAINT: dysuria, hematuria    HISTORY OF PRESENT ILLNESS: Pt was admitted 17 with difficulty voiding dysuria and hematuria that started . He was found to have UTI, with temps to 102. With treatment he feels much better, and is voiding freely now. Blood c&s were +'ve, he is on rocephin now.    PAST MEDICAL & SURGICAL HISTORY:  Herpes virus disease: right eye  Sleep apnea: wear nasal strips  Glaucoma  Urinary retention  Osteopenia  Benign prostatic hyperplasia, presence of lower urinary tract symptoms unspecified, unspecified morphology  Nasal polyp  Iron deficiency anemia  Atrial fibrillation, unspecified type  Myasthenia gravis  TIA (transient ischemic attack):   GERD (gastroesophageal reflux disease)  HLD (hyperlipidemia)  H/O coronary angiogram  H/O nasal polypectomy  Other cataract: right      REVIEW OF SYSTEMS:     CONSTITUTIONAL: (+) fever/chills/weakness, now resolved  	EYES/ENT: No visual changes;  No vertigo or throat pain   	NECK: No pain or stiffness  	RESPIRATORY: No cough, wheezing, hemoptysis; No shortness of breath  	CARDIOVASCULAR: No chest pain or palpitations  	GASTROINTESTINAL: No abdominal or epigastric pain. No nausea, vomiting, or hematemesis; No diarrhea or constipation. No melena or hematochezia.  	GENITOURINARY: (+) dysuria, hematuria, now resovled  	NEUROLOGICAL: No numbness or weakness  	SKIN: (+) chronic facial rash. No itching, burning, or lesions   All other review of systems is negative unless indicated above.    MEDICATIONS  (STANDING):  ascorbic acid 1000 milliGRAM(s) Oral daily  calcium carbonate  625 mG + Vitamin D (OsCal 250 + D) 1 Tablet(s) Oral two times a day  cefTRIAXone   IVPB 1 Gram(s) IV Intermittent every 24 hours  cefTRIAXone   IVPB      cholecalciferol 2000 Unit(s) Oral daily  dabigatran 150 milliGRAM(s) Oral every 12 hours  diltiazem    milliGRAM(s) Oral <User Schedule>  docusate sodium 100 milliGRAM(s) Oral at bedtime  ferrous    sulfate 325 milliGRAM(s) Oral daily  ganciclovir 0.15% Ophthalmic Gel 1 Drop(s) Right EYE daily  latanoprost 0.005% Ophthalmic Solution 1 Drop(s) Both EYES at bedtime  mirtazapine 7.5 milliGRAM(s) Oral at bedtime  multivitamin/minerals 1 Tablet(s) Oral daily  mycophenolate mofetil 1000 milliGRAM(s) Oral two times a day  omega-3-Acid Ethyl Esters 2 Gram(s) Oral two times a day  predniSONE   Tablet 40 milliGRAM(s) Oral daily  pyridostigmine 60 milliGRAM(s) Oral <User Schedule>  pyridostigmine  milliGRAM(s) Oral at bedtime  senna 1 Tablet(s) Oral at bedtime  tamsulosin 0.4 milliGRAM(s) Oral at bedtime    MEDICATIONS  (PRN):  acetaminophen   Tablet 650 milliGRAM(s) Oral every 6 hours PRN For Temp greater than 38 C (100.4 F)      Allergies    immune globulin intravenous (Unknown)  penicillins (Rash)    Intolerances        SOCIAL HISTORY:    FAMILY HISTORY:  Family history of anemia (Sibling)      Vital Signs Last 24 Hrs  T(C): 36.6 (05 Dec 2017 05:24), Max: 37.1 (04 Dec 2017 14:04)  T(F): 97.8 (05 Dec 2017 05:24), Max: 98.7 (04 Dec 2017 14:04)  HR: 70 (05 Dec 2017 05:24) (63 - 80)  BP: 158/72 (05 Dec 2017 05:24) (129/64 - 158/72)  BP(mean): --  RR: 18 (05 Dec 2017 05:24) (17 - 18)  SpO2: 97% (05 Dec 2017 05:24) (97% - 98%)    PHYSICAL EXAM:    Constitutional: NAD, well-developed  HEENT: NEPTALI, EOMI, Normal Hearing, MMM  Neck: No LAD, No JVD  Back: Normal spine flexure, No CVA tenderness  Respiratory: not using accessory muscles   Abd: BS+, soft, NT/ND, No CVAT  : Normal phallus,open meatus,bilateral descended testes, no masses  Extremities: No peripheral edema  Vascular: 2+ peripheral pulses  Neurological: A/O x 3, no focal deficits  Psychiatric: Normal mood, normal affect  Musculoskeletal: 5/5 strength b/l upper and lower extremities  Skin: No rashes    LABS:                        12.7   15.4  )-----------( 211      ( 05 Dec 2017 06:51 )             40.0     12-05    144  |  109<H>  |  18  ----------------------------<  79  3.5   |  29  |  0.83    Ca    8.5      05 Dec 2017 06:51    TPro  6.2  /  Alb  3.4  /  TBili  0.6  /  DBili  x   /  AST  18  /  ALT  23  /  AlkPhos  87  12-03    PT/INR - ( 03 Dec 2017 12:15 )   PT: 12.3 sec;   INR: 1.13 ratio         PTT - ( 03 Dec 2017 12:15 )  PTT:28.0 sec  Urinalysis Basic - ( 03 Dec 2017 12:46 )    Color: Yellow / Appearance: Clear / S.015 / pH: x  Gluc: x / Ketone: Negative  / Bili: Negative / Urobili: Negative   Blood: x / Protein: Negative / Nitrite: Positive   Leuk Esterase: Trace / RBC: 6-10 /HPF / WBC 11-25   Sq Epi: x / Non Sq Epi: Few / Bacteria: Many      Urine Culture:     RADIOLOGY & ADDITIONAL STUDIES:

## 2017-12-05 NOTE — DISCHARGE NOTE ADULT - PLAN OF CARE
resolution You were diagnosed with a UTI during your hospital admission. Please take Bactrim DS 1 tablet every 12 hours for a total of ten days, with the last day on 12/16/17. Follow up with PCP, Dr. Akhtar, and Dr. Geronimo, urology, within one week of completing antibiotics. continue to take all home medications as directed. Follow up with Dr. Fernando, neurologist. continue to take diltiazem and pradaxa as directed. Please follow up with Dr. Goodrich, cardiologist. continue to take ganciclovir eye drops as directed. Follow up with PCP. continue to take ferrous sulfate and vitamin C. Follow up with PCP. continue to use nasal strips and cones. Follow up with PCP. Please follow up with Dr. Geronimo within one week of completing antibiotics

## 2017-12-05 NOTE — DISCHARGE NOTE ADULT - CARE PLAN
Principal Discharge DX:	UTI (urinary tract infection)  Goal:	resolution  Instructions for follow-up, activity and diet:	You were diagnosed with a UTI during your hospital admission. Please take Bactrim DS 1 tablet every 12 hours for a total of ten days, with the last day on 12/16/17. Follow up with PCP, Dr. Akhtar, and Dr. Geronimo, urology, within one week of completing antibiotics.  Secondary Diagnosis:	Myasthenia gravis  Instructions for follow-up, activity and diet:	continue to take all home medications as directed. Follow up with Dr. Fernando, neurologist.  Secondary Diagnosis:	Atrial fibrillation, unspecified type  Instructions for follow-up, activity and diet:	continue to take diltiazem and pradaxa as directed. Please follow up with Dr. Goodrich, cardiologist.  Secondary Diagnosis:	Herpes virus disease  Instructions for follow-up, activity and diet:	continue to take ganciclovir eye drops as directed. Follow up with PCP.  Secondary Diagnosis:	Iron deficiency anemia  Instructions for follow-up, activity and diet:	continue to take ferrous sulfate and vitamin C. Follow up with PCP.  Secondary Diagnosis:	Sleep apnea  Instructions for follow-up, activity and diet:	continue to use nasal strips and cones. Follow up with PCP.  Secondary Diagnosis:	Urinary retention  Instructions for follow-up, activity and diet:	Please follow up with Dr. Geronimo within one week of completing antibiotics

## 2017-12-05 NOTE — DISCHARGE NOTE ADULT - MEDICATION SUMMARY - MEDICATIONS TO TAKE
I will START or STAY ON the medications listed below when I get home from the hospital:    predniSONE  -- 40 milligram(s) by mouth once a day (in the morning)  -- Indication: For Myasthenia gravis    tamsulosin 0.4 mg oral capsule  -- 1 cap(s) by mouth once a day (at bedtime)  -- Indication: For BPH    diltiazem 180 mg/24 hours oral capsule, extended release  -- 1 cap(s) by mouth once a day  -- Indication: For Atrial fibrillation, unspecified type    Pradaxa 150 mg oral capsule  -- 1 cap(s) by mouth 2 times a day resume on 7/19/16  -- Indication: For Atrial fibrillation, unspecified type    mirtazapine 7.5 mg oral tablet  -- 1 tab(s) by mouth once a day (at bedtime)  -- Indication: For Anxiety    Mestinon Timespan 180 mg oral tablet, extended release  -- 1 dose(s) by mouth once a day (at bedtime)  -- Indication: For Myasthenia gravis    Mestinon 60 mg oral tablet  -- 1 tab(s) by mouth 4 times a day  -- It is very important that you take or use this exactly as directed.  Do not skip doses or discontinue unless directed by your doctor.  Take with food or milk.    -- Indication: For Myasthenia gravis    CellCept 500 mg oral tablet  -- 2 tab(s) by mouth 2 times a day  -- Do not take this drug if you are pregnant.    -- Indication: For Myasthenia gravis    ferrous sulfate 325 mg (65 mg elemental iron) oral delayed release tablet  -- 1 tab(s) by mouth once a day  -- Indication: For Prophylactic measure    Senna 8.6 mg oral tablet  -- 1 tab(s) by mouth once a day (at bedtime)  -- Indication: For Constipation    Colace 50 mg oral capsule  -- 1 cap(s) by mouth once a day (at bedtime)  -- Indication: For constipation    sulfamethoxazole-trimethoprim 800 mg-160 mg oral tablet  -- 1 tab(s) by mouth every 12 hours for 10 days  -- Indication: For UTI (urinary tract infection)    Fish Oil 1200 mg oral capsule  -- 1 cap(s) by mouth once a day  -- Indication: For Prophylactic measure    Glucosamine & Chondroitin with MSM oral tablet  -- 1 tab(s) by mouth once a day (at bedtime)  -- Indication: For Prophylactic measure    Zirgan 0.15% ophthalmic gel  -- 1 drop(s) to each affected eye once a day  -- Indication: For Herpes virus disease    latanoprost 0.005% ophthalmic solution  -- 1 drop(s) to each affected eye once a day (in the evening)  -- Indication: For Glaucoma    Centrum Silver oral tablet  -- 1 tab(s) by mouth once a day  -- Indication: For Prophylactic measure    Calcium 600+D 600 mg-200 intl units oral tablet  -- 1 tab(s) by mouth 2 times a day  -- Indication: For Osteopenia    Vitamin C 1000 mg oral tablet  -- 1 tab(s) by mouth once a day  -- Indication: For Prophylactic measure    Vitamin D3 2000 intl units oral tablet  -- 1 tab(s) by mouth once a day  -- Indication: For Osteopenia I will START or STAY ON the medications listed below when I get home from the hospital:    predniSONE  -- 40 milligram(s) by mouth once a day (in the morning)  -- Indication: For Myasthenia gravis    tamsulosin 0.4 mg oral capsule  -- 1 cap(s) by mouth once a day (at bedtime)  -- Indication: For Urinary retention    diltiazem 180 mg/24 hours oral capsule, extended release  -- 1 cap(s) by mouth once a day  -- Indication: For Atrial fibrillation, unspecified type    Pradaxa 150 mg oral capsule  -- 1 cap(s) by mouth 2 times a day resume on 7/19/16  -- Indication: For Atrial fibrillation, unspecified type    mirtazapine 7.5 mg oral tablet  -- 1 tab(s) by mouth once a day (at bedtime)  -- Indication: For depression    Mestinon Timespan 180 mg oral tablet, extended release  -- 1 dose(s) by mouth once a day (at bedtime)  -- Indication: For Myasthenia gravis    Mestinon 60 mg oral tablet  -- 1 tab(s) by mouth 4 times a day  -- It is very important that you take or use this exactly as directed.  Do not skip doses or discontinue unless directed by your doctor.  Take with food or milk.    -- Indication: For Myasthenia gravis    CellCept 500 mg oral tablet  -- 2 tab(s) by mouth 2 times a day  -- Do not take this drug if you are pregnant.    -- Indication: For Myasthenia gravis    ferrous sulfate 325 mg (65 mg elemental iron) oral delayed release tablet  -- 1 tab(s) by mouth once a day  -- Indication: For Anemia    Senna 8.6 mg oral tablet  -- 1 tab(s) by mouth once a day (at bedtime)  -- Indication: For constipation    Colace 50 mg oral capsule  -- 1 cap(s) by mouth once a day (at bedtime)  -- Indication: For constipation    sulfamethoxazole-trimethoprim 800 mg-160 mg oral tablet  -- 1 tab(s) by mouth every 12 hours for 10 days  -- Indication: For bacteremia    Fish Oil 1200 mg oral capsule  -- 1 cap(s) by mouth once a day  -- Indication: For Hyperlipidemia    Glucosamine & Chondroitin with MSM oral tablet  -- 1 tab(s) by mouth once a day (at bedtime)  -- Indication: For Supplement    Zirgan 0.15% ophthalmic gel  -- 1 drop(s) to each affected eye once a day  -- Indication: For eye drops    latanoprost 0.005% ophthalmic solution  -- 1 drop(s) to each affected eye once a day (in the evening)  -- Indication: For eye drops    Centrum Silver oral tablet  -- 1 tab(s) by mouth once a day  -- Indication: For Supplement    Calcium 600+D 600 mg-200 intl units oral tablet  -- 1 tab(s) by mouth 2 times a day  -- Indication: For Supplement    Vitamin C 1000 mg oral tablet  -- 1 tab(s) by mouth once a day  -- Indication: For Supplement    Vitamin D3 2000 intl units oral tablet  -- 1 tab(s) by mouth once a day  -- Indication: For Supplement

## 2017-12-05 NOTE — DISCHARGE NOTE ADULT - PATIENT PORTAL LINK FT
“You can access the [employer] Patient Portal, offered by [facility], by registering with the following website: [email or web address]”

## 2017-12-05 NOTE — CONSULT NOTE ADULT - PROBLEM SELECTOR RECOMMENDATION 9
Pt is improving on abiots. Further abiots per ID. Nml renal sono 11/17/17. Pt will f/u my office 1 week after abiots done.
Most suggestive localization is to the urinary system with left CVA tenderness, urinary retention, burning with urination, and abn UA. Recommend broad spectrum abx but focused on urinary as likely source. Antibiotic selection impacted by impact of certain abx on myasthenia gravis.

## 2017-12-05 NOTE — DISCHARGE NOTE ADULT - HOSPITAL COURSE
70 y/o M PMHx of HLD, A-fib, MG (4/2016) on prednisone, TIA (2006), urinary retention, sleep apnea no CPAP, glaucoma, herpes virus of right eye, iron deficiency anemia presenting with fever and chills since 6am. Patient reports dysuria, increase urge to urinate, hematuria, nonradiating dull lower back pain(different than recent back pain) x2 days. Patient was seen by Dr. Geronimo on 11/17/17 in the office for left sided back pain. Ultrasound and UA were both unremarkable. Continued the flomax and was sent to [facility] ED on 11/17/17 for CT lumbar spine that showed degenerative changes. No trauma. Discharged home on tylenol and lidocaine patch. Denies SOB, CP, cough, sore throat, N/V/D, or BRBPR. No sick contacts. No travel.    In the ED, febrile 102.8, , /73, sat 94% RA, RR 16, WBC 18.6, H/H 13.8/43.1, plat 252, Bands 9.0, Coags PT 12.3, INR 1.13, PTT 28, BUN 16, Cr 0.75, Glu 110, UA positive nitrites, trace leuk, mod blood, wbc 11-25, rbc 6-10, many bacteria, CXR There are prominent bronchovascular markings at the left lower lung zone.No lobar lung consolidation significant pleural effusion or pneumothorax is noted. EKG sinus tachy @119BPM. Received IV azactam, vanco, tylenol 70 y/o M PMHx of HLD, A-fib, MG (4/2016) on prednisone, TIA (2006), urinary retention, sleep apnea no CPAP, glaucoma, herpes virus of right eye, iron deficiency anemia presenting with fever and chills since 6am. Patient reports dysuria, increase urge to urinate, hematuria, nonradiating dull lower back pain(different than recent back pain) x2 days. Patient was seen by Dr. Geronimo on 11/17/17 in the office for left sided back pain. Ultrasound and UA were both unremarkable. Continued the flomax and was sent to [place] ED on 11/17/17 for CT lumbar spine that showed degenerative changes. No trauma. Discharged home on tylenol and lidocaine patch. Denies SOB, CP, cough, sore throat, N/V/D, or BRBPR. No sick contacts. No travel.    In the ED, febrile 102.8, , /73, sat 94% RA, RR 16, WBC 18.6, H/H 13.8/43.1, plat 252, Bands 9.0, Coags PT 12.3, INR 1.13, PTT 28, BUN 16, Cr 0.75, Glu 110, UA positive nitrites, trace leuk, mod blood, wbc 11-25, rbc 6-10, many bacteria, CXR There are prominent bronchovascular markings at the left lower lung zone.No lobar lung consolidation significant pleural effusion or pneumothorax is noted. EKG sinus tachy @119BPM. Received IV azactam, vanco, tylenol.    Patient diagnosed with UTI, placed on Rocephin. Urinary retention resolved. Seen by Dr. Fernando, neurologist, also patient's outpatient neurologist, patient kept on his existing myasthenia gravis medications. Continued on existing medications for HSV, glaucoma, BPH, iron deficiency anemia.  Patient's clinical condition improved, with resolution of fever, leukocytosis, dysuria, hematuria, lower back pain. Seen by Dr. Geronimo, urology, who recommended continuing antibiotics and following up as outpatient after discharge. Patient discharged on Bactrim DS q 12 for 10 days, per ID recommendations, with instructions to follow up with outpatient providers.    VS T 98.8 /68 HR 81 RR 16 O2 sat 98 on room air    PHYSICAL EXAM:  GENERAL: NAD, well-groomed, well-developed  HEAD:  Atraumatic, Normocephalic  EYES: EOMI, PERRLA, conjunctiva and sclera clear  ENMT: No tonsillar erythema, exudates, or enlargement; Moist mucous membranes,   NECK: Supple, No JVD, Normal thyroid  NERVOUS SYSTEM:  Alert & Oriented X3, Good concentration; Motor Strength 5/5 B/L upper and lower extremities  CHEST/LUNG: Clear to auscultation bilaterally; No rales, rhonchi, wheezing, or rubs  HEART: Regular rate and rhythm; No murmurs, rubs, or gallops  ABDOMEN: Soft, Nontender, Nondistended; Bowel sounds present  EXTREMITIES:  2+ Peripheral Pulses, No clubbing, cyanosis, or edema  LYMPH: No lymphadenopathy noted  SKIN: No rashes or lesions 70 y/o M PMHx of HLD, A-fib, MG (4/2016) on prednisone, TIA (2006), urinary retention, sleep apnea no CPAP, glaucoma, herpes virus of right eye, iron deficiency anemia presenting with fever and chills since 6am. Patient reports dysuria, increase urge to urinate, hematuria, nonradiating dull lower back pain(different than recent back pain) x2 days. Patient was seen by Dr. Geronimo on 11/17/17 in the office for left sided back pain. Ultrasound and UA were both unremarkable. Continued the flomax and was sent to [place] ED on 11/17/17 for CT lumbar spine that showed degenerative changes. No trauma. Discharged home on tylenol and lidocaine patch. Denies SOB, CP, cough, sore throat, N/V/D, or BRBPR. No sick contacts. No travel.    In the ED, febrile 102.8, , /73, sat 94% RA, RR 16, WBC 18.6, H/H 13.8/43.1, plat 252, Bands 9.0, Coags PT 12.3, INR 1.13, PTT 28, BUN 16, Cr 0.75, Glu 110, UA positive nitrites, trace leuk, mod blood, wbc 11-25, rbc 6-10, many bacteria, CXR There are prominent bronchovascular markings at the left lower lung zone.No lobar lung consolidation significant pleural effusion or pneumothorax is noted. EKG sinus tachy @119BPM. Received IV azactam, vanco, tylenol.    Patient diagnosed with E.coli UTI and bacteremia, placed on Rocephin. Urinary retention resolved. Seen by Dr. Fernando, neurologist, also patient's outpatient neurologist, patient kept on his existing myasthenia gravis medications. Continued on existing medications for HSV, glaucoma, BPH, iron deficiency anemia.  Patient's clinical condition improved, with resolution of fever, leukocytosis, dysuria, hematuria, lower back pain. Seen by Dr. Geronimo, urology, who recommended continuing antibiotics and following up as outpatient after discharge. Patient discharged on Bactrim DS q 12 for 10 days, per ID recommendations, with instructions to follow up with outpatient providers.    VS T 98.8 /68 HR 81 RR 16 O2 sat 98 on room air    PHYSICAL EXAM:  GENERAL: NAD, well-groomed, well-developed  HEAD:  Atraumatic, Normocephalic  EYES: EOMI, PERRLA, conjunctiva and sclera clear  ENMT: No tonsillar erythema, exudates, or enlargement; Moist mucous membranes,   NECK: Supple, No JVD, Normal thyroid  NERVOUS SYSTEM:  Alert & Oriented X3, Good concentration; Motor Strength 5/5 B/L upper and lower extremities  CHEST/LUNG: Clear to auscultation bilaterally; No rales, rhonchi, wheezing, or rubs  HEART: Regular rate and rhythm; No murmurs, rubs, or gallops  ABDOMEN: Soft, Nontender, Nondistended; Bowel sounds present  EXTREMITIES:  2+ Peripheral Pulses, No clubbing, cyanosis, or edema  LYMPH: No lymphadenopathy noted  SKIN: No rashes or lesions    Completion of discharge in 32 minutes.

## 2017-12-05 NOTE — PROGRESS NOTE ADULT - SUBJECTIVE AND OBJECTIVE BOX
[facility], Division of Infectious Diseases  BENNY Millan A. Lee    Name: SAL SUTTON  Age: 72y  Gender: Male  MRN: [id number]    Interval History--  Notes reviewed    Past Medical History--  Herpes virus disease  Sleep apnea  Glaucoma  Urinary retention  Osteopenia  Benign prostatic hyperplasia, presence of lower urinary tract symptoms unspecified, unspecified morphology  Nasal polyp  Iron deficiency anemia  Atrial fibrillation, unspecified type  Myasthenia gravis  TIA (transient ischemic attack)  GERD (gastroesophageal reflux disease)  HLD (hyperlipidemia)  H/O coronary angiogram  H/O nasal polypectomy  Other cataract  No significant past surgical history      For details regarding the patient's social history, family history, and other miscellaneous elements, please refer the initial infectious diseases consultation and/or the admitting history and physical examination for this admission.    Allergies    immune globulin intravenous (Unknown)  penicillins (Rash)    Intolerances        Medications--  Antibiotics:  cefTRIAXone   IVPB 1 Gram(s) IV Intermittent every 24 hours  cefTRIAXone   IVPB        Immunologic:  mycophenolate mofetil 1000 milliGRAM(s) Oral two times a day    Other:  acetaminophen   Tablet PRN  ascorbic acid  calcium carbonate  625 mG + Vitamin D (OsCal 250 + D)  cholecalciferol  dabigatran  diltiazem   CD  docusate sodium  ferrous    sulfate  ganciclovir 0.15% Ophthalmic Gel  latanoprost 0.005% Ophthalmic Solution  mirtazapine  multivitamin/minerals  omega-3-Acid Ethyl Esters  predniSONE   Tablet  pyridostigmine  pyridostigmine SR  senna  tamsulosin      Review of Systems--  A 10-point review of systems was obtained.     Pertinent positives and negatives--  Constitutional: No fevers. No Chills. No Rigors.   Cardiovascular: No chest pain. No palpitations.  Respiratory: No shortness of breath. No cough.  Gastrointestinal: No nausea or vomiting. No diarrhea or constipation.   Psychiatric: Pleasant. Appropriate affect.    Review of systems otherwise negative except as previously noted.    Physical Examination--  Vital Signs: T(F): 97.8 (12-05-17 @ 05:24), Max: 98.7 (12-04-17 @ 14:04)  HR: 70 (12-05-17 @ 05:24)  BP: 158/72 (12-05-17 @ 05:24)  RR: 18 (12-05-17 @ 05:24)  SpO2: 97% (12-05-17 @ 05:24)  Wt(kg): --  General: Nontoxic-appearing Male in no acute distress.  HEENT: AT/NC. PERRL. EOMI. Anicteric. Conjunctiva pink and moist. Oropharynx clear. Dentition fair.  Neck: Not rigid. No sense of mass.  Nodes: None palpable.  Lungs: Clear bilaterally without rales, wheezing or rhonchi  Heart: Regular rate and rhythm. No Murmur. No rub. No gallop. No palpable thrill.  Abdomen: Bowel sounds present and normoactive. Soft. Nondistended. Nontender. No sense of mass. No organomegaly.  Back: No spinal tenderness. No costovertebral angle tenderness.   Extremities: No cyanosis or clubbing. No edema.   Skin: Warm. Dry. Good turgor. No rash. No vasculitic stigmata.  Psychiatric: Appropriate affect and mood for situation.         Laboratory Studies--  CBC                        12.6   21.6  )-----------( 218      ( 04 Dec 2017 06:55 )             39.4       Chemistries  12-04    145  |  110<H>  |  16  ----------------------------<  113<H>  4.0   |  31  |  0.64    Ca    8.9      04 Dec 2017 06:55    TPro  6.2  /  Alb  3.4  /  TBili  0.6  /  DBili  x   /  AST  18  /  ALT  23  /  AlkPhos  87  12-03      Culture Data [facility], Division of Infectious Diseases  BENNY Millan A. Lee    Name: SAL SUTTON  Age: 72y  Gender: Male  MRN: [id number]    Interval History--  Notes reviewed. Feeling ok. No fevers, chills, or rigors. No CP or SOB. No urinary symptoms presently.    Past Medical History--  Herpes virus disease  Sleep apnea  Glaucoma  Urinary retention  Osteopenia  Benign prostatic hyperplasia, presence of lower urinary tract symptoms unspecified, unspecified morphology  Nasal polyp  Iron deficiency anemia  Atrial fibrillation, unspecified type  Myasthenia gravis  TIA (transient ischemic attack)  GERD (gastroesophageal reflux disease)  HLD (hyperlipidemia)  H/O coronary angiogram  H/O nasal polypectomy  Other cataract  No significant past surgical history      For details regarding the patient's social history, family history, and other miscellaneous elements, please refer the initial infectious diseases consultation and/or the admitting history and physical examination for this admission.    Allergies    immune globulin intravenous (Unknown)  penicillins (Rash)    Intolerances        Medications--  Antibiotics:  cefTRIAXone   IVPB 1 Gram(s) IV Intermittent every 24 hours  cefTRIAXone   IVPB        Immunologic:  mycophenolate mofetil 1000 milliGRAM(s) Oral two times a day    Other:  acetaminophen   Tablet PRN  ascorbic acid  calcium carbonate  625 mG + Vitamin D (OsCal 250 + D)  cholecalciferol  dabigatran  diltiazem   CD  docusate sodium  ferrous    sulfate  ganciclovir 0.15% Ophthalmic Gel  latanoprost 0.005% Ophthalmic Solution  mirtazapine  multivitamin/minerals  omega-3-Acid Ethyl Esters  predniSONE   Tablet  pyridostigmine  pyridostigmine SR  senna  tamsulosin      Review of Systems--  A 10-point review of systems was obtained.     Pertinent positives and negatives--  Constitutional: No fevers. No Chills. No Rigors.   Cardiovascular: No chest pain. No palpitations.  Respiratory: No shortness of breath. No cough.  Gastrointestinal: No nausea or vomiting. No diarrhea or constipation.   Psychiatric: Pleasant. Appropriate affect.    Review of systems otherwise negative except as previously noted.    Physical Examination--  Vital Signs: T(F): 97.8 (12-05-17 @ 05:24), Max: 98.7 (12-04-17 @ 14:04)  HR: 70 (12-05-17 @ 05:24)  BP: 158/72 (12-05-17 @ 05:24)  RR: 18 (12-05-17 @ 05:24)  SpO2: 97% (12-05-17 @ 05:24)  Wt(kg): --  General: Nontoxic-appearing Male in no acute distress. SLightly cushingoid.  HEENT: AT/NC.  Anicteric. Conjunctiva pink and moist. Oropharynx clear. Nasal device in place.  Neck: Not rigid. No sense of mass.  Nodes: None palpable.  Lungs: Diinished breath sounds bilaterally without rales, wheezing or rhonchi  Heart: Regular rate and rhythm. No Murmur. No rub. No gallop. No palpable thrill.  Abdomen: Bowel sounds present and normoactive. Soft. Nondistended. Nontender. No sense of mass. No organomegaly.  Back: No spinal tenderness. No costovertebral angle tenderness.   Extremities: No cyanosis or clubbing. 1+edema.   Skin: Warm. Dry. Good turgor. Traumatic purpuric changes. No vasculitic stigmata.  Psychiatric: Appropriate affect and mood for situation.         Laboratory Studies--  CBC                        12.6   21.6  )-----------( 218      ( 04 Dec 2017 06:55 )             39.4       Chemistries  12-04    145  |  110<H>  |  16  ----------------------------<  113<H>  4.0   |  31  |  0.64    Ca    8.9      04 Dec 2017 06:55    TPro  6.2  /  Alb  3.4  /  TBili  0.6  /  DBili  x   /  AST  18  /  ALT  23  /  AlkPhos  87  12-03      Culture Data  F/U Cx pending  Prior blood/urine cx E. coli, sensi of blood isolate pending.

## 2017-12-05 NOTE — DISCHARGE NOTE ADULT - PROVIDER TOKENS
FREE:[LAST:[kyung],PHONE:[(   )    -],FAX:[(   )    -]],TOKEN:'[id number]:'[id number]:'[id number]'

## 2017-12-05 NOTE — DISCHARGE NOTE ADULT - VISION (WITH CORRECTIVE LENSES IF THE PATIENT USUALLY WEARS THEM):
uses glasses/Partially impaired: cannot see medication labels or newsprint, but can see obstacles in path, and the surrounding layout; can count fingers at arm's length

## 2017-12-06 VITALS
SYSTOLIC BLOOD PRESSURE: 117 MMHG | DIASTOLIC BLOOD PRESSURE: 68 MMHG | OXYGEN SATURATION: 98 % | HEART RATE: 81 BPM | TEMPERATURE: 99 F | RESPIRATION RATE: 16 BRPM

## 2017-12-06 LAB
-  AMIKACIN: SIGNIFICANT CHANGE UP
-  AMPICILLIN/SULBACTAM: SIGNIFICANT CHANGE UP
-  AMPICILLIN: SIGNIFICANT CHANGE UP
-  AZTREONAM: SIGNIFICANT CHANGE UP
-  CEFAZOLIN: SIGNIFICANT CHANGE UP
-  CEFEPIME: SIGNIFICANT CHANGE UP
-  CEFOXITIN: SIGNIFICANT CHANGE UP
-  CEFTAZIDIME: SIGNIFICANT CHANGE UP
-  CEFTRIAXONE: SIGNIFICANT CHANGE UP
-  CIPROFLOXACIN: SIGNIFICANT CHANGE UP
-  ERTAPENEM: SIGNIFICANT CHANGE UP
-  GENTAMICIN: SIGNIFICANT CHANGE UP
-  IMIPENEM: SIGNIFICANT CHANGE UP
-  LEVOFLOXACIN: SIGNIFICANT CHANGE UP
-  MEROPENEM: SIGNIFICANT CHANGE UP
-  PIPERACILLIN/TAZOBACTAM: SIGNIFICANT CHANGE UP
-  TOBRAMYCIN: SIGNIFICANT CHANGE UP
-  TRIMETHOPRIM/SULFAMETHOXAZOLE: SIGNIFICANT CHANGE UP
ANION GAP SERPL CALC-SCNC: 5 MMOL/L — SIGNIFICANT CHANGE UP (ref 5–17)
BUN SERPL-MCNC: 14 MG/DL — SIGNIFICANT CHANGE UP (ref 7–23)
CALCIUM SERPL-MCNC: 8.3 MG/DL — LOW (ref 8.5–10.1)
CHLORIDE SERPL-SCNC: 109 MMOL/L — HIGH (ref 96–108)
CO2 SERPL-SCNC: 30 MMOL/L — SIGNIFICANT CHANGE UP (ref 22–31)
CREAT SERPL-MCNC: 0.79 MG/DL — SIGNIFICANT CHANGE UP (ref 0.5–1.3)
CULTURE RESULTS: SIGNIFICANT CHANGE UP
CULTURE RESULTS: SIGNIFICANT CHANGE UP
GLUCOSE SERPL-MCNC: 80 MG/DL — SIGNIFICANT CHANGE UP (ref 70–99)
HCT VFR BLD CALC: 39.3 % — SIGNIFICANT CHANGE UP (ref 39–50)
HGB BLD-MCNC: 12.4 G/DL — LOW (ref 13–17)
MCHC RBC-ENTMCNC: 30.4 PG — SIGNIFICANT CHANGE UP (ref 27–34)
MCHC RBC-ENTMCNC: 31.6 GM/DL — LOW (ref 32–36)
MCV RBC AUTO: 96.1 FL — SIGNIFICANT CHANGE UP (ref 80–100)
METHOD TYPE: SIGNIFICANT CHANGE UP
ORGANISM # SPEC MICROSCOPIC CNT: SIGNIFICANT CHANGE UP
PLATELET # BLD AUTO: 208 K/UL — SIGNIFICANT CHANGE UP (ref 150–400)
POTASSIUM SERPL-MCNC: 4.1 MMOL/L — SIGNIFICANT CHANGE UP (ref 3.5–5.3)
POTASSIUM SERPL-SCNC: 4.1 MMOL/L — SIGNIFICANT CHANGE UP (ref 3.5–5.3)
RBC # BLD: 4.09 M/UL — LOW (ref 4.2–5.8)
RBC # FLD: 13.9 % — SIGNIFICANT CHANGE UP (ref 10.3–14.5)
SODIUM SERPL-SCNC: 144 MMOL/L — SIGNIFICANT CHANGE UP (ref 135–145)
SPECIMEN SOURCE: SIGNIFICANT CHANGE UP
SPECIMEN SOURCE: SIGNIFICANT CHANGE UP
WBC # BLD: 11.5 K/UL — HIGH (ref 3.8–10.5)
WBC # FLD AUTO: 11.5 K/UL — HIGH (ref 3.8–10.5)

## 2017-12-06 PROCEDURE — 99239 HOSP IP/OBS DSCHRG MGMT >30: CPT

## 2017-12-06 RX ADMIN — Medication 1 TABLET(S): at 05:41

## 2017-12-06 RX ADMIN — PYRIDOSTIGMINE BROMIDE 60 MILLIGRAM(S): 60 SOLUTION ORAL at 05:43

## 2017-12-06 RX ADMIN — PYRIDOSTIGMINE BROMIDE 60 MILLIGRAM(S): 60 SOLUTION ORAL at 11:25

## 2017-12-06 RX ADMIN — PYRIDOSTIGMINE BROMIDE 60 MILLIGRAM(S): 60 SOLUTION ORAL at 13:44

## 2017-12-06 RX ADMIN — Medication 2 GRAM(S): at 05:36

## 2017-12-06 RX ADMIN — Medication 1000 MILLIGRAM(S): at 11:30

## 2017-12-06 RX ADMIN — CEFTRIAXONE 100 GRAM(S): 500 INJECTION, POWDER, FOR SOLUTION INTRAMUSCULAR; INTRAVENOUS at 13:43

## 2017-12-06 RX ADMIN — Medication 2000 UNIT(S): at 11:30

## 2017-12-06 RX ADMIN — MYCOPHENOLATE MOFETIL 1000 MILLIGRAM(S): 250 CAPSULE ORAL at 11:27

## 2017-12-06 RX ADMIN — Medication 40 MILLIGRAM(S): at 05:36

## 2017-12-06 RX ADMIN — Medication 325 MILLIGRAM(S): at 11:25

## 2017-12-06 RX ADMIN — Medication 1 TABLET(S): at 11:29

## 2017-12-06 RX ADMIN — DABIGATRAN ETEXILATE MESYLATE 150 MILLIGRAM(S): 150 CAPSULE ORAL at 05:36

## 2017-12-06 RX ADMIN — Medication 180 MILLIGRAM(S): at 05:36

## 2017-12-06 RX ADMIN — Medication 1 DROP(S): at 06:00

## 2017-12-06 NOTE — PROGRESS NOTE ADULT - PROBLEM SELECTOR PLAN 1
Doing well
blood cultures + E.coli  WBC trended up   follow up repeat blood cultures  completed IV vancomycin  S/P 1 dose of aztreonam in ED.  continue to monitor VS, CBC, CMP  Tylenol prn fever  RVP negative   Dr. Quick (ID)
F/U repeat cx data  Cont Abx

## 2017-12-06 NOTE — PROGRESS NOTE ADULT - PROBLEM SELECTOR PLAN 3
As per primary team
Controlled - continue with cardizem (hold parameters) and pradaxa
As per primary team

## 2017-12-06 NOTE — PROGRESS NOTE ADULT - PROBLEM SELECTOR PLAN 2
Bactrim DS 1 PO Q12H  Would complete 2 weeks of antibiotics in total today #4  May give today's dose of ceftriaxone early, if needed, to facilitate discharge
Urine Cx grew E. coli  s/p aztreonam  coker catheter dc/d  continue with Cordell Geronimo, urology, follow up recs
Await sensi of E. coli and clearance of bacteremia  Cont Abx

## 2017-12-06 NOTE — PROGRESS NOTE ADULT - SUBJECTIVE AND OBJECTIVE BOX
Neurology Follow up note    SAL SUTTON72yMale    HPI:  72 y/o M PMHx of HLD, A-fib, MG (4/2016) on prednisone, TIA (2006), urinary retention, sleep apnea no CPAP, glaucoma, herpes virus of right eye, iron deficiency anemia presenting with fever and chills since 6am. Patient reports dysuria, increase urge to urinate, hematuria, nonradiating dull lower back pain(different than recent back pain) x2 days. Patient was seen by Dr. Geronimo on 11/17/17 in the office for left sided back pain. Ultrasound and UA were both unremarkable. Continued the flomax and was sent to [facility] ED on 11/17/17 for CT lumbar spine that showed degenerative changes. No trauma. Discharged home on tylenol and lidocaine patch. Denies SOB, CP, cough, sore throat, N/V/D, or BRBPR. No sick contacts. No travel.    In the ED, febrile 102.8, , /73, sat 94% RA, RR 16, WBC 18.6, H/H 13.8/43.1, plat 252, Bands 9.0, Coags PT 12.3, INR 1.13, PTT 28, BUN 16, Cr 0.75, Glu 110, UA positive nitrites, trace leuk, mod blood, wbc 11-25, rbc 6-10, many bacteria, CXR There are prominent bronchovascular markings at the left lower lung zone.No lobar lung consolidation significant pleural effusion or pneumothorax is noted. EKG sinus tachy @119BPM. Received IV azactam, vanco, tylenol (03 Dec 2017 13:09)      Interval History -doing well.  no new weakness.    Patient is seen, chart was reviewed and case was discussed with the treatment team.  Pt is not in any distress.   Lying on bed comfortably.   No events reported overnight.   No clinical seizure was reported.  Sitting on chair bed comfortably.    is at bedside.    Vital Signs Last 24 Hrs  T(C): 37.1 (06 Dec 2017 13:57), Max: 37.1 (06 Dec 2017 13:57)  T(F): 98.8 (06 Dec 2017 13:57), Max: 98.8 (06 Dec 2017 13:57)  HR: 81 (06 Dec 2017 13:57) (60 - 81)  BP: 117/68 (06 Dec 2017 13:57) (117/68 - 122/70)  BP(mean): --  RR: 16 (06 Dec 2017 13:57) (16 - 18)  SpO2: 98% (06 Dec 2017 13:57) (98% - 100%)        REVIEW OF SYSTEMS:    Constitutional: No fever, weight loss or fatigue  Eyes: No eye pain, visual disturbances, or discharge  ENT:  No difficulty hearing, tinnitus, vertigo; No sinus or throat pain  Neck: No pain or stiffness  Respiratory: No cough, wheezing, chills or hemoptysis  Cardiovascular: No chest pain, palpitations, shortness of breath, dizziness or leg swelling  Gastrointestinal: No abdominal or epigastric pain. No nausea, vomiting or hematemesis; No diarrhea or constipation. No melena or hematochezia.  Genitourinary: No dysuria, frequency, hematuria or incontinence  Neurological: No headaches, memory loss, loss of strength, numbness or tremors  Psychiatric: No depression, anxiety, mood swings or difficulty sleeping  Musculoskeletal: No joint pain or swelling; No muscle, back or extremity pain  Skin: No itching, burning, rashes or lesions   Lymph Nodes: No enlarged glands  Endocrine: No heat or cold intolerance; No hair loss, No h/o diabetes or thyroid dysfunction  Allergy and Immunologic: No hives or eczema    On Neurological Examination:    Mental Status - Pt is alert, awake, oriented X3.     Speech -  Normal.     Cranial Nerves - Pupils 3 mm equal and reactive to light, extraocular eye movements intact. Pt has no visual field deficit.  Pt has no facial asymmetry. Facial sensation is intact.Tongue - is in midline.  .      Motor Exam - 5/5 except hip and shoulder 4/5  .    Gait - Able to stand and walk unassisted.          coordination:    Finger to nose: normal.    Deep tendon Reflexes - 2 plus all over.        Romberg - Negative.    Neck Supple -  Yes.     MEDICATIONS    acetaminophen   Tablet 650 milliGRAM(s) Oral every 6 hours PRN  ascorbic acid 1000 milliGRAM(s) Oral daily  calcium carbonate  625 mG + Vitamin D (OsCal 250 + D) 1 Tablet(s) Oral two times a day  cefTRIAXone   IVPB 1 Gram(s) IV Intermittent every 24 hours  cefTRIAXone   IVPB      cholecalciferol 2000 Unit(s) Oral daily  dabigatran 150 milliGRAM(s) Oral every 12 hours  diltiazem    milliGRAM(s) Oral <User Schedule>  docusate sodium 100 milliGRAM(s) Oral at bedtime  ferrous    sulfate 325 milliGRAM(s) Oral daily  ganciclovir 0.15% Ophthalmic Gel 1 Drop(s) Right EYE daily  latanoprost 0.005% Ophthalmic Solution 1 Drop(s) Both EYES at bedtime  mirtazapine 7.5 milliGRAM(s) Oral at bedtime  multivitamin/minerals 1 Tablet(s) Oral daily  mycophenolate mofetil 1000 milliGRAM(s) Oral two times a day  omega-3-Acid Ethyl Esters 2 Gram(s) Oral two times a day  oxyCODONE    5 mG/acetaminophen 325 mG 1 Tablet(s) Oral every 6 hours PRN  predniSONE   Tablet 40 milliGRAM(s) Oral daily  pyridostigmine 60 milliGRAM(s) Oral <User Schedule>  pyridostigmine  milliGRAM(s) Oral at bedtime  senna 1 Tablet(s) Oral at bedtime  tamsulosin 0.4 milliGRAM(s) Oral at bedtime      Allergies    immune globulin intravenous (Unknown)  penicillins (Rash)    Intolerances        LABS:  CBC Full  -  ( 06 Dec 2017 06:52 )  WBC Count : 11.5 K/uL  Hemoglobin : 12.4 g/dL  Hematocrit : 39.3 %  Platelet Count - Automated : 208 K/uL  Mean Cell Volume : 96.1 fl  Mean Cell Hemoglobin : 30.4 pg  Mean Cell Hemoglobin Concentration : 31.6 gm/dL        12-06    144  |  109<H>  |  14  ----------------------------<  80  4.1   |  30  |  0.79    Ca    8.3<L>      06 Dec 2017 06:52      Hemoglobin A1C:     Vitamin B12     RADIOLOGY    ASSESSMENT AND PLAN:      UTI.  MG STABLE     NEUR WISE STABLE FOR DC.  CONTINUE MESTINONE/CELLCEPT AND STEROID

## 2017-12-06 NOTE — PROGRESS NOTE ADULT - ASSESSMENT
72 y/o M PMHx of HLD, A-fib, MG (4/2016) on prednisone, TIA (2006), urinary retention, sleep apnea no CPAP, glaucoma, herpes virus of right eye, anxiety, iron deficiency anemia admitted with sepsis likely 2/2 UTI, with blood cultures + E.coli
72 yo male with history of myasthenia gravis who presents with abrupt onset of fever and shaking chills this am along with urinary retention.  UTI most likely diagnosis  Antibiotic selection limited not only by sensitivity of the isolate, but also his M. gravis.
70 yo male with history of myasthenia gravis who presents with abrupt onset of fever and shaking chills this am along with urinary retention.  UTI most likely diagnosis  F/U Cx NTD.  Afebrile/HD stable  Minimal leukocytosis, likely from steroids  No concern of uncontrolled infections on exam  Isolate is senstitve to bactrim, which has excellent oral bioavailability and is statistically unliklely to trigger a myasthenic crisis  R/B/A of Bactrim discussed with the patient and his wife  All Q's answered

## 2017-12-06 NOTE — PROGRESS NOTE ADULT - SUBJECTIVE AND OBJECTIVE BOX
[facility], Division of Infectious Diseases  BENNY Millan A. Lee    Name: SAL SUTTON  Age: 72y  Gender: Male  MRN: [id number]    Interval History--  Notes reviewed    Past Medical History--  Herpes virus disease  Sleep apnea  Glaucoma  Urinary retention  Osteopenia  Benign prostatic hyperplasia, presence of lower urinary tract symptoms unspecified, unspecified morphology  Nasal polyp  Iron deficiency anemia  Atrial fibrillation, unspecified type  Myasthenia gravis  TIA (transient ischemic attack)  GERD (gastroesophageal reflux disease)  HLD (hyperlipidemia)  H/O coronary angiogram  H/O nasal polypectomy  Other cataract  No significant past surgical history      For details regarding the patient's social history, family history, and other miscellaneous elements, please refer the initial infectious diseases consultation and/or the admitting history and physical examination for this admission.    Allergies    immune globulin intravenous (Unknown)  penicillins (Rash)    Intolerances        Medications--  Antibiotics:  cefTRIAXone   IVPB 1 Gram(s) IV Intermittent every 24 hours  cefTRIAXone   IVPB        Immunologic:  mycophenolate mofetil 1000 milliGRAM(s) Oral two times a day    Other:  acetaminophen   Tablet PRN  ascorbic acid  calcium carbonate  625 mG + Vitamin D (OsCal 250 + D)  cholecalciferol  dabigatran  diltiazem   CD  docusate sodium  ferrous    sulfate  ganciclovir 0.15% Ophthalmic Gel  latanoprost 0.005% Ophthalmic Solution  mirtazapine  multivitamin/minerals  omega-3-Acid Ethyl Esters  oxyCODONE    5 mG/acetaminophen 325 mG PRN  predniSONE   Tablet  pyridostigmine  pyridostigmine SR  senna  tamsulosin      Review of Systems--  A 10-point review of systems was obtained.     Pertinent positives and negatives--  Constitutional: No fevers. No Chills. No Rigors.   Cardiovascular: No chest pain. No palpitations.  Respiratory: No shortness of breath. No cough.  Gastrointestinal: No nausea or vomiting. No diarrhea or constipation.   Psychiatric: Pleasant. Appropriate affect.    Review of systems otherwise negative except as previously noted.    Physical Examination--  Vital Signs: T(F): 98.5 (12-06-17 @ 05:24), Max: 98.5 (12-06-17 @ 05:24)  HR: 60 (12-06-17 @ 05:24)  BP: 122/70 (12-06-17 @ 05:24)  RR: 17 (12-06-17 @ 05:24)  SpO2: 100% (12-06-17 @ 05:24)  Wt(kg): --  General: Nontoxic-appearing Male in no acute distress.  HEENT: AT/NC. PERRL. EOMI. Anicteric. Conjunctiva pink and moist. Oropharynx clear. Dentition fair.  Neck: Not rigid. No sense of mass.  Nodes: None palpable.  Lungs: Clear bilaterally without rales, wheezing or rhonchi  Heart: Regular rate and rhythm. No Murmur. No rub. No gallop. No palpable thrill.  Abdomen: Bowel sounds present and normoactive. Soft. Nondistended. Nontender. No sense of mass. No organomegaly.  Back: No spinal tenderness. No costovertebral angle tenderness.   Extremities: No cyanosis or clubbing. No edema.   Skin: Warm. Dry. Good turgor. No rash. No vasculitic stigmata.  Psychiatric: Appropriate affect and mood for situation.         Laboratory Studies--  CBC                        12.4   11.5  )-----------( 208      ( 06 Dec 2017 06:52 )             39.3       Chemistries  12-06    144  |  109<H>  |  14  ----------------------------<  80  4.1   |  30  |  0.79    Ca    8.3<L>      06 Dec 2017 06:52        Culture Data  Culture - Blood (12.04.17 @ 12:15)    Specimen Source: .Blood Blood-Peripheral    Culture Results:   No growth to date.    Culture - Blood (12.04.17 @ 12:15)    Specimen Source: .Blood Blood-Peripheral    Culture Results:   No growth to date.    Culture - Urine (12.03.17 @ 13:53)    -  Amikacin: S <=8    -  Ampicillin: R >16    -  Ampicillin/Sulbactam: I 16/8    -  Aztreonam: S <=4    -  Cefazolin: S <=2    -  Cefepime: S <=2    -  Cefoxitin: S <=4    -  Ceftazidime: S <=1    -  Ceftriaxone: S <=1    -  Ciprofloxacin: S <=0.5    -  Ertapenem: S <=0.5    -  Gentamicin: S 2    -  Imipenem: S <=1    -  Levofloxacin: S <=1    -  Meropenem: S <=1    -  Nitrofurantoin: S <=32    -  Piperacillin/Tazobactam: S <=8    -  Tobramycin: S <=2    -  Trimethoprim/Sulfamethoxazole: S <=0.5/9.5    Specimen Source: .Urine Clean Catch (Midstream)    Culture Results:   >100,000 CFU/ml Escherichia coli    Organism Identification: Escherichia coli    Organism: Escherichia coli    Method Type: JOE    Culture - Blood (12.03.17 @ 13:52)    Gram Stain:   Growth in aerobic bottle:  Gram Negative Rods  Growth in anaerobic bottle: Gram Negative Rods    Specimen Source: .Blood Blood-Peripheral    Culture Results:   Growth in aerobic and anaerobic bottles: Escherichia coli  See previous culture [id number]    Culture - Blood (12.03.17 @ 13:52)    -  Escherichia coli: Detec    -  Amikacin: S <=8    -  Ampicillin: R >16    Gram Stain:   Growth in aerobic bottle: Gram Negative Rods  Growth in anaerobic bottle: Gram Negative Rods    -  Trimethoprim/Sulfamethoxazole: S <=0.5/9.5    -  Tobramycin: S <=2    -  Piperacillin/Tazobactam: S <=8    -  Meropenem: S <=1    -  Levofloxacin: S <=1    -  Imipenem: S <=1    -  Gentamicin: S <=1    -  Ertapenem: S <=0.5    -  Ciprofloxacin: S <=0.5    -  Ceftriaxone: S <=1    -  Ceftazidime: S <=1    -  Cefoxitin: S <=4    -  Cefepime: S <=2    -  Cefazolin: S <=2    -  Ampicillin/Sulbactam: I 16/8    -  Aztreonam: S <=4    Specimen Source: .Blood Blood-Peripheral    Organism: Blood Culture PCR    Organism: Escherichia coli    Culture Results:   Growth in aerobic and anaerobic bottles: Escherichia coli  ***Blood Panel PCR results on this specimen are available  approximately 3 hours after the Gram stain result.***  Gram stain, PCR, and/or culture results may not always  correspond due to difference in methodologies.  ************************************************************  This PCR assay was performed using [employer].  The following targets are tested for: Enterococcus,  vancomycin resistant enterococci, Listeria monocytogenes,  coagulase negative staphylococci, S. aureus,  methicillin resistant S. aureus, Streptococcus agalactiae  (Group B), S. pneumoniae, S. pyogenes (Group A),  Acinetobacter baumannii, Enterobacter cloacae, E. coli,  Klebsiella oxytoca, K. pneumoniae, Proteus sp.,  Serratia marcescens, Haemophilus influenzae,  Neisseria meningitidis, Pseudomonas aeruginosa, Candida  albicans, C. glabrata, C krusei, C parapsilosis,  C. tropicalis and the KPC resistance gene.    Organism Identification: Blood Culture PCR  Escherichia coli    Method Type: PCR    Method Type: JOE

## 2017-12-06 NOTE — PROGRESS NOTE ADULT - ATTENDING COMMENTS
Pt. seen and evaluated for sepsis 2/2 E. coli UTI and bacteremia.  Pt. feeling well today.  Tolerating IV antibiotics.  Physical examination as above.    Plan:  -Sepsis secondary to E. coli UTI and bacteremia:  Continue Ceftriaxone.  Check final blood cx results and sensitivities.  Check repeat blood cx NGTD.  ID f/u  -Myasthenia Gravis: Continue Prednisone 40mg PO daily, Cellcept, and Mestinon.  Neurology f/u  -Afib: rate controlled on CArdizem CD 180mg PO daily and Pradaxa.  -Urinary retention:  Pt. voiding on his own.  Continue flomax.    -Herpes ophthalmicus:  continue ganciclovir eye drop daily.  -VTE ppx: On Pradaxa .
Thank you for the courtesy of this referral.    I'll sign off at this time.     Emmanuel De Souza MD  [phone]
Emmanuel De Souza MD  [phone]
Pt. seen and evaluated for sepsis 2/2 gram negative abhishek bacteremia.  Pt. is feeling much better today.  Tolerating IV antibiotics.  Physical examination as above.     Plan:  -Sepsis secondary to E. coli UTI and bacteremia:  Continue Ceftriaxone.  Check final blood cx results and sensitivities.  Check repeat blood cx.  ID f/u  -Myasthenia Gravis: Continue Prednisone 40mg PO daily, Cellcept, and Mestinon.  Neurology f/u  -Afib: rate controlled on CArdizem CD 180mg PO daily and Pradaxa.  -Urinary retention:  Pt. voiding on his own.  Continue flomax.    -Herpes ophthalmicus:  continue ganciclovir eye drop daily.  -VTE ppx: On Pradaxa

## 2017-12-09 ENCOUNTER — OUTPATIENT (OUTPATIENT)
Dept: OUTPATIENT SERVICES | Facility: HOSPITAL | Age: 72
LOS: 1 days | End: 2017-12-09
Payer: MEDICARE

## 2017-12-09 DIAGNOSIS — Z00.00 ENCOUNTER FOR GENERAL ADULT MEDICAL EXAMINATION WITHOUT ABNORMAL FINDINGS: ICD-10-CM

## 2017-12-09 DIAGNOSIS — Z98.890 OTHER SPECIFIED POSTPROCEDURAL STATES: Chronic | ICD-10-CM

## 2017-12-09 DIAGNOSIS — G70.00 MYASTHENIA GRAVIS WITHOUT (ACUTE) EXACERBATION: ICD-10-CM

## 2017-12-09 DIAGNOSIS — H26.8 OTHER SPECIFIED CATARACT: Chronic | ICD-10-CM

## 2017-12-09 LAB
CULTURE RESULTS: SIGNIFICANT CHANGE UP
CULTURE RESULTS: SIGNIFICANT CHANGE UP
HCT VFR BLD CALC: 39.2 % — SIGNIFICANT CHANGE UP (ref 39–50)
HGB BLD-MCNC: 13.2 G/DL — SIGNIFICANT CHANGE UP (ref 13–17)
MCHC RBC-ENTMCNC: 33.1 PG — SIGNIFICANT CHANGE UP (ref 27–34)
MCHC RBC-ENTMCNC: 33.8 GM/DL — SIGNIFICANT CHANGE UP (ref 32–36)
MCV RBC AUTO: 98 FL — SIGNIFICANT CHANGE UP (ref 80–100)
PLATELET # BLD AUTO: 243 K/UL — SIGNIFICANT CHANGE UP (ref 150–400)
RBC # BLD: 4 M/UL — LOW (ref 4.2–5.8)
RBC # FLD: 13.7 % — SIGNIFICANT CHANGE UP (ref 10.3–14.5)
SPECIMEN SOURCE: SIGNIFICANT CHANGE UP
SPECIMEN SOURCE: SIGNIFICANT CHANGE UP
WBC # BLD: 15.4 K/UL — HIGH (ref 3.8–10.5)
WBC # FLD AUTO: 15.4 K/UL — HIGH (ref 3.8–10.5)

## 2017-12-09 PROCEDURE — 85027 COMPLETE CBC AUTOMATED: CPT

## 2017-12-09 PROCEDURE — 36514 APHERESIS PLASMA: CPT

## 2017-12-09 PROCEDURE — P9041: CPT

## 2017-12-09 PROCEDURE — 99214 OFFICE O/P EST MOD 30 MIN: CPT | Mod: 25

## 2017-12-11 ENCOUNTER — OUTPATIENT (OUTPATIENT)
Dept: OUTPATIENT SERVICES | Facility: HOSPITAL | Age: 72
LOS: 1 days | End: 2017-12-11
Payer: MEDICARE

## 2017-12-11 DIAGNOSIS — G70.00 MYASTHENIA GRAVIS WITHOUT (ACUTE) EXACERBATION: ICD-10-CM

## 2017-12-11 DIAGNOSIS — Z98.890 OTHER SPECIFIED POSTPROCEDURAL STATES: Chronic | ICD-10-CM

## 2017-12-11 DIAGNOSIS — H26.8 OTHER SPECIFIED CATARACT: Chronic | ICD-10-CM

## 2017-12-11 LAB
HCT VFR BLD CALC: 38.1 % — LOW (ref 39–50)
HGB BLD-MCNC: 12.8 G/DL — LOW (ref 13–17)
MCHC RBC-ENTMCNC: 32.7 PG — SIGNIFICANT CHANGE UP (ref 27–34)
MCHC RBC-ENTMCNC: 33.6 GM/DL — SIGNIFICANT CHANGE UP (ref 32–36)
MCV RBC AUTO: 97.3 FL — SIGNIFICANT CHANGE UP (ref 80–100)
PLATELET # BLD AUTO: 239 K/UL — SIGNIFICANT CHANGE UP (ref 150–400)
RBC # BLD: 3.92 M/UL — LOW (ref 4.2–5.8)
RBC # FLD: 13.6 % — SIGNIFICANT CHANGE UP (ref 10.3–14.5)
WBC # BLD: 13.7 K/UL — HIGH (ref 3.8–10.5)
WBC # FLD AUTO: 13.7 K/UL — HIGH (ref 3.8–10.5)

## 2017-12-11 PROCEDURE — P9041: CPT

## 2017-12-11 PROCEDURE — 99214 OFFICE O/P EST MOD 30 MIN: CPT | Mod: 25

## 2017-12-11 PROCEDURE — 36514 APHERESIS PLASMA: CPT

## 2017-12-11 PROCEDURE — 85027 COMPLETE CBC AUTOMATED: CPT

## 2017-12-13 ENCOUNTER — OUTPATIENT (OUTPATIENT)
Dept: OUTPATIENT SERVICES | Facility: HOSPITAL | Age: 72
LOS: 1 days | End: 2017-12-13
Payer: MEDICARE

## 2017-12-13 DIAGNOSIS — H26.8 OTHER SPECIFIED CATARACT: Chronic | ICD-10-CM

## 2017-12-13 DIAGNOSIS — Z98.890 OTHER SPECIFIED POSTPROCEDURAL STATES: Chronic | ICD-10-CM

## 2017-12-13 DIAGNOSIS — G70.00 MYASTHENIA GRAVIS WITHOUT (ACUTE) EXACERBATION: ICD-10-CM

## 2017-12-13 LAB
HCT VFR BLD CALC: 39.4 % — SIGNIFICANT CHANGE UP (ref 39–50)
HGB BLD-MCNC: 13.5 G/DL — SIGNIFICANT CHANGE UP (ref 13–17)
MCHC RBC-ENTMCNC: 33.3 PG — SIGNIFICANT CHANGE UP (ref 27–34)
MCHC RBC-ENTMCNC: 34.2 GM/DL — SIGNIFICANT CHANGE UP (ref 32–36)
MCV RBC AUTO: 97.2 FL — SIGNIFICANT CHANGE UP (ref 80–100)
PLATELET # BLD AUTO: 289 K/UL — SIGNIFICANT CHANGE UP (ref 150–400)
RBC # BLD: 4.05 M/UL — LOW (ref 4.2–5.8)
RBC # FLD: 13.8 % — SIGNIFICANT CHANGE UP (ref 10.3–14.5)
WBC # BLD: 18.9 K/UL — HIGH (ref 3.8–10.5)
WBC # FLD AUTO: 18.9 K/UL — HIGH (ref 3.8–10.5)

## 2017-12-13 PROCEDURE — 99214 OFFICE O/P EST MOD 30 MIN: CPT | Mod: 25

## 2017-12-13 PROCEDURE — 36514 APHERESIS PLASMA: CPT

## 2017-12-13 PROCEDURE — 85027 COMPLETE CBC AUTOMATED: CPT

## 2017-12-13 PROCEDURE — P9041: CPT

## 2017-12-19 PROCEDURE — 81001 URINALYSIS AUTO W/SCOPE: CPT

## 2017-12-19 PROCEDURE — 93005 ELECTROCARDIOGRAM TRACING: CPT

## 2017-12-19 PROCEDURE — 85730 THROMBOPLASTIN TIME PARTIAL: CPT

## 2017-12-19 PROCEDURE — 87186 SC STD MICRODIL/AGAR DIL: CPT

## 2017-12-19 PROCEDURE — 87486 CHLMYD PNEUM DNA AMP PROBE: CPT

## 2017-12-19 PROCEDURE — 83605 ASSAY OF LACTIC ACID: CPT

## 2017-12-19 PROCEDURE — 85027 COMPLETE CBC AUTOMATED: CPT

## 2017-12-19 PROCEDURE — 87040 BLOOD CULTURE FOR BACTERIA: CPT

## 2017-12-19 PROCEDURE — 87798 DETECT AGENT NOS DNA AMP: CPT

## 2017-12-19 PROCEDURE — 80053 COMPREHEN METABOLIC PANEL: CPT

## 2017-12-19 PROCEDURE — 85610 PROTHROMBIN TIME: CPT

## 2017-12-19 PROCEDURE — 71045 X-RAY EXAM CHEST 1 VIEW: CPT

## 2017-12-19 PROCEDURE — 80048 BASIC METABOLIC PNL TOTAL CA: CPT

## 2017-12-19 PROCEDURE — 87581 M.PNEUMON DNA AMP PROBE: CPT

## 2017-12-19 PROCEDURE — 87086 URINE CULTURE/COLONY COUNT: CPT

## 2017-12-19 PROCEDURE — 97161 PT EVAL LOW COMPLEX 20 MIN: CPT

## 2017-12-19 PROCEDURE — 96365 THER/PROPH/DIAG IV INF INIT: CPT

## 2017-12-19 PROCEDURE — 87633 RESP VIRUS 12-25 TARGETS: CPT

## 2017-12-19 PROCEDURE — 99285 EMERGENCY DEPT VISIT HI MDM: CPT | Mod: 25

## 2017-12-19 PROCEDURE — 87150 DNA/RNA AMPLIFIED PROBE: CPT

## 2018-01-03 ENCOUNTER — OUTPATIENT (OUTPATIENT)
Dept: OUTPATIENT SERVICES | Facility: HOSPITAL | Age: 73
LOS: 1 days | End: 2018-01-03
Payer: MEDICARE

## 2018-01-03 DIAGNOSIS — G70.00 MYASTHENIA GRAVIS WITHOUT (ACUTE) EXACERBATION: ICD-10-CM

## 2018-01-03 DIAGNOSIS — Z98.890 OTHER SPECIFIED POSTPROCEDURAL STATES: Chronic | ICD-10-CM

## 2018-01-03 DIAGNOSIS — H26.8 OTHER SPECIFIED CATARACT: Chronic | ICD-10-CM

## 2018-01-03 LAB
HCT VFR BLD CALC: 38.5 % — LOW (ref 39–50)
HGB BLD-MCNC: 13.1 G/DL — SIGNIFICANT CHANGE UP (ref 13–17)
MCHC RBC-ENTMCNC: 33.2 PG — SIGNIFICANT CHANGE UP (ref 27–34)
MCHC RBC-ENTMCNC: 34 GM/DL — SIGNIFICANT CHANGE UP (ref 32–36)
MCV RBC AUTO: 97.7 FL — SIGNIFICANT CHANGE UP (ref 80–100)
PLATELET # BLD AUTO: 237 K/UL — SIGNIFICANT CHANGE UP (ref 150–400)
RBC # BLD: 3.94 M/UL — LOW (ref 4.2–5.8)
RBC # FLD: 13.9 % — SIGNIFICANT CHANGE UP (ref 10.3–14.5)
WBC # BLD: 13.9 K/UL — HIGH (ref 3.8–10.5)
WBC # FLD AUTO: 13.9 K/UL — HIGH (ref 3.8–10.5)

## 2018-01-03 PROCEDURE — P9041: CPT

## 2018-01-03 PROCEDURE — 99214 OFFICE O/P EST MOD 30 MIN: CPT | Mod: 25

## 2018-01-03 PROCEDURE — 85027 COMPLETE CBC AUTOMATED: CPT

## 2018-01-03 PROCEDURE — 36514 APHERESIS PLASMA: CPT

## 2018-01-06 ENCOUNTER — OUTPATIENT (OUTPATIENT)
Dept: OUTPATIENT SERVICES | Facility: HOSPITAL | Age: 73
LOS: 1 days | End: 2018-01-06
Payer: MEDICARE

## 2018-01-06 DIAGNOSIS — Z98.890 OTHER SPECIFIED POSTPROCEDURAL STATES: Chronic | ICD-10-CM

## 2018-01-06 DIAGNOSIS — G70.00 MYASTHENIA GRAVIS WITHOUT (ACUTE) EXACERBATION: ICD-10-CM

## 2018-01-06 DIAGNOSIS — H26.8 OTHER SPECIFIED CATARACT: Chronic | ICD-10-CM

## 2018-01-06 LAB
HCT VFR BLD CALC: 40.4 % — SIGNIFICANT CHANGE UP (ref 39–50)
HGB BLD-MCNC: 13.6 G/DL — SIGNIFICANT CHANGE UP (ref 13–17)
MCHC RBC-ENTMCNC: 32.7 PG — SIGNIFICANT CHANGE UP (ref 27–34)
MCHC RBC-ENTMCNC: 33.6 GM/DL — SIGNIFICANT CHANGE UP (ref 32–36)
MCV RBC AUTO: 97.4 FL — SIGNIFICANT CHANGE UP (ref 80–100)
PLATELET # BLD AUTO: 240 K/UL — SIGNIFICANT CHANGE UP (ref 150–400)
RBC # BLD: 4.14 M/UL — LOW (ref 4.2–5.8)
RBC # FLD: 14 % — SIGNIFICANT CHANGE UP (ref 10.3–14.5)
WBC # BLD: 16.3 K/UL — HIGH (ref 3.8–10.5)
WBC # FLD AUTO: 16.3 K/UL — HIGH (ref 3.8–10.5)

## 2018-01-06 PROCEDURE — 36514 APHERESIS PLASMA: CPT

## 2018-01-06 PROCEDURE — 99214 OFFICE O/P EST MOD 30 MIN: CPT | Mod: 25

## 2018-01-06 PROCEDURE — P9041: CPT

## 2018-01-06 PROCEDURE — 85027 COMPLETE CBC AUTOMATED: CPT

## 2018-01-08 ENCOUNTER — OUTPATIENT (OUTPATIENT)
Dept: OUTPATIENT SERVICES | Facility: HOSPITAL | Age: 73
LOS: 1 days | End: 2018-01-08
Payer: MEDICARE

## 2018-01-08 DIAGNOSIS — G70.00 MYASTHENIA GRAVIS WITHOUT (ACUTE) EXACERBATION: ICD-10-CM

## 2018-01-08 DIAGNOSIS — H26.8 OTHER SPECIFIED CATARACT: Chronic | ICD-10-CM

## 2018-01-08 DIAGNOSIS — Z98.890 OTHER SPECIFIED POSTPROCEDURAL STATES: Chronic | ICD-10-CM

## 2018-01-08 LAB
HCT VFR BLD CALC: 38.9 % — LOW (ref 39–50)
HGB BLD-MCNC: 13.6 G/DL — SIGNIFICANT CHANGE UP (ref 13–17)
MCHC RBC-ENTMCNC: 33.9 PG — SIGNIFICANT CHANGE UP (ref 27–34)
MCHC RBC-ENTMCNC: 34.8 GM/DL — SIGNIFICANT CHANGE UP (ref 32–36)
MCV RBC AUTO: 97.3 FL — SIGNIFICANT CHANGE UP (ref 80–100)
PLATELET # BLD AUTO: 214 K/UL — SIGNIFICANT CHANGE UP (ref 150–400)
RBC # BLD: 4 M/UL — LOW (ref 4.2–5.8)
RBC # FLD: 14 % — SIGNIFICANT CHANGE UP (ref 10.3–14.5)
WBC # BLD: 14.9 K/UL — HIGH (ref 3.8–10.5)
WBC # FLD AUTO: 14.9 K/UL — HIGH (ref 3.8–10.5)

## 2018-01-08 PROCEDURE — 99214 OFFICE O/P EST MOD 30 MIN: CPT | Mod: 25

## 2018-01-08 PROCEDURE — 36514 APHERESIS PLASMA: CPT

## 2018-01-08 PROCEDURE — P9041: CPT

## 2018-01-08 PROCEDURE — 85027 COMPLETE CBC AUTOMATED: CPT

## 2018-01-29 ENCOUNTER — OUTPATIENT (OUTPATIENT)
Dept: OUTPATIENT SERVICES | Facility: HOSPITAL | Age: 73
LOS: 1 days | End: 2018-01-29
Payer: MEDICARE

## 2018-01-29 DIAGNOSIS — H26.8 OTHER SPECIFIED CATARACT: Chronic | ICD-10-CM

## 2018-01-29 DIAGNOSIS — Z98.890 OTHER SPECIFIED POSTPROCEDURAL STATES: Chronic | ICD-10-CM

## 2018-01-29 DIAGNOSIS — G70.00 MYASTHENIA GRAVIS WITHOUT (ACUTE) EXACERBATION: ICD-10-CM

## 2018-01-29 LAB
HCT VFR BLD CALC: 41 % — SIGNIFICANT CHANGE UP (ref 39–50)
HGB BLD-MCNC: 13.6 G/DL — SIGNIFICANT CHANGE UP (ref 13–17)
MCHC RBC-ENTMCNC: 32.1 PG — SIGNIFICANT CHANGE UP (ref 27–34)
MCHC RBC-ENTMCNC: 33 GM/DL — SIGNIFICANT CHANGE UP (ref 32–36)
MCV RBC AUTO: 97.3 FL — SIGNIFICANT CHANGE UP (ref 80–100)
PLATELET # BLD AUTO: 231 K/UL — SIGNIFICANT CHANGE UP (ref 150–400)
RBC # BLD: 4.22 M/UL — SIGNIFICANT CHANGE UP (ref 4.2–5.8)
RBC # FLD: 13.7 % — SIGNIFICANT CHANGE UP (ref 10.3–14.5)
WBC # BLD: 16.2 K/UL — HIGH (ref 3.8–10.5)
WBC # FLD AUTO: 16.2 K/UL — HIGH (ref 3.8–10.5)

## 2018-01-29 PROCEDURE — 36514 APHERESIS PLASMA: CPT

## 2018-01-29 PROCEDURE — 85027 COMPLETE CBC AUTOMATED: CPT

## 2018-01-29 PROCEDURE — P9041: CPT

## 2018-01-29 PROCEDURE — P9045: CPT

## 2018-01-29 PROCEDURE — 99214 OFFICE O/P EST MOD 30 MIN: CPT | Mod: 25

## 2018-01-31 ENCOUNTER — OUTPATIENT (OUTPATIENT)
Dept: OUTPATIENT SERVICES | Facility: HOSPITAL | Age: 73
LOS: 1 days | End: 2018-01-31
Payer: MEDICARE

## 2018-01-31 DIAGNOSIS — Z98.890 OTHER SPECIFIED POSTPROCEDURAL STATES: Chronic | ICD-10-CM

## 2018-01-31 DIAGNOSIS — H26.8 OTHER SPECIFIED CATARACT: Chronic | ICD-10-CM

## 2018-01-31 DIAGNOSIS — G70.00 MYASTHENIA GRAVIS WITHOUT (ACUTE) EXACERBATION: ICD-10-CM

## 2018-01-31 LAB
HCT VFR BLD CALC: 41.1 % — SIGNIFICANT CHANGE UP (ref 39–50)
HGB BLD-MCNC: 13.9 G/DL — SIGNIFICANT CHANGE UP (ref 13–17)
MCHC RBC-ENTMCNC: 32.8 PG — SIGNIFICANT CHANGE UP (ref 27–34)
MCHC RBC-ENTMCNC: 33.8 GM/DL — SIGNIFICANT CHANGE UP (ref 32–36)
MCV RBC AUTO: 96.9 FL — SIGNIFICANT CHANGE UP (ref 80–100)
PLATELET # BLD AUTO: 262 K/UL — SIGNIFICANT CHANGE UP (ref 150–400)
RBC # BLD: 4.24 M/UL — SIGNIFICANT CHANGE UP (ref 4.2–5.8)
RBC # FLD: 13.7 % — SIGNIFICANT CHANGE UP (ref 10.3–14.5)
WBC # BLD: 15.5 K/UL — HIGH (ref 3.8–10.5)
WBC # FLD AUTO: 15.5 K/UL — HIGH (ref 3.8–10.5)

## 2018-01-31 PROCEDURE — 85027 COMPLETE CBC AUTOMATED: CPT

## 2018-01-31 PROCEDURE — P9045: CPT

## 2018-01-31 PROCEDURE — 99214 OFFICE O/P EST MOD 30 MIN: CPT | Mod: 25

## 2018-01-31 PROCEDURE — 36514 APHERESIS PLASMA: CPT

## 2018-01-31 PROCEDURE — P9041: CPT

## 2018-02-02 ENCOUNTER — OUTPATIENT (OUTPATIENT)
Dept: OUTPATIENT SERVICES | Facility: HOSPITAL | Age: 73
LOS: 1 days | End: 2018-02-02
Payer: MEDICARE

## 2018-02-02 DIAGNOSIS — G70.00 MYASTHENIA GRAVIS WITHOUT (ACUTE) EXACERBATION: ICD-10-CM

## 2018-02-02 DIAGNOSIS — Z98.890 OTHER SPECIFIED POSTPROCEDURAL STATES: Chronic | ICD-10-CM

## 2018-02-02 DIAGNOSIS — H26.8 OTHER SPECIFIED CATARACT: Chronic | ICD-10-CM

## 2018-02-02 LAB
HCT VFR BLD CALC: 39.9 % — SIGNIFICANT CHANGE UP (ref 39–50)
HGB BLD-MCNC: 13.4 G/DL — SIGNIFICANT CHANGE UP (ref 13–17)
MCHC RBC-ENTMCNC: 32.3 PG — SIGNIFICANT CHANGE UP (ref 27–34)
MCHC RBC-ENTMCNC: 33.5 GM/DL — SIGNIFICANT CHANGE UP (ref 32–36)
MCV RBC AUTO: 96.2 FL — SIGNIFICANT CHANGE UP (ref 80–100)
PLATELET # BLD AUTO: 250 K/UL — SIGNIFICANT CHANGE UP (ref 150–400)
RBC # BLD: 4.14 M/UL — LOW (ref 4.2–5.8)
RBC # FLD: 13.8 % — SIGNIFICANT CHANGE UP (ref 10.3–14.5)
WBC # BLD: 16.1 K/UL — HIGH (ref 3.8–10.5)
WBC # FLD AUTO: 16.1 K/UL — HIGH (ref 3.8–10.5)

## 2018-02-02 PROCEDURE — 99214 OFFICE O/P EST MOD 30 MIN: CPT | Mod: 25

## 2018-02-02 PROCEDURE — 85027 COMPLETE CBC AUTOMATED: CPT

## 2018-02-02 PROCEDURE — P9041: CPT

## 2018-02-02 PROCEDURE — 36514 APHERESIS PLASMA: CPT

## 2018-02-23 ENCOUNTER — OUTPATIENT (OUTPATIENT)
Dept: OUTPATIENT SERVICES | Facility: HOSPITAL | Age: 73
LOS: 1 days | End: 2018-02-23
Payer: MEDICARE

## 2018-02-23 DIAGNOSIS — Z98.890 OTHER SPECIFIED POSTPROCEDURAL STATES: Chronic | ICD-10-CM

## 2018-02-23 DIAGNOSIS — G70.00 MYASTHENIA GRAVIS WITHOUT (ACUTE) EXACERBATION: ICD-10-CM

## 2018-02-23 DIAGNOSIS — H26.8 OTHER SPECIFIED CATARACT: Chronic | ICD-10-CM

## 2018-02-23 LAB
HCT VFR BLD CALC: 40.5 % — SIGNIFICANT CHANGE UP (ref 39–50)
HGB BLD-MCNC: 13 G/DL — SIGNIFICANT CHANGE UP (ref 13–17)
MCHC RBC-ENTMCNC: 30.8 PG — SIGNIFICANT CHANGE UP (ref 27–34)
MCHC RBC-ENTMCNC: 32.1 GM/DL — SIGNIFICANT CHANGE UP (ref 32–36)
MCV RBC AUTO: 95.8 FL — SIGNIFICANT CHANGE UP (ref 80–100)
PLATELET # BLD AUTO: 273 K/UL — SIGNIFICANT CHANGE UP (ref 150–400)
RBC # BLD: 4.23 M/UL — SIGNIFICANT CHANGE UP (ref 4.2–5.8)
RBC # FLD: 13.7 % — SIGNIFICANT CHANGE UP (ref 10.3–14.5)
WBC # BLD: 13.2 K/UL — HIGH (ref 3.8–10.5)
WBC # FLD AUTO: 13.2 K/UL — HIGH (ref 3.8–10.5)

## 2018-02-23 PROCEDURE — 36514 APHERESIS PLASMA: CPT

## 2018-02-23 PROCEDURE — 36430 TRANSFUSION BLD/BLD COMPNT: CPT

## 2018-02-23 PROCEDURE — P9045: CPT

## 2018-02-23 PROCEDURE — 85027 COMPLETE CBC AUTOMATED: CPT

## 2018-02-23 PROCEDURE — 99214 OFFICE O/P EST MOD 30 MIN: CPT | Mod: 25

## 2018-02-23 PROCEDURE — P9041: CPT

## 2018-02-26 ENCOUNTER — OUTPATIENT (OUTPATIENT)
Dept: OUTPATIENT SERVICES | Facility: HOSPITAL | Age: 73
LOS: 1 days | End: 2018-02-26
Payer: MEDICARE

## 2018-02-26 DIAGNOSIS — G70.00 MYASTHENIA GRAVIS WITHOUT (ACUTE) EXACERBATION: ICD-10-CM

## 2018-02-26 DIAGNOSIS — H26.8 OTHER SPECIFIED CATARACT: Chronic | ICD-10-CM

## 2018-02-26 DIAGNOSIS — Z98.890 OTHER SPECIFIED POSTPROCEDURAL STATES: Chronic | ICD-10-CM

## 2018-02-26 LAB
HCT VFR BLD CALC: 39.4 % — SIGNIFICANT CHANGE UP (ref 39–50)
HGB BLD-MCNC: 13.4 G/DL — SIGNIFICANT CHANGE UP (ref 13–17)
MCHC RBC-ENTMCNC: 32.5 PG — SIGNIFICANT CHANGE UP (ref 27–34)
MCHC RBC-ENTMCNC: 33.9 GM/DL — SIGNIFICANT CHANGE UP (ref 32–36)
MCV RBC AUTO: 95.8 FL — SIGNIFICANT CHANGE UP (ref 80–100)
PLATELET # BLD AUTO: 277 K/UL — SIGNIFICANT CHANGE UP (ref 150–400)
RBC # BLD: 4.11 M/UL — LOW (ref 4.2–5.8)
RBC # FLD: 13.8 % — SIGNIFICANT CHANGE UP (ref 10.3–14.5)
WBC # BLD: 14.9 K/UL — HIGH (ref 3.8–10.5)
WBC # FLD AUTO: 14.9 K/UL — HIGH (ref 3.8–10.5)

## 2018-02-26 PROCEDURE — 85027 COMPLETE CBC AUTOMATED: CPT

## 2018-02-26 PROCEDURE — 36430 TRANSFUSION BLD/BLD COMPNT: CPT

## 2018-02-26 PROCEDURE — P9041: CPT

## 2018-02-26 PROCEDURE — 36514 APHERESIS PLASMA: CPT

## 2018-02-26 PROCEDURE — 99214 OFFICE O/P EST MOD 30 MIN: CPT | Mod: 25

## 2018-02-28 ENCOUNTER — OUTPATIENT (OUTPATIENT)
Dept: OUTPATIENT SERVICES | Facility: HOSPITAL | Age: 73
LOS: 1 days | End: 2018-02-28
Payer: MEDICARE

## 2018-02-28 DIAGNOSIS — G70.00 MYASTHENIA GRAVIS WITHOUT (ACUTE) EXACERBATION: ICD-10-CM

## 2018-02-28 DIAGNOSIS — Z98.890 OTHER SPECIFIED POSTPROCEDURAL STATES: Chronic | ICD-10-CM

## 2018-02-28 DIAGNOSIS — H26.8 OTHER SPECIFIED CATARACT: Chronic | ICD-10-CM

## 2018-02-28 LAB
HCT VFR BLD CALC: 42 % — SIGNIFICANT CHANGE UP (ref 39–50)
HGB BLD-MCNC: 13.7 G/DL — SIGNIFICANT CHANGE UP (ref 13–17)
MCHC RBC-ENTMCNC: 31.4 PG — SIGNIFICANT CHANGE UP (ref 27–34)
MCHC RBC-ENTMCNC: 32.7 GM/DL — SIGNIFICANT CHANGE UP (ref 32–36)
MCV RBC AUTO: 96.2 FL — SIGNIFICANT CHANGE UP (ref 80–100)
PLATELET # BLD AUTO: 271 K/UL — SIGNIFICANT CHANGE UP (ref 150–400)
RBC # BLD: 4.36 M/UL — SIGNIFICANT CHANGE UP (ref 4.2–5.8)
RBC # FLD: 14.1 % — SIGNIFICANT CHANGE UP (ref 10.3–14.5)
WBC # BLD: 15.8 K/UL — HIGH (ref 3.8–10.5)
WBC # FLD AUTO: 15.8 K/UL — HIGH (ref 3.8–10.5)

## 2018-02-28 PROCEDURE — 99214 OFFICE O/P EST MOD 30 MIN: CPT | Mod: 25

## 2018-02-28 PROCEDURE — P9045: CPT

## 2018-02-28 PROCEDURE — 36514 APHERESIS PLASMA: CPT

## 2018-02-28 PROCEDURE — P9041: CPT

## 2018-02-28 PROCEDURE — 85027 COMPLETE CBC AUTOMATED: CPT

## 2018-03-23 ENCOUNTER — OUTPATIENT (OUTPATIENT)
Dept: OUTPATIENT SERVICES | Facility: HOSPITAL | Age: 73
LOS: 1 days | End: 2018-03-23
Payer: MEDICARE

## 2018-03-23 DIAGNOSIS — Z98.890 OTHER SPECIFIED POSTPROCEDURAL STATES: Chronic | ICD-10-CM

## 2018-03-23 DIAGNOSIS — H26.8 OTHER SPECIFIED CATARACT: Chronic | ICD-10-CM

## 2018-03-23 DIAGNOSIS — G70.00 MYASTHENIA GRAVIS WITHOUT (ACUTE) EXACERBATION: ICD-10-CM

## 2018-03-23 LAB
HCT VFR BLD CALC: 40.6 % — SIGNIFICANT CHANGE UP (ref 39–50)
HGB BLD-MCNC: 13.6 G/DL — SIGNIFICANT CHANGE UP (ref 13–17)
MCHC RBC-ENTMCNC: 31.5 PG — SIGNIFICANT CHANGE UP (ref 27–34)
MCHC RBC-ENTMCNC: 33.5 GM/DL — SIGNIFICANT CHANGE UP (ref 32–36)
MCV RBC AUTO: 94.2 FL — SIGNIFICANT CHANGE UP (ref 80–100)
PLATELET # BLD AUTO: 295 K/UL — SIGNIFICANT CHANGE UP (ref 150–400)
RBC # BLD: 4.31 M/UL — SIGNIFICANT CHANGE UP (ref 4.2–5.8)
RBC # FLD: 14.1 % — SIGNIFICANT CHANGE UP (ref 10.3–14.5)
WBC # BLD: 14.2 K/UL — HIGH (ref 3.8–10.5)
WBC # FLD AUTO: 14.2 K/UL — HIGH (ref 3.8–10.5)

## 2018-03-23 PROCEDURE — 99214 OFFICE O/P EST MOD 30 MIN: CPT | Mod: 25

## 2018-03-23 PROCEDURE — 36514 APHERESIS PLASMA: CPT

## 2018-03-23 PROCEDURE — 85027 COMPLETE CBC AUTOMATED: CPT

## 2018-03-23 PROCEDURE — P9045: CPT

## 2018-03-23 PROCEDURE — P9041: CPT

## 2018-03-26 ENCOUNTER — OUTPATIENT (OUTPATIENT)
Dept: OUTPATIENT SERVICES | Facility: HOSPITAL | Age: 73
LOS: 1 days | End: 2018-03-26
Payer: MEDICARE

## 2018-03-26 DIAGNOSIS — G70.00 MYASTHENIA GRAVIS WITHOUT (ACUTE) EXACERBATION: ICD-10-CM

## 2018-03-26 DIAGNOSIS — Z98.890 OTHER SPECIFIED POSTPROCEDURAL STATES: Chronic | ICD-10-CM

## 2018-03-26 DIAGNOSIS — H26.8 OTHER SPECIFIED CATARACT: Chronic | ICD-10-CM

## 2018-03-26 LAB
HCT VFR BLD CALC: 43.6 % — SIGNIFICANT CHANGE UP (ref 39–50)
HGB BLD-MCNC: 14.5 G/DL — SIGNIFICANT CHANGE UP (ref 13–17)
MCHC RBC-ENTMCNC: 31.4 PG — SIGNIFICANT CHANGE UP (ref 27–34)
MCHC RBC-ENTMCNC: 33.3 GM/DL — SIGNIFICANT CHANGE UP (ref 32–36)
MCV RBC AUTO: 94.5 FL — SIGNIFICANT CHANGE UP (ref 80–100)
PLATELET # BLD AUTO: 324 K/UL — SIGNIFICANT CHANGE UP (ref 150–400)
RBC # BLD: 4.61 M/UL — SIGNIFICANT CHANGE UP (ref 4.2–5.8)
RBC # FLD: 14.1 % — SIGNIFICANT CHANGE UP (ref 10.3–14.5)
WBC # BLD: 18 K/UL — HIGH (ref 3.8–10.5)
WBC # FLD AUTO: 18 K/UL — HIGH (ref 3.8–10.5)

## 2018-03-26 PROCEDURE — 85027 COMPLETE CBC AUTOMATED: CPT

## 2018-03-26 PROCEDURE — 36514 APHERESIS PLASMA: CPT

## 2018-03-26 PROCEDURE — 99214 OFFICE O/P EST MOD 30 MIN: CPT | Mod: 25

## 2018-03-26 PROCEDURE — P9041: CPT

## 2018-03-26 PROCEDURE — P9045: CPT

## 2018-03-28 ENCOUNTER — OUTPATIENT (OUTPATIENT)
Dept: OUTPATIENT SERVICES | Facility: HOSPITAL | Age: 73
LOS: 1 days | End: 2018-03-28
Payer: MEDICARE

## 2018-03-28 DIAGNOSIS — Z98.890 OTHER SPECIFIED POSTPROCEDURAL STATES: Chronic | ICD-10-CM

## 2018-03-28 DIAGNOSIS — G70.00 MYASTHENIA GRAVIS WITHOUT (ACUTE) EXACERBATION: ICD-10-CM

## 2018-03-28 DIAGNOSIS — H26.8 OTHER SPECIFIED CATARACT: Chronic | ICD-10-CM

## 2018-03-28 LAB
HCT VFR BLD CALC: 39.5 % — SIGNIFICANT CHANGE UP (ref 39–50)
HGB BLD-MCNC: 13.3 G/DL — SIGNIFICANT CHANGE UP (ref 13–17)
MCHC RBC-ENTMCNC: 32 PG — SIGNIFICANT CHANGE UP (ref 27–34)
MCHC RBC-ENTMCNC: 33.6 GM/DL — SIGNIFICANT CHANGE UP (ref 32–36)
MCV RBC AUTO: 95.1 FL — SIGNIFICANT CHANGE UP (ref 80–100)
PLATELET # BLD AUTO: 288 K/UL — SIGNIFICANT CHANGE UP (ref 150–400)
RBC # BLD: 4.15 M/UL — LOW (ref 4.2–5.8)
RBC # FLD: 14.2 % — SIGNIFICANT CHANGE UP (ref 10.3–14.5)
WBC # BLD: 18.7 K/UL — HIGH (ref 3.8–10.5)
WBC # FLD AUTO: 18.7 K/UL — HIGH (ref 3.8–10.5)

## 2018-03-28 PROCEDURE — P9045: CPT

## 2018-03-28 PROCEDURE — 36514 APHERESIS PLASMA: CPT

## 2018-03-28 PROCEDURE — P9041: CPT

## 2018-03-28 PROCEDURE — 99232 SBSQ HOSP IP/OBS MODERATE 35: CPT | Mod: 25

## 2018-03-28 PROCEDURE — 85027 COMPLETE CBC AUTOMATED: CPT

## 2018-04-23 ENCOUNTER — OUTPATIENT (OUTPATIENT)
Dept: OUTPATIENT SERVICES | Facility: HOSPITAL | Age: 73
LOS: 1 days | End: 2018-04-23
Payer: MEDICARE

## 2018-04-23 DIAGNOSIS — H26.8 OTHER SPECIFIED CATARACT: Chronic | ICD-10-CM

## 2018-04-23 DIAGNOSIS — G70.00 MYASTHENIA GRAVIS WITHOUT (ACUTE) EXACERBATION: ICD-10-CM

## 2018-04-23 DIAGNOSIS — Z98.890 OTHER SPECIFIED POSTPROCEDURAL STATES: Chronic | ICD-10-CM

## 2018-04-23 LAB
HCT VFR BLD CALC: 38.7 % — LOW (ref 39–50)
HGB BLD-MCNC: 12.6 G/DL — LOW (ref 13–17)
MCHC RBC-ENTMCNC: 31.4 PG — SIGNIFICANT CHANGE UP (ref 27–34)
MCHC RBC-ENTMCNC: 32.6 GM/DL — SIGNIFICANT CHANGE UP (ref 32–36)
MCV RBC AUTO: 96.1 FL — SIGNIFICANT CHANGE UP (ref 80–100)
PLATELET # BLD AUTO: 288 K/UL — SIGNIFICANT CHANGE UP (ref 150–400)
RBC # BLD: 4.03 M/UL — LOW (ref 4.2–5.8)
RBC # FLD: 14.7 % — HIGH (ref 10.3–14.5)
WBC # BLD: 12.9 K/UL — HIGH (ref 3.8–10.5)
WBC # FLD AUTO: 12.9 K/UL — HIGH (ref 3.8–10.5)

## 2018-04-23 PROCEDURE — P9041: CPT

## 2018-04-23 PROCEDURE — 85027 COMPLETE CBC AUTOMATED: CPT

## 2018-04-23 PROCEDURE — 36514 APHERESIS PLASMA: CPT

## 2018-04-23 PROCEDURE — 99214 OFFICE O/P EST MOD 30 MIN: CPT | Mod: 25

## 2018-04-25 ENCOUNTER — OUTPATIENT (OUTPATIENT)
Dept: OUTPATIENT SERVICES | Facility: HOSPITAL | Age: 73
LOS: 1 days | End: 2018-04-25
Payer: MEDICARE

## 2018-04-25 DIAGNOSIS — H26.8 OTHER SPECIFIED CATARACT: Chronic | ICD-10-CM

## 2018-04-25 DIAGNOSIS — Z98.890 OTHER SPECIFIED POSTPROCEDURAL STATES: Chronic | ICD-10-CM

## 2018-04-25 DIAGNOSIS — G70.00 MYASTHENIA GRAVIS WITHOUT (ACUTE) EXACERBATION: ICD-10-CM

## 2018-04-25 LAB
HCT VFR BLD CALC: 40.2 % — SIGNIFICANT CHANGE UP (ref 39–50)
HGB BLD-MCNC: 13.4 G/DL — SIGNIFICANT CHANGE UP (ref 13–17)
MCHC RBC-ENTMCNC: 32.1 PG — SIGNIFICANT CHANGE UP (ref 27–34)
MCHC RBC-ENTMCNC: 33.5 GM/DL — SIGNIFICANT CHANGE UP (ref 32–36)
MCV RBC AUTO: 96 FL — SIGNIFICANT CHANGE UP (ref 80–100)
PLATELET # BLD AUTO: 311 K/UL — SIGNIFICANT CHANGE UP (ref 150–400)
RBC # BLD: 4.19 M/UL — LOW (ref 4.2–5.8)
RBC # FLD: 14.8 % — HIGH (ref 10.3–14.5)
WBC # BLD: 14.3 K/UL — HIGH (ref 3.8–10.5)
WBC # FLD AUTO: 14.3 K/UL — HIGH (ref 3.8–10.5)

## 2018-04-25 PROCEDURE — 36514 APHERESIS PLASMA: CPT

## 2018-04-25 PROCEDURE — P9041: CPT

## 2018-04-25 PROCEDURE — 99214 OFFICE O/P EST MOD 30 MIN: CPT | Mod: 25

## 2018-04-25 PROCEDURE — 85027 COMPLETE CBC AUTOMATED: CPT

## 2018-04-27 ENCOUNTER — OUTPATIENT (OUTPATIENT)
Dept: OUTPATIENT SERVICES | Facility: HOSPITAL | Age: 73
LOS: 1 days | End: 2018-04-27
Payer: MEDICARE

## 2018-04-27 ENCOUNTER — EMERGENCY (EMERGENCY)
Facility: HOSPITAL | Age: 73
LOS: 1 days | Discharge: HOME | End: 2018-04-27
Attending: EMERGENCY MEDICINE
Payer: MEDICARE

## 2018-04-27 VITALS
RESPIRATION RATE: 17 BRPM | DIASTOLIC BLOOD PRESSURE: 65 MMHG | HEART RATE: 85 BPM | TEMPERATURE: 97 F | SYSTOLIC BLOOD PRESSURE: 100 MMHG | OXYGEN SATURATION: 100 % | WEIGHT: 166.89 LBS | HEIGHT: 71 IN

## 2018-04-27 VITALS
SYSTOLIC BLOOD PRESSURE: 121 MMHG | RESPIRATION RATE: 18 BRPM | OXYGEN SATURATION: 100 % | DIASTOLIC BLOOD PRESSURE: 75 MMHG | HEART RATE: 86 BPM

## 2018-04-27 DIAGNOSIS — Z98.890 OTHER SPECIFIED POSTPROCEDURAL STATES: Chronic | ICD-10-CM

## 2018-04-27 DIAGNOSIS — H26.8 OTHER SPECIFIED CATARACT: Chronic | ICD-10-CM

## 2018-04-27 DIAGNOSIS — G70.00 MYASTHENIA GRAVIS WITHOUT (ACUTE) EXACERBATION: ICD-10-CM

## 2018-04-27 LAB
ALBUMIN SERPL ELPH-MCNC: 4.7 G/DL — SIGNIFICANT CHANGE UP (ref 3.3–5)
ALP SERPL-CCNC: 16 U/L — LOW (ref 40–120)
ALT FLD-CCNC: 6 U/L — LOW (ref 10–45)
ANION GAP SERPL CALC-SCNC: 13 MMOL/L — SIGNIFICANT CHANGE UP (ref 5–17)
AST SERPL-CCNC: 10 U/L — SIGNIFICANT CHANGE UP (ref 10–40)
BASOPHILS # BLD AUTO: 0 K/UL — SIGNIFICANT CHANGE UP (ref 0–0.2)
BASOPHILS NFR BLD AUTO: 0.1 % — SIGNIFICANT CHANGE UP (ref 0–2)
BILIRUB SERPL-MCNC: 0.5 MG/DL — SIGNIFICANT CHANGE UP (ref 0.2–1.2)
BUN SERPL-MCNC: 18 MG/DL — SIGNIFICANT CHANGE UP (ref 7–23)
CALCIUM SERPL-MCNC: 8.8 MG/DL — SIGNIFICANT CHANGE UP (ref 8.4–10.5)
CHLORIDE SERPL-SCNC: 109 MMOL/L — HIGH (ref 96–108)
CK MB CFR SERPL CALC: 1.3 NG/ML — SIGNIFICANT CHANGE UP (ref 0–6.7)
CK SERPL-CCNC: 23 U/L — LOW (ref 30–200)
CO2 SERPL-SCNC: 21 MMOL/L — LOW (ref 22–31)
CREAT SERPL-MCNC: 0.87 MG/DL — SIGNIFICANT CHANGE UP (ref 0.5–1.3)
EOSINOPHIL # BLD AUTO: 0 K/UL — SIGNIFICANT CHANGE UP (ref 0–0.5)
EOSINOPHIL NFR BLD AUTO: 0.2 % — SIGNIFICANT CHANGE UP (ref 0–6)
GLUCOSE SERPL-MCNC: 150 MG/DL — HIGH (ref 70–99)
HCT VFR BLD CALC: 38.5 % — LOW (ref 39–50)
HCT VFR BLD CALC: 41.6 % — SIGNIFICANT CHANGE UP (ref 39–50)
HGB BLD-MCNC: 12.7 G/DL — LOW (ref 13–17)
HGB BLD-MCNC: 13.6 G/DL — SIGNIFICANT CHANGE UP (ref 13–17)
LYMPHOCYTES # BLD AUTO: 0.6 K/UL — LOW (ref 1–3.3)
LYMPHOCYTES # BLD AUTO: 3.4 % — LOW (ref 13–44)
MCHC RBC-ENTMCNC: 31.6 PG — SIGNIFICANT CHANGE UP (ref 27–34)
MCHC RBC-ENTMCNC: 31.7 PG — SIGNIFICANT CHANGE UP (ref 27–34)
MCHC RBC-ENTMCNC: 32.6 GM/DL — SIGNIFICANT CHANGE UP (ref 32–36)
MCHC RBC-ENTMCNC: 33.1 GM/DL — SIGNIFICANT CHANGE UP (ref 32–36)
MCV RBC AUTO: 95.6 FL — SIGNIFICANT CHANGE UP (ref 80–100)
MCV RBC AUTO: 97 FL — SIGNIFICANT CHANGE UP (ref 80–100)
MONOCYTES # BLD AUTO: 1 K/UL — HIGH (ref 0–0.9)
MONOCYTES NFR BLD AUTO: 5.6 % — SIGNIFICANT CHANGE UP (ref 2–14)
NEUTROPHILS # BLD AUTO: 15.4 K/UL — HIGH (ref 1.8–7.4)
NEUTROPHILS NFR BLD AUTO: 90.6 % — HIGH (ref 43–77)
PLATELET # BLD AUTO: 263 K/UL — SIGNIFICANT CHANGE UP (ref 150–400)
PLATELET # BLD AUTO: 289 K/UL — SIGNIFICANT CHANGE UP (ref 150–400)
POTASSIUM SERPL-MCNC: 3.9 MMOL/L — SIGNIFICANT CHANGE UP (ref 3.5–5.3)
POTASSIUM SERPL-SCNC: 3.9 MMOL/L — SIGNIFICANT CHANGE UP (ref 3.5–5.3)
PROT SERPL-MCNC: 5.4 G/DL — LOW (ref 6–8.3)
RBC # BLD: 4.03 M/UL — LOW (ref 4.2–5.8)
RBC # BLD: 4.29 M/UL — SIGNIFICANT CHANGE UP (ref 4.2–5.8)
RBC # FLD: 14.8 % — HIGH (ref 10.3–14.5)
RBC # FLD: 15 % — HIGH (ref 10.3–14.5)
SODIUM SERPL-SCNC: 143 MMOL/L — SIGNIFICANT CHANGE UP (ref 135–145)
TROPONIN T SERPL-MCNC: <0.01 NG/ML — SIGNIFICANT CHANGE UP (ref 0–0.06)
TROPONIN T SERPL-MCNC: <0.01 NG/ML — SIGNIFICANT CHANGE UP (ref 0–0.06)
WBC # BLD: 14.3 K/UL — HIGH (ref 3.8–10.5)
WBC # BLD: 16.9 K/UL — HIGH (ref 3.8–10.5)
WBC # FLD AUTO: 14.3 K/UL — HIGH (ref 3.8–10.5)
WBC # FLD AUTO: 16.9 K/UL — HIGH (ref 3.8–10.5)

## 2018-04-27 PROCEDURE — 85027 COMPLETE CBC AUTOMATED: CPT

## 2018-04-27 PROCEDURE — 99283 EMERGENCY DEPT VISIT LOW MDM: CPT

## 2018-04-27 PROCEDURE — 36514 APHERESIS PLASMA: CPT

## 2018-04-27 PROCEDURE — 99214 OFFICE O/P EST MOD 30 MIN: CPT | Mod: 25

## 2018-04-27 PROCEDURE — 82553 CREATINE MB FRACTION: CPT

## 2018-04-27 PROCEDURE — 71046 X-RAY EXAM CHEST 2 VIEWS: CPT

## 2018-04-27 PROCEDURE — 99285 EMERGENCY DEPT VISIT HI MDM: CPT | Mod: 25,GC

## 2018-04-27 PROCEDURE — 82550 ASSAY OF CK (CPK): CPT

## 2018-04-27 PROCEDURE — 71046 X-RAY EXAM CHEST 2 VIEWS: CPT | Mod: 26

## 2018-04-27 PROCEDURE — 84484 ASSAY OF TROPONIN QUANT: CPT

## 2018-04-27 PROCEDURE — P9041: CPT

## 2018-04-27 PROCEDURE — 80053 COMPREHEN METABOLIC PANEL: CPT

## 2018-04-27 PROCEDURE — 93010 ELECTROCARDIOGRAM REPORT: CPT

## 2018-04-27 PROCEDURE — 93005 ELECTROCARDIOGRAM TRACING: CPT

## 2018-04-27 RX ORDER — SODIUM CHLORIDE 9 MG/ML
500 INJECTION INTRAMUSCULAR; INTRAVENOUS; SUBCUTANEOUS ONCE
Qty: 0 | Refills: 0 | Status: COMPLETED | OUTPATIENT
Start: 2018-04-27 | End: 2018-04-27

## 2018-04-27 RX ADMIN — SODIUM CHLORIDE 500 MILLILITER(S): 9 INJECTION INTRAMUSCULAR; INTRAVENOUS; SUBCUTANEOUS at 16:24

## 2018-04-27 NOTE — ED ADULT NURSE NOTE - CHPI ED SYMPTOMS NEG
no fever/no nausea/no back pain/no shortness of breath/no vomiting/no chills/no chest pain/no cough/no diaphoresis/no syncope

## 2018-04-27 NOTE — ED PROVIDER NOTE - OBJECTIVE STATEMENT
73 YOM pmh MG s/p plasmapheresis this morning. Pt       ekg 73 YOM pmh MG s/p plasmapheresis this morning. Pt started having lightheaded 2 hours after plasmapheresis. Pt was in the bathroom having a bowel movement when pt became diaphoretic and lightheaded and       ekg 73 YOM pmh MG s/p plasmapheresis this morning. Pt started having lightheaded 2 hours after plasmapheresis. Pt was in the bathroom having a bowel movement when pt became diaphoretic and lightheaded no LOC. Pt has similar episode 2 days ago after plasmapheresis. Pt improved after about 20 minutes. Pt denies any hx of LOC, pt denies any cardiac hx denies any chest pain, denies headache, denies fever, denies chills. Pt denies vomiting. Pt had an echocardiogram done one week ago and was told it was normal.       ekg

## 2018-04-27 NOTE — ED PROVIDER NOTE - PMH
Atrial fibrillation, unspecified type    Benign prostatic hyperplasia, presence of lower urinary tract symptoms unspecified, unspecified morphology    GERD (gastroesophageal reflux disease)    Glaucoma    Herpes virus disease  right eye  HLD (hyperlipidemia)    Iron deficiency anemia    Myasthenia gravis    Nasal polyp    Osteopenia    Sleep apnea  wear nasal strips  TIA (transient ischemic attack)  2006  Urinary retention

## 2018-04-27 NOTE — ED PROVIDER NOTE - NS ED ROS FT
CONSTITUTIONAL: No fevers, no chills  Eyes: no visual changes  Ears: no ear drainage, no ear pain  Nose: no nasal congestion  Mouth/Throat: no sore throat  Cardiovascular: No Chest pain  Respiratory: No SOB  Gastrointestinal: No n/v/d, no abd pain  Genitourinary: no dysuria, no hematuria  SKIN: no rashes.  NEURO: no headache, +lightheadedness  PSYCHIATRIC: no known mental health issues.

## 2018-04-27 NOTE — ED PROVIDER NOTE - PROGRESS NOTE DETAILS
ATTENDING MD Narda: signout from [facility]. 73M w/Hx of myasthenia gravis on plasmaphoresis who presented for lightheadedness and clammyness. In brief pt had plasmaphoresis today, recently started on lasix. went to lunch after plasmaphoresis felt some mild abdominal cramping went to the bathroom and had a large loose stool. stood up immediately afterwards felt lightheaded and sat down. wife got him and said was pale and clammy. was found to be in rapid afib at cardiologists office, improved but was sent to the ED. here not in rapid afib, appeared dry. gave fluids. orthostatics now negative, trop negative x2 for atypical event, EKG reportedly stable from prior (except for Afib which was recently diagnosed) per signout. Pt currently on loop recorder. Repeat exam benign, appears well, comfortable, well hydrated, no JVD. heart rate normal and irregular, no murmurs, lungs clear, no extremity edema, neuro exam grossly intact, normal gait. Pt plans to f/u with cardiologist tomorrow and interrogate loop recorder. advised of leukocytosis (which may be from stress or chronic steroids rather than infection) but cannot exclude infection and he will monitor. pt taking PO, appears well. stable for discharge with close follow up as likely vasovagal episode compounded with dehydration. pt understands signs and symptoms for which to return to the ED. wife also present and understands and now appropriate for DC.

## 2018-04-27 NOTE — ED PROVIDER NOTE - CARE PLAN
Principal Discharge DX:	Near syncope  Goal:	probable vasovagal in etiology  Secondary Diagnosis:	Dehydration  Secondary Diagnosis:	Abdominal cramping

## 2018-04-27 NOTE — ED PROVIDER NOTE - ATTENDING CONTRIBUTION TO CARE
attending Shahzad: 73yM h/o myasthenia gravis on monthly plasmapheresis presents after episode of lightheadedness after plasmapheresis this morning. Began while straining to have BM. Denies LOC. +diaphoresis. Reports similar episode after plasmapheresis previously. Currently asymptomatic. Denies chest pain, SOB, palpitations, headache, fever, chills, vomiting. Reports echocardiogram done one week ago, reportedly normal. Near syncope. Will place on tele, obtain ekg, labs including cardiac enzymes and reassess.

## 2018-04-27 NOTE — ED ADULT NURSE NOTE - OBJECTIVE STATEMENT
73 yr old male with h/o A-Fib present to the ER for dizziness. Pt reports that he came from  plasma phoresis  and after leaving he went to the bathroom and became dizziness , clammy , cold and had a headache. Pt reported that he had a headache with pain scale 2/10 which resolved. Pt denies passing out. Pt's wife report an employee helped him to get out the bathroom. Pt denies chest pain , shortness of breath, fever and chills.

## 2018-05-21 ENCOUNTER — OUTPATIENT (OUTPATIENT)
Dept: OUTPATIENT SERVICES | Facility: HOSPITAL | Age: 73
LOS: 1 days | End: 2018-05-21
Payer: MEDICARE

## 2018-05-21 DIAGNOSIS — Z98.890 OTHER SPECIFIED POSTPROCEDURAL STATES: Chronic | ICD-10-CM

## 2018-05-21 DIAGNOSIS — G70.00 MYASTHENIA GRAVIS WITHOUT (ACUTE) EXACERBATION: ICD-10-CM

## 2018-05-21 DIAGNOSIS — H26.8 OTHER SPECIFIED CATARACT: Chronic | ICD-10-CM

## 2018-05-21 LAB
HCT VFR BLD CALC: 35.5 % — LOW (ref 39–50)
HGB BLD-MCNC: 11.7 G/DL — LOW (ref 13–17)
MCHC RBC-ENTMCNC: 31.1 PG — SIGNIFICANT CHANGE UP (ref 27–34)
MCHC RBC-ENTMCNC: 32.8 GM/DL — SIGNIFICANT CHANGE UP (ref 32–36)
MCV RBC AUTO: 94.9 FL — SIGNIFICANT CHANGE UP (ref 80–100)
PLATELET # BLD AUTO: 342 K/UL — SIGNIFICANT CHANGE UP (ref 150–400)
RBC # BLD: 3.75 M/UL — LOW (ref 4.2–5.8)
RBC # FLD: 14.2 % — SIGNIFICANT CHANGE UP (ref 10.3–14.5)
WBC # BLD: 12.8 K/UL — HIGH (ref 3.8–10.5)
WBC # FLD AUTO: 12.8 K/UL — HIGH (ref 3.8–10.5)

## 2018-05-21 PROCEDURE — 99214 OFFICE O/P EST MOD 30 MIN: CPT | Mod: 25

## 2018-05-21 PROCEDURE — 36514 APHERESIS PLASMA: CPT

## 2018-05-21 PROCEDURE — P9041: CPT

## 2018-05-21 PROCEDURE — 85027 COMPLETE CBC AUTOMATED: CPT

## 2018-05-23 ENCOUNTER — OUTPATIENT (OUTPATIENT)
Dept: OUTPATIENT SERVICES | Facility: HOSPITAL | Age: 73
LOS: 1 days | End: 2018-05-23
Payer: MEDICARE

## 2018-05-23 DIAGNOSIS — H26.8 OTHER SPECIFIED CATARACT: Chronic | ICD-10-CM

## 2018-05-23 DIAGNOSIS — Z98.890 OTHER SPECIFIED POSTPROCEDURAL STATES: Chronic | ICD-10-CM

## 2018-05-23 DIAGNOSIS — G70.00 MYASTHENIA GRAVIS WITHOUT (ACUTE) EXACERBATION: ICD-10-CM

## 2018-05-23 LAB
HCT VFR BLD CALC: 36 % — LOW (ref 39–50)
HGB BLD-MCNC: 12 G/DL — LOW (ref 13–17)
MCHC RBC-ENTMCNC: 31.9 PG — SIGNIFICANT CHANGE UP (ref 27–34)
MCHC RBC-ENTMCNC: 33.3 GM/DL — SIGNIFICANT CHANGE UP (ref 32–36)
MCV RBC AUTO: 95.9 FL — SIGNIFICANT CHANGE UP (ref 80–100)
PLATELET # BLD AUTO: 326 K/UL — SIGNIFICANT CHANGE UP (ref 150–400)
RBC # BLD: 3.75 M/UL — LOW (ref 4.2–5.8)
RBC # FLD: 14.3 % — SIGNIFICANT CHANGE UP (ref 10.3–14.5)
WBC # BLD: 15.4 K/UL — HIGH (ref 3.8–10.5)
WBC # FLD AUTO: 15.4 K/UL — HIGH (ref 3.8–10.5)

## 2018-05-23 PROCEDURE — 36514 APHERESIS PLASMA: CPT

## 2018-05-23 PROCEDURE — 36593 DECLOT VASCULAR DEVICE: CPT

## 2018-05-23 PROCEDURE — P9041: CPT

## 2018-05-23 PROCEDURE — 36430 TRANSFUSION BLD/BLD COMPNT: CPT

## 2018-05-23 PROCEDURE — 99214 OFFICE O/P EST MOD 30 MIN: CPT | Mod: 25

## 2018-05-23 PROCEDURE — 85027 COMPLETE CBC AUTOMATED: CPT

## 2018-05-25 ENCOUNTER — OUTPATIENT (OUTPATIENT)
Dept: OUTPATIENT SERVICES | Facility: HOSPITAL | Age: 73
LOS: 1 days | End: 2018-05-25
Payer: MEDICARE

## 2018-05-25 DIAGNOSIS — G70.00 MYASTHENIA GRAVIS WITHOUT (ACUTE) EXACERBATION: ICD-10-CM

## 2018-05-25 DIAGNOSIS — Z98.890 OTHER SPECIFIED POSTPROCEDURAL STATES: Chronic | ICD-10-CM

## 2018-05-25 DIAGNOSIS — H26.8 OTHER SPECIFIED CATARACT: Chronic | ICD-10-CM

## 2018-05-25 LAB
HCT VFR BLD CALC: 36.6 % — LOW (ref 39–50)
HGB BLD-MCNC: 11.8 G/DL — LOW (ref 13–17)
MCHC RBC-ENTMCNC: 30.6 PG — SIGNIFICANT CHANGE UP (ref 27–34)
MCHC RBC-ENTMCNC: 32.2 GM/DL — SIGNIFICANT CHANGE UP (ref 32–36)
MCV RBC AUTO: 95 FL — SIGNIFICANT CHANGE UP (ref 80–100)
PLATELET # BLD AUTO: 318 K/UL — SIGNIFICANT CHANGE UP (ref 150–400)
RBC # BLD: 3.86 M/UL — LOW (ref 4.2–5.8)
RBC # FLD: 14.4 % — SIGNIFICANT CHANGE UP (ref 10.3–14.5)
WBC # BLD: 13.9 K/UL — HIGH (ref 3.8–10.5)
WBC # FLD AUTO: 13.9 K/UL — HIGH (ref 3.8–10.5)

## 2018-05-25 PROCEDURE — 36514 APHERESIS PLASMA: CPT

## 2018-05-25 PROCEDURE — 99214 OFFICE O/P EST MOD 30 MIN: CPT | Mod: 25

## 2018-05-25 PROCEDURE — P9041: CPT

## 2018-05-25 PROCEDURE — 85027 COMPLETE CBC AUTOMATED: CPT

## 2018-06-22 ENCOUNTER — OUTPATIENT (OUTPATIENT)
Dept: OUTPATIENT SERVICES | Facility: HOSPITAL | Age: 73
LOS: 1 days | End: 2018-06-22
Payer: MEDICARE

## 2018-06-22 DIAGNOSIS — H26.8 OTHER SPECIFIED CATARACT: Chronic | ICD-10-CM

## 2018-06-22 DIAGNOSIS — Z98.890 OTHER SPECIFIED POSTPROCEDURAL STATES: Chronic | ICD-10-CM

## 2018-06-22 DIAGNOSIS — G70.00 MYASTHENIA GRAVIS WITHOUT (ACUTE) EXACERBATION: ICD-10-CM

## 2018-06-22 LAB
HCT VFR BLD CALC: 37.8 % — LOW (ref 39–50)
HGB BLD-MCNC: 12.8 G/DL — LOW (ref 13–17)
MCHC RBC-ENTMCNC: 31.7 PG — SIGNIFICANT CHANGE UP (ref 27–34)
MCHC RBC-ENTMCNC: 33.8 GM/DL — SIGNIFICANT CHANGE UP (ref 32–36)
MCV RBC AUTO: 94 FL — SIGNIFICANT CHANGE UP (ref 80–100)
PLATELET # BLD AUTO: 420 K/UL — HIGH (ref 150–400)
RBC # BLD: 4.02 M/UL — LOW (ref 4.2–5.8)
RBC # FLD: 14.1 % — SIGNIFICANT CHANGE UP (ref 10.3–14.5)
WBC # BLD: 12.9 K/UL — HIGH (ref 3.8–10.5)
WBC # FLD AUTO: 12.9 K/UL — HIGH (ref 3.8–10.5)

## 2018-06-22 PROCEDURE — P9041: CPT

## 2018-06-22 PROCEDURE — 36514 APHERESIS PLASMA: CPT

## 2018-06-22 PROCEDURE — 99214 OFFICE O/P EST MOD 30 MIN: CPT | Mod: 25

## 2018-06-22 PROCEDURE — 85027 COMPLETE CBC AUTOMATED: CPT

## 2018-06-25 ENCOUNTER — OUTPATIENT (OUTPATIENT)
Dept: OUTPATIENT SERVICES | Facility: HOSPITAL | Age: 73
LOS: 1 days | End: 2018-06-25
Payer: MEDICARE

## 2018-06-25 DIAGNOSIS — G70.00 MYASTHENIA GRAVIS WITHOUT (ACUTE) EXACERBATION: ICD-10-CM

## 2018-06-25 DIAGNOSIS — Z98.890 OTHER SPECIFIED POSTPROCEDURAL STATES: Chronic | ICD-10-CM

## 2018-06-25 DIAGNOSIS — H26.8 OTHER SPECIFIED CATARACT: Chronic | ICD-10-CM

## 2018-06-25 LAB
HCT VFR BLD CALC: 37.2 % — LOW (ref 39–50)
HGB BLD-MCNC: 12.7 G/DL — LOW (ref 13–17)
MCHC RBC-ENTMCNC: 32.1 PG — SIGNIFICANT CHANGE UP (ref 27–34)
MCHC RBC-ENTMCNC: 34 GM/DL — SIGNIFICANT CHANGE UP (ref 32–36)
MCV RBC AUTO: 94.3 FL — SIGNIFICANT CHANGE UP (ref 80–100)
PLATELET # BLD AUTO: 402 K/UL — HIGH (ref 150–400)
RBC # BLD: 3.94 M/UL — LOW (ref 4.2–5.8)
RBC # FLD: 14.1 % — SIGNIFICANT CHANGE UP (ref 10.3–14.5)
WBC # BLD: 13.8 K/UL — HIGH (ref 3.8–10.5)
WBC # FLD AUTO: 13.8 K/UL — HIGH (ref 3.8–10.5)

## 2018-06-25 PROCEDURE — 36514 APHERESIS PLASMA: CPT

## 2018-06-25 PROCEDURE — 85027 COMPLETE CBC AUTOMATED: CPT

## 2018-06-25 PROCEDURE — 99213 OFFICE O/P EST LOW 20 MIN: CPT | Mod: 25

## 2018-06-25 PROCEDURE — P9041: CPT

## 2018-06-27 ENCOUNTER — OUTPATIENT (OUTPATIENT)
Dept: OUTPATIENT SERVICES | Facility: HOSPITAL | Age: 73
LOS: 1 days | End: 2018-06-27
Payer: MEDICARE

## 2018-06-27 DIAGNOSIS — Z98.890 OTHER SPECIFIED POSTPROCEDURAL STATES: Chronic | ICD-10-CM

## 2018-06-27 DIAGNOSIS — G70.00 MYASTHENIA GRAVIS WITHOUT (ACUTE) EXACERBATION: ICD-10-CM

## 2018-06-27 DIAGNOSIS — H26.8 OTHER SPECIFIED CATARACT: Chronic | ICD-10-CM

## 2018-06-27 LAB
HCT VFR BLD CALC: 35.5 % — LOW (ref 39–50)
HGB BLD-MCNC: 11.2 G/DL — LOW (ref 13–17)
MCHC RBC-ENTMCNC: 29.8 PG — SIGNIFICANT CHANGE UP (ref 27–34)
MCHC RBC-ENTMCNC: 31.7 GM/DL — LOW (ref 32–36)
MCV RBC AUTO: 94.3 FL — SIGNIFICANT CHANGE UP (ref 80–100)
PLATELET # BLD AUTO: 352 K/UL — SIGNIFICANT CHANGE UP (ref 150–400)
RBC # BLD: 3.77 M/UL — LOW (ref 4.2–5.8)
RBC # FLD: 14.1 % — SIGNIFICANT CHANGE UP (ref 10.3–14.5)
WBC # BLD: 12.1 K/UL — HIGH (ref 3.8–10.5)
WBC # FLD AUTO: 12.1 K/UL — HIGH (ref 3.8–10.5)

## 2018-06-27 PROCEDURE — 99214 OFFICE O/P EST MOD 30 MIN: CPT | Mod: 25

## 2018-06-27 PROCEDURE — 36514 APHERESIS PLASMA: CPT

## 2018-06-27 PROCEDURE — 85027 COMPLETE CBC AUTOMATED: CPT

## 2018-06-27 PROCEDURE — 36593 DECLOT VASCULAR DEVICE: CPT

## 2018-06-27 PROCEDURE — P9041: CPT

## 2018-07-19 ENCOUNTER — OUTPATIENT (OUTPATIENT)
Dept: OUTPATIENT SERVICES | Facility: HOSPITAL | Age: 73
LOS: 1 days | End: 2018-07-19
Payer: MEDICARE

## 2018-07-19 DIAGNOSIS — H26.8 OTHER SPECIFIED CATARACT: Chronic | ICD-10-CM

## 2018-07-19 DIAGNOSIS — G70.00 MYASTHENIA GRAVIS WITHOUT (ACUTE) EXACERBATION: ICD-10-CM

## 2018-07-19 DIAGNOSIS — Z98.890 OTHER SPECIFIED POSTPROCEDURAL STATES: Chronic | ICD-10-CM

## 2018-07-19 PROCEDURE — 36598 INJ W/FLUOR EVAL CV DEVICE: CPT

## 2018-07-25 ENCOUNTER — OUTPATIENT (OUTPATIENT)
Dept: OUTPATIENT SERVICES | Facility: HOSPITAL | Age: 73
LOS: 1 days | End: 2018-07-25
Payer: MEDICARE

## 2018-07-25 DIAGNOSIS — Z98.890 OTHER SPECIFIED POSTPROCEDURAL STATES: Chronic | ICD-10-CM

## 2018-07-25 DIAGNOSIS — H26.8 OTHER SPECIFIED CATARACT: Chronic | ICD-10-CM

## 2018-07-25 DIAGNOSIS — G70.00 MYASTHENIA GRAVIS WITHOUT (ACUTE) EXACERBATION: ICD-10-CM

## 2018-07-25 PROBLEM — B00.9 HERPESVIRAL INFECTION, UNSPECIFIED: Chronic | Status: ACTIVE | Noted: 2017-12-03

## 2018-07-25 PROBLEM — M85.80 OTHER SPECIFIED DISORDERS OF BONE DENSITY AND STRUCTURE, UNSPECIFIED SITE: Chronic | Status: ACTIVE | Noted: 2017-12-03

## 2018-07-25 PROBLEM — H40.9 UNSPECIFIED GLAUCOMA: Chronic | Status: ACTIVE | Noted: 2017-12-03

## 2018-07-25 LAB
HCT VFR BLD CALC: 41.1 % — SIGNIFICANT CHANGE UP (ref 39–50)
HGB BLD-MCNC: 13.3 G/DL — SIGNIFICANT CHANGE UP (ref 13–17)
MCHC RBC-ENTMCNC: 30.1 PG — SIGNIFICANT CHANGE UP (ref 27–34)
MCHC RBC-ENTMCNC: 32.4 GM/DL — SIGNIFICANT CHANGE UP (ref 32–36)
MCV RBC AUTO: 92.7 FL — SIGNIFICANT CHANGE UP (ref 80–100)
PLATELET # BLD AUTO: 297 K/UL — SIGNIFICANT CHANGE UP (ref 150–400)
RBC # BLD: 4.44 M/UL — SIGNIFICANT CHANGE UP (ref 4.2–5.8)
RBC # FLD: 14.3 % — SIGNIFICANT CHANGE UP (ref 10.3–14.5)
WBC # BLD: 13.1 K/UL — HIGH (ref 3.8–10.5)
WBC # FLD AUTO: 13.1 K/UL — HIGH (ref 3.8–10.5)

## 2018-07-25 PROCEDURE — 85027 COMPLETE CBC AUTOMATED: CPT

## 2018-07-25 PROCEDURE — 99214 OFFICE O/P EST MOD 30 MIN: CPT | Mod: 25

## 2018-07-25 PROCEDURE — 36514 APHERESIS PLASMA: CPT

## 2018-07-25 PROCEDURE — P9041: CPT

## 2018-07-26 DIAGNOSIS — G70.00 MYASTHENIA GRAVIS WITHOUT (ACUTE) EXACERBATION: ICD-10-CM

## 2018-07-26 DIAGNOSIS — T82.598A OTHER MECHANICAL COMPLICATION OF OTHER CARDIAC AND VASCULAR DEVICES AND IMPLANTS, INITIAL ENCOUNTER: ICD-10-CM

## 2018-07-27 ENCOUNTER — OUTPATIENT (OUTPATIENT)
Dept: OUTPATIENT SERVICES | Facility: HOSPITAL | Age: 73
LOS: 1 days | End: 2018-07-27
Payer: MEDICARE

## 2018-07-27 DIAGNOSIS — Z98.890 OTHER SPECIFIED POSTPROCEDURAL STATES: Chronic | ICD-10-CM

## 2018-07-27 DIAGNOSIS — H26.8 OTHER SPECIFIED CATARACT: Chronic | ICD-10-CM

## 2018-07-27 DIAGNOSIS — G70.00 MYASTHENIA GRAVIS WITHOUT (ACUTE) EXACERBATION: ICD-10-CM

## 2018-07-27 LAB
HCT VFR BLD CALC: 40.1 % — SIGNIFICANT CHANGE UP (ref 39–50)
HGB BLD-MCNC: 13.3 G/DL — SIGNIFICANT CHANGE UP (ref 13–17)
MCHC RBC-ENTMCNC: 30.5 PG — SIGNIFICANT CHANGE UP (ref 27–34)
MCHC RBC-ENTMCNC: 33.2 GM/DL — SIGNIFICANT CHANGE UP (ref 32–36)
MCV RBC AUTO: 92.1 FL — SIGNIFICANT CHANGE UP (ref 80–100)
PLATELET # BLD AUTO: 264 K/UL — SIGNIFICANT CHANGE UP (ref 150–400)
RBC # BLD: 4.35 M/UL — SIGNIFICANT CHANGE UP (ref 4.2–5.8)
RBC # FLD: 14 % — SIGNIFICANT CHANGE UP (ref 10.3–14.5)
WBC # BLD: 17 K/UL — HIGH (ref 3.8–10.5)
WBC # FLD AUTO: 17 K/UL — HIGH (ref 3.8–10.5)

## 2018-07-27 PROCEDURE — 85027 COMPLETE CBC AUTOMATED: CPT

## 2018-07-27 PROCEDURE — P9045: CPT

## 2018-07-27 PROCEDURE — P9041: CPT

## 2018-07-27 PROCEDURE — 36514 APHERESIS PLASMA: CPT

## 2018-07-27 PROCEDURE — 99214 OFFICE O/P EST MOD 30 MIN: CPT | Mod: 25

## 2018-07-30 ENCOUNTER — OUTPATIENT (OUTPATIENT)
Dept: OUTPATIENT SERVICES | Facility: HOSPITAL | Age: 73
LOS: 1 days | End: 2018-07-30
Payer: MEDICARE

## 2018-07-30 DIAGNOSIS — G70.00 MYASTHENIA GRAVIS WITHOUT (ACUTE) EXACERBATION: ICD-10-CM

## 2018-07-30 DIAGNOSIS — H26.8 OTHER SPECIFIED CATARACT: Chronic | ICD-10-CM

## 2018-07-30 DIAGNOSIS — Z98.890 OTHER SPECIFIED POSTPROCEDURAL STATES: Chronic | ICD-10-CM

## 2018-07-30 LAB
HCT VFR BLD CALC: 39 % — SIGNIFICANT CHANGE UP (ref 39–50)
HGB BLD-MCNC: 13 G/DL — SIGNIFICANT CHANGE UP (ref 13–17)
MCHC RBC-ENTMCNC: 30.6 PG — SIGNIFICANT CHANGE UP (ref 27–34)
MCHC RBC-ENTMCNC: 33.3 GM/DL — SIGNIFICANT CHANGE UP (ref 32–36)
MCV RBC AUTO: 92.1 FL — SIGNIFICANT CHANGE UP (ref 80–100)
PLATELET # BLD AUTO: 273 K/UL — SIGNIFICANT CHANGE UP (ref 150–400)
RBC # BLD: 4.24 M/UL — SIGNIFICANT CHANGE UP (ref 4.2–5.8)
RBC # FLD: 14 % — SIGNIFICANT CHANGE UP (ref 10.3–14.5)
WBC # BLD: 14 K/UL — HIGH (ref 3.8–10.5)
WBC # FLD AUTO: 14 K/UL — HIGH (ref 3.8–10.5)

## 2018-07-30 PROCEDURE — 99214 OFFICE O/P EST MOD 30 MIN: CPT | Mod: 25

## 2018-07-30 PROCEDURE — 36514 APHERESIS PLASMA: CPT

## 2018-07-30 PROCEDURE — P9041: CPT

## 2018-07-30 PROCEDURE — 85027 COMPLETE CBC AUTOMATED: CPT

## 2018-08-27 ENCOUNTER — OUTPATIENT (OUTPATIENT)
Dept: OUTPATIENT SERVICES | Facility: HOSPITAL | Age: 73
LOS: 1 days | End: 2018-08-27
Payer: MEDICARE

## 2018-08-27 DIAGNOSIS — G70.00 MYASTHENIA GRAVIS WITHOUT (ACUTE) EXACERBATION: ICD-10-CM

## 2018-08-27 DIAGNOSIS — H26.8 OTHER SPECIFIED CATARACT: Chronic | ICD-10-CM

## 2018-08-27 DIAGNOSIS — Z98.890 OTHER SPECIFIED POSTPROCEDURAL STATES: Chronic | ICD-10-CM

## 2018-08-27 LAB
HCT VFR BLD CALC: 41.2 % — SIGNIFICANT CHANGE UP (ref 39–50)
HGB BLD-MCNC: 13.4 G/DL — SIGNIFICANT CHANGE UP (ref 13–17)
MCHC RBC-ENTMCNC: 30 PG — SIGNIFICANT CHANGE UP (ref 27–34)
MCHC RBC-ENTMCNC: 32.6 GM/DL — SIGNIFICANT CHANGE UP (ref 32–36)
MCV RBC AUTO: 92 FL — SIGNIFICANT CHANGE UP (ref 80–100)
PLATELET # BLD AUTO: 264 K/UL — SIGNIFICANT CHANGE UP (ref 150–400)
RBC # BLD: 4.48 M/UL — SIGNIFICANT CHANGE UP (ref 4.2–5.8)
RBC # FLD: 14.3 % — SIGNIFICANT CHANGE UP (ref 10.3–14.5)
WBC # BLD: 10.3 K/UL — SIGNIFICANT CHANGE UP (ref 3.8–10.5)
WBC # FLD AUTO: 10.3 K/UL — SIGNIFICANT CHANGE UP (ref 3.8–10.5)

## 2018-08-27 PROCEDURE — 36514 APHERESIS PLASMA: CPT

## 2018-08-27 PROCEDURE — 99214 OFFICE O/P EST MOD 30 MIN: CPT | Mod: 25

## 2018-08-27 PROCEDURE — 85027 COMPLETE CBC AUTOMATED: CPT

## 2018-08-27 PROCEDURE — P9041: CPT

## 2018-08-29 ENCOUNTER — OUTPATIENT (OUTPATIENT)
Dept: OUTPATIENT SERVICES | Facility: HOSPITAL | Age: 73
LOS: 1 days | End: 2018-08-29
Payer: MEDICARE

## 2018-08-29 DIAGNOSIS — G70.00 MYASTHENIA GRAVIS WITHOUT (ACUTE) EXACERBATION: ICD-10-CM

## 2018-08-29 DIAGNOSIS — H26.8 OTHER SPECIFIED CATARACT: Chronic | ICD-10-CM

## 2018-08-29 DIAGNOSIS — Z98.890 OTHER SPECIFIED POSTPROCEDURAL STATES: Chronic | ICD-10-CM

## 2018-08-29 LAB
HCT VFR BLD CALC: 40.8 % — SIGNIFICANT CHANGE UP (ref 39–50)
HGB BLD-MCNC: 12.8 G/DL — LOW (ref 13–17)
MCHC RBC-ENTMCNC: 28.9 PG — SIGNIFICANT CHANGE UP (ref 27–34)
MCHC RBC-ENTMCNC: 31.4 GM/DL — LOW (ref 32–36)
MCV RBC AUTO: 92 FL — SIGNIFICANT CHANGE UP (ref 80–100)
PLATELET # BLD AUTO: 297 K/UL — SIGNIFICANT CHANGE UP (ref 150–400)
RBC # BLD: 4.43 M/UL — SIGNIFICANT CHANGE UP (ref 4.2–5.8)
RBC # FLD: 14.5 % — SIGNIFICANT CHANGE UP (ref 10.3–14.5)
WBC # BLD: 10 K/UL — SIGNIFICANT CHANGE UP (ref 3.8–10.5)
WBC # FLD AUTO: 10 K/UL — SIGNIFICANT CHANGE UP (ref 3.8–10.5)

## 2018-08-29 PROCEDURE — 99214 OFFICE O/P EST MOD 30 MIN: CPT | Mod: 25

## 2018-08-29 PROCEDURE — P9041: CPT

## 2018-08-29 PROCEDURE — 85027 COMPLETE CBC AUTOMATED: CPT

## 2018-08-29 PROCEDURE — 36514 APHERESIS PLASMA: CPT

## 2018-08-29 PROCEDURE — 36593 DECLOT VASCULAR DEVICE: CPT

## 2018-08-31 ENCOUNTER — OUTPATIENT (OUTPATIENT)
Dept: OUTPATIENT SERVICES | Facility: HOSPITAL | Age: 73
LOS: 1 days | End: 2018-08-31
Payer: MEDICARE

## 2018-08-31 DIAGNOSIS — H26.8 OTHER SPECIFIED CATARACT: Chronic | ICD-10-CM

## 2018-08-31 DIAGNOSIS — Z98.890 OTHER SPECIFIED POSTPROCEDURAL STATES: Chronic | ICD-10-CM

## 2018-08-31 DIAGNOSIS — G70.00 MYASTHENIA GRAVIS WITHOUT (ACUTE) EXACERBATION: ICD-10-CM

## 2018-08-31 LAB
HCT VFR BLD CALC: 40.6 % — SIGNIFICANT CHANGE UP (ref 39–50)
HGB BLD-MCNC: 13.4 G/DL — SIGNIFICANT CHANGE UP (ref 13–17)
MCHC RBC-ENTMCNC: 30.1 PG — SIGNIFICANT CHANGE UP (ref 27–34)
MCHC RBC-ENTMCNC: 32.9 GM/DL — SIGNIFICANT CHANGE UP (ref 32–36)
MCV RBC AUTO: 91.6 FL — SIGNIFICANT CHANGE UP (ref 80–100)
PLATELET # BLD AUTO: 274 K/UL — SIGNIFICANT CHANGE UP (ref 150–400)
RBC # BLD: 4.43 M/UL — SIGNIFICANT CHANGE UP (ref 4.2–5.8)
RBC # FLD: 14.3 % — SIGNIFICANT CHANGE UP (ref 10.3–14.5)
WBC # BLD: 18.2 K/UL — HIGH (ref 3.8–10.5)
WBC # FLD AUTO: 18.2 K/UL — HIGH (ref 3.8–10.5)

## 2018-08-31 PROCEDURE — P9041: CPT

## 2018-08-31 PROCEDURE — 36514 APHERESIS PLASMA: CPT

## 2018-08-31 PROCEDURE — 85027 COMPLETE CBC AUTOMATED: CPT

## 2018-08-31 PROCEDURE — 99214 OFFICE O/P EST MOD 30 MIN: CPT | Mod: 25

## 2018-08-31 PROCEDURE — P9045: CPT

## 2018-09-06 NOTE — PROGRESS NOTE ADULT - SUBJECTIVE AND OBJECTIVE BOX
Patient is a 72y old  Male who presents with a chief complaint of Temperature 102.8, chills, backpain (03 Dec 2017 20:43)      INTERVAL HPI/OVERNIGHT EVENTS:    72 y/o M PMHx of HLD, A-fib, MG (2016) on prednisone, TIA (), urinary retention, sleep apnea no CPAP, glaucoma, herpes virus of right eye, iron deficiency anemia presenting with fever and chills since 6am. Patient reports dysuria, increase urge to urinate, hematuria, nonradiating dull lower back pain(different than recent back pain) x2 days. Patient was seen by Dr. Geronimo on 17 in the office for left sided back pain. Ultrasound and UA were both unremarkable. Continued the flomax and was sent to [place] ED on 17 for CT lumbar spine that showed degenerative changes. No trauma. Discharged home on tylenol and lidocaine patch. Denies SOB, CP, cough, sore throat, N/V/D, or BRBPR. No sick contacts. No travel.    In the ED, febrile 102.8, , /73, sat 94% RA, RR 16, WBC 18.6, H/H 13.8/43.1, plat 252, Bands 9.0, Coags PT 12.3, INR 1.13, PTT 28, BUN 16, Cr 0.75, Glu 110, UA positive nitrites, trace leuk, mod blood, wbc 11-25, rbc 6-10, many bacteria, CXR There are prominent bronchovascular markings at the left lower lung zone.No lobar lung consolidation significant pleural effusion or pneumothorax is noted. EKG sinus tachy @119BPM. Received IV azactam, vanco, tylenol    Overnight events: Patient seen at bedside. Reports improved dysuria, frequency, lower back pain.  MEDICATIONS  (STANDING):  ascorbic acid 1000 milliGRAM(s) Oral daily  calcium carbonate  625 mG + Vitamin D (OsCal 250 + D) 1 Tablet(s) Oral two times a day  cefTRIAXone   IVPB 1 Gram(s) IV Intermittent every 24 hours  cefTRIAXone   IVPB      cholecalciferol 2000 Unit(s) Oral daily  dabigatran 150 milliGRAM(s) Oral every 12 hours  diltiazem    milliGRAM(s) Oral <User Schedule>  docusate sodium 100 milliGRAM(s) Oral at bedtime  ferrous    sulfate 325 milliGRAM(s) Oral daily  ganciclovir 0.15% Ophthalmic Gel 1 Drop(s) Right EYE daily  latanoprost 0.005% Ophthalmic Solution 1 Drop(s) Both EYES at bedtime  mirtazapine 7.5 milliGRAM(s) Oral at bedtime  multivitamin/minerals 1 Tablet(s) Oral daily  mycophenolate mofetil 1000 milliGRAM(s) Oral two times a day  omega-3-Acid Ethyl Esters 2 Gram(s) Oral two times a day  predniSONE   Tablet 40 milliGRAM(s) Oral daily  pyridostigmine 60 milliGRAM(s) Oral <User Schedule>  pyridostigmine  milliGRAM(s) Oral at bedtime  senna 1 Tablet(s) Oral at bedtime  tamsulosin 0.4 milliGRAM(s) Oral at bedtime    MEDICATIONS  (PRN):  acetaminophen   Tablet 650 milliGRAM(s) Oral every 6 hours PRN For Temp greater than 38 C (100.4 F)      Allergies    immune globulin intravenous (Unknown)  penicillins (Rash)    Intolerances        REVIEW OF SYSTEMS:  CONSTITUTIONAL: No fever, or fatigue  RESPIRATORY: No cough, wheezing, chills or hemoptysis; No shortness of breath  CARDIOVASCULAR: No chest pain, palpitations, dizziness, or leg swelling  GASTROINTESTINAL: Improved suprapubic pain. No nausea, vomiting, or hematemesis; No diarrhea or constipation. No melena or hematochezia.  GENITOURINARY: Improved dysuria, frequency. No incontinence  NEUROLOGICAL: No headaches, memory loss, loss of strength, numbness, or tremors  SKIN: No itching, burning, rashes, or lesions   MUSCULOSKELETAL: No joint pain or swelling; No muscle, back, or extremity pain  PSYCHIATRIC: No depression, anxiety, mood swings, or difficulty sleeping    Vital Signs Last 24 Hrs  T(C): 37.1 (04 Dec 2017 14:04), Max: 37.1 (04 Dec 2017 14:04)  T(F): 98.7 (04 Dec 2017 14:04), Max: 98.7 (04 Dec 2017 14:04)  HR: 80 (04 Dec 2017 14:04) (66 - 84)  BP: 129/64 (04 Dec 2017 14:04) (122/69 - 132/60)  BP(mean): --  RR: 17 (04 Dec 2017 14:04) (17 - 18)  SpO2: 98% (04 Dec 2017 14:04) (96% - 100%)    PHYSICAL EXAM:  GENERAL: NAD, well-groomed, well-developed  HEAD:  Atraumatic, Normocephalic  EYES: EOMI, PERRLA, conjunctiva and sclera clear  ENMT: No tonsillar erythema, exudates, or enlargement; Moist mucous membranes,   NECK: Supple, No JVD, Normal thyroid  NERVOUS SYSTEM:  Alert & Oriented X3, Good concentration; Motor Strength 5/5 B/L upper and lower extremities  CHEST/LUNG: Clear to auscultation bilaterally; No rales, rhonchi, wheezing, or rubs  HEART: Regular rate and rhythm; No murmurs, rubs, or gallops  ABDOMEN: Soft, Nontender, Nondistended; Bowel sounds present  EXTREMITIES:  2+ Peripheral Pulses, No clubbing, cyanosis, or edema  LYMPH: No lymphadenopathy noted  SKIN: No rashes or lesions    LABS:                        12.6   21.6  )-----------( 218      ( 04 Dec 2017 06:55 )             39.4     04 Dec 2017 06:55    145    |  110    |  16     ----------------------------<  113    4.0     |  31     |  0.64     Ca    8.9        04 Dec 2017 06:55      PT/INR - ( 03 Dec 2017 12:15 )   PT: 12.3 sec;   INR: 1.13 ratio         PTT - ( 03 Dec 2017 12:15 )  PTT:28.0 sec  Urinalysis Basic - ( 03 Dec 2017 12:46 )    Color: Yellow / Appearance: Clear / S.015 / pH: x  Gluc: x / Ketone: Negative  / Bili: Negative / Urobili: Negative   Blood: x / Protein: Negative / Nitrite: Positive   Leuk Esterase: Trace / RBC: 6-10 /HPF / WBC 11-25   Sq Epi: x / Non Sq Epi: Few / Bacteria: Many Patient is a 72y old  Male who presents with a chief complaint of Temperature 102.8, chills, backpain (03 Dec 2017 20:43)      INTERVAL HPI/OVERNIGHT EVENTS:    70 y/o M PMHx of HLD, A-fib, MG (2016) on prednisone, TIA (), urinary retention, sleep apnea no CPAP, glaucoma, herpes virus of right eye, iron deficiency anemia presenting with fever and chills since 6am. Patient reports dysuria, increase urge to urinate, hematuria, nonradiating dull lower back pain(different than recent back pain) x2 days. Patient was seen by Dr. Geronimo on 17 in the office for left sided back pain. Ultrasound and UA were both unremarkable. Continued the flomax and was sent to [place] ED on 17 for CT lumbar spine that showed degenerative changes. No trauma. Discharged home on tylenol and lidocaine patch. Denies SOB, CP, cough, sore throat, N/V/D, or BRBPR. No sick contacts. No travel.    In the ED, febrile 102.8, , /73, sat 94% RA, RR 16, WBC 18.6, H/H 13.8/43.1, plat 252, Bands 9.0, Coags PT 12.3, INR 1.13, PTT 28, BUN 16, Cr 0.75, Glu 110, UA positive nitrites, trace leuk, mod blood, wbc 11-25, rbc 6-10, many bacteria, CXR There are prominent bronchovascular markings at the left lower lung zone.No lobar lung consolidation significant pleural effusion or pneumothorax is noted. EKG sinus tachy @119BPM. Received IV azactam, vanco, tylenol    Overnight events: Patient seen at bedside. Reports improved dysuria, frequency, lower back pain.    MEDICATIONS  (STANDING):  ascorbic acid 1000 milliGRAM(s) Oral daily  calcium carbonate  625 mG + Vitamin D (OsCal 250 + D) 1 Tablet(s) Oral two times a day  cefTRIAXone   IVPB 1 Gram(s) IV Intermittent every 24 hours  cefTRIAXone   IVPB      cholecalciferol 2000 Unit(s) Oral daily  dabigatran 150 milliGRAM(s) Oral every 12 hours  diltiazem    milliGRAM(s) Oral <User Schedule>  docusate sodium 100 milliGRAM(s) Oral at bedtime  ferrous    sulfate 325 milliGRAM(s) Oral daily  ganciclovir 0.15% Ophthalmic Gel 1 Drop(s) Right EYE daily  latanoprost 0.005% Ophthalmic Solution 1 Drop(s) Both EYES at bedtime  mirtazapine 7.5 milliGRAM(s) Oral at bedtime  multivitamin/minerals 1 Tablet(s) Oral daily  mycophenolate mofetil 1000 milliGRAM(s) Oral two times a day  omega-3-Acid Ethyl Esters 2 Gram(s) Oral two times a day  predniSONE   Tablet 40 milliGRAM(s) Oral daily  pyridostigmine 60 milliGRAM(s) Oral <User Schedule>  pyridostigmine  milliGRAM(s) Oral at bedtime  senna 1 Tablet(s) Oral at bedtime  tamsulosin 0.4 milliGRAM(s) Oral at bedtime    MEDICATIONS  (PRN):  acetaminophen   Tablet 650 milliGRAM(s) Oral every 6 hours PRN For Temp greater than 38 C (100.4 F)      Allergies    immune globulin intravenous (Unknown)  penicillins (Rash)    Intolerances        REVIEW OF SYSTEMS:  CONSTITUTIONAL: No fever, or fatigue  RESPIRATORY: No cough, wheezing, chills or hemoptysis; No shortness of breath  CARDIOVASCULAR: No chest pain, palpitations, dizziness, or leg swelling  GASTROINTESTINAL: Improved suprapubic pain. No nausea, vomiting, or hematemesis; No diarrhea or constipation. No melena or hematochezia.  GENITOURINARY: Improved dysuria, frequency. No incontinence  NEUROLOGICAL: No headaches, memory loss, loss of strength, numbness, or tremors  SKIN: No itching, burning, rashes, or lesions   MUSCULOSKELETAL: No joint pain or swelling; No muscle, back, or extremity pain  PSYCHIATRIC: No depression, anxiety, mood swings, or difficulty sleeping    Vital Signs Last 24 Hrs  T(C): 37.1 (04 Dec 2017 14:04), Max: 37.1 (04 Dec 2017 14:04)  T(F): 98.7 (04 Dec 2017 14:04), Max: 98.7 (04 Dec 2017 14:04)  HR: 80 (04 Dec 2017 14:04) (66 - 84)  BP: 129/64 (04 Dec 2017 14:04) (122/69 - 132/60)  BP(mean): --  RR: 17 (04 Dec 2017 14:04) (17 - 18)  SpO2: 98% (04 Dec 2017 14:04) (96% - 100%)    PHYSICAL EXAM:  GENERAL: NAD, well-groomed, well-developed  HEAD:  Atraumatic, Normocephalic  EYES: EOMI, PERRLA, conjunctiva and sclera clear  ENMT: No tonsillar erythema, exudates, or enlargement; Moist mucous membranes,   NECK: Supple, No JVD, Normal thyroid  NERVOUS SYSTEM:  Alert & Oriented X3, Good concentration; Motor Strength 5/5 B/L upper and lower extremities  CHEST/LUNG: Clear to auscultation bilaterally; No rales, rhonchi, wheezing, or rubs  HEART: Regular rate and rhythm; No murmurs, rubs, or gallops  ABDOMEN: Soft, Nontender, Nondistended; Bowel sounds present  EXTREMITIES:  2+ Peripheral Pulses, No clubbing, cyanosis, or edema  LYMPH: No lymphadenopathy noted  SKIN: No rashes or lesions    LABS:                        12.6   21.6  )-----------( 218      ( 04 Dec 2017 06:55 )             39.4     04 Dec 2017 06:55    145    |  110    |  16     ----------------------------<  113    4.0     |  31     |  0.64     Ca    8.9        04 Dec 2017 06:55      PT/INR - ( 03 Dec 2017 12:15 )   PT: 12.3 sec;   INR: 1.13 ratio         PTT - ( 03 Dec 2017 12:15 )  PTT:28.0 sec  Urinalysis Basic - ( 03 Dec 2017 12:46 )    Color: Yellow / Appearance: Clear / S.015 / pH: x  Gluc: x / Ketone: Negative  / Bili: Negative / Urobili: Negative   Blood: x / Protein: Negative / Nitrite: Positive   Leuk Esterase: Trace / RBC: 6-10 /HPF / WBC 11-25   Sq Epi: x / Non Sq Epi: Few / Bacteria: Many none

## 2018-10-01 ENCOUNTER — OUTPATIENT (OUTPATIENT)
Dept: OUTPATIENT SERVICES | Facility: HOSPITAL | Age: 73
LOS: 1 days | End: 2018-10-01
Payer: MEDICARE

## 2018-10-01 DIAGNOSIS — H26.8 OTHER SPECIFIED CATARACT: Chronic | ICD-10-CM

## 2018-10-01 DIAGNOSIS — G70.00 MYASTHENIA GRAVIS WITHOUT (ACUTE) EXACERBATION: ICD-10-CM

## 2018-10-01 DIAGNOSIS — Z98.890 OTHER SPECIFIED POSTPROCEDURAL STATES: Chronic | ICD-10-CM

## 2018-10-01 LAB
HCT VFR BLD CALC: 39.1 % — SIGNIFICANT CHANGE UP (ref 39–50)
HGB BLD-MCNC: 12.8 G/DL — LOW (ref 13–17)
MCHC RBC-ENTMCNC: 30.3 PG — SIGNIFICANT CHANGE UP (ref 27–34)
MCHC RBC-ENTMCNC: 32.9 GM/DL — SIGNIFICANT CHANGE UP (ref 32–36)
MCV RBC AUTO: 92.2 FL — SIGNIFICANT CHANGE UP (ref 80–100)
PLATELET # BLD AUTO: 280 K/UL — SIGNIFICANT CHANGE UP (ref 150–400)
RBC # BLD: 4.23 M/UL — SIGNIFICANT CHANGE UP (ref 4.2–5.8)
RBC # FLD: 13.7 % — SIGNIFICANT CHANGE UP (ref 10.3–14.5)
WBC # BLD: 9.9 K/UL — SIGNIFICANT CHANGE UP (ref 3.8–10.5)
WBC # FLD AUTO: 9.9 K/UL — SIGNIFICANT CHANGE UP (ref 3.8–10.5)

## 2018-10-01 PROCEDURE — 99214 OFFICE O/P EST MOD 30 MIN: CPT | Mod: 25

## 2018-10-01 PROCEDURE — P9041: CPT

## 2018-10-01 PROCEDURE — P9045: CPT

## 2018-10-01 PROCEDURE — 85027 COMPLETE CBC AUTOMATED: CPT

## 2018-10-01 PROCEDURE — 36514 APHERESIS PLASMA: CPT

## 2018-10-03 ENCOUNTER — OUTPATIENT (OUTPATIENT)
Dept: OUTPATIENT SERVICES | Facility: HOSPITAL | Age: 73
LOS: 1 days | End: 2018-10-03
Payer: MEDICARE

## 2018-10-03 DIAGNOSIS — Z98.890 OTHER SPECIFIED POSTPROCEDURAL STATES: Chronic | ICD-10-CM

## 2018-10-03 DIAGNOSIS — G70.00 MYASTHENIA GRAVIS WITHOUT (ACUTE) EXACERBATION: ICD-10-CM

## 2018-10-03 DIAGNOSIS — H26.8 OTHER SPECIFIED CATARACT: Chronic | ICD-10-CM

## 2018-10-03 LAB
HCT VFR BLD CALC: 40.6 % — SIGNIFICANT CHANGE UP (ref 39–50)
HGB BLD-MCNC: 13.3 G/DL — SIGNIFICANT CHANGE UP (ref 13–17)
MCHC RBC-ENTMCNC: 30.1 PG — SIGNIFICANT CHANGE UP (ref 27–34)
MCHC RBC-ENTMCNC: 32.7 GM/DL — SIGNIFICANT CHANGE UP (ref 32–36)
MCV RBC AUTO: 92.3 FL — SIGNIFICANT CHANGE UP (ref 80–100)
PLATELET # BLD AUTO: 258 K/UL — SIGNIFICANT CHANGE UP (ref 150–400)
RBC # BLD: 4.4 M/UL — SIGNIFICANT CHANGE UP (ref 4.2–5.8)
RBC # FLD: 13.7 % — SIGNIFICANT CHANGE UP (ref 10.3–14.5)
WBC # BLD: 11.8 K/UL — HIGH (ref 3.8–10.5)
WBC # FLD AUTO: 11.8 K/UL — HIGH (ref 3.8–10.5)

## 2018-10-03 PROCEDURE — 85027 COMPLETE CBC AUTOMATED: CPT

## 2018-10-03 PROCEDURE — 36514 APHERESIS PLASMA: CPT

## 2018-10-03 PROCEDURE — P9045: CPT

## 2018-10-03 PROCEDURE — P9041: CPT

## 2018-10-03 PROCEDURE — 99214 OFFICE O/P EST MOD 30 MIN: CPT | Mod: 25

## 2018-10-05 ENCOUNTER — OUTPATIENT (OUTPATIENT)
Dept: OUTPATIENT SERVICES | Facility: HOSPITAL | Age: 73
LOS: 1 days | End: 2018-10-05
Payer: MEDICARE

## 2018-10-05 DIAGNOSIS — Z98.890 OTHER SPECIFIED POSTPROCEDURAL STATES: Chronic | ICD-10-CM

## 2018-10-05 DIAGNOSIS — H26.8 OTHER SPECIFIED CATARACT: Chronic | ICD-10-CM

## 2018-10-05 DIAGNOSIS — G70.00 MYASTHENIA GRAVIS WITHOUT (ACUTE) EXACERBATION: ICD-10-CM

## 2018-10-05 LAB
HCT VFR BLD CALC: 38 % — LOW (ref 39–50)
HGB BLD-MCNC: 12.6 G/DL — LOW (ref 13–17)
MCHC RBC-ENTMCNC: 30.7 PG — SIGNIFICANT CHANGE UP (ref 27–34)
MCHC RBC-ENTMCNC: 33.2 GM/DL — SIGNIFICANT CHANGE UP (ref 32–36)
MCV RBC AUTO: 92.4 FL — SIGNIFICANT CHANGE UP (ref 80–100)
PLATELET # BLD AUTO: 261 K/UL — SIGNIFICANT CHANGE UP (ref 150–400)
RBC # BLD: 4.11 M/UL — LOW (ref 4.2–5.8)
RBC # FLD: 13.4 % — SIGNIFICANT CHANGE UP (ref 10.3–14.5)
WBC # BLD: 10.3 K/UL — SIGNIFICANT CHANGE UP (ref 3.8–10.5)
WBC # FLD AUTO: 10.3 K/UL — SIGNIFICANT CHANGE UP (ref 3.8–10.5)

## 2018-10-05 PROCEDURE — 36514 APHERESIS PLASMA: CPT

## 2018-10-05 PROCEDURE — 99214 OFFICE O/P EST MOD 30 MIN: CPT | Mod: 25

## 2018-10-05 PROCEDURE — P9041: CPT

## 2018-10-05 PROCEDURE — 85027 COMPLETE CBC AUTOMATED: CPT

## 2018-10-05 NOTE — ED ADULT TRIAGE NOTE - WEIGHT IN LBS
I will START or STAY ON the medications listed below when I get home from the hospital:    valsartan 320 mg oral tablet  -- 1 tab(s) by mouth once a day  -- Indication: For Essential hypertension    Flomax 0.4 mg oral capsule  -- 2 cap(s) by mouth once a day  -- Indication: For Benign prostatic hypertrophy    metFORMIN 1000 mg oral tablet  -- 1 tab(s) by mouth 2 times a day  -- Indication: For Diabetes    NovoLOG Mix 70/30 FlexPen subcutaneous suspension  -- 20 unit(s) subcutaneous 2 times a day  -- Indication: For Diabetes    atorvastatin 40 mg oral tablet  -- 1 tab(s) by mouth once a day  -- Indication: For High Cholesterol    Tenormin 50 mg oral tablet  -- 1 tab(s) by mouth once a day  -- Indication: For Essential hypertension    felodipine 5 mg oral tablet, extended release  -- 1 tab(s) by mouth once a day  -- Indication: For Essential hypertension    Lasix 40 mg oral tablet  -- 1 tab(s) by mouth once a day  -- Indication: For Essential hypertension    omeprazole 40 mg oral delayed release capsule  -- 1 cap(s) by mouth 2 times a day (before meals)   -- It is very important that you take or use this exactly as directed.  Do not skip doses or discontinue unless directed by your doctor.  Obtain medical advice before taking any non-prescription drugs as some may affect the action of this medication.  Swallow whole.  Do not crush.    -- Indication: For Gastric Ulcers 166.8

## 2018-11-07 ENCOUNTER — OUTPATIENT (OUTPATIENT)
Dept: OUTPATIENT SERVICES | Facility: HOSPITAL | Age: 73
LOS: 1 days | End: 2018-11-07
Payer: MEDICARE

## 2018-11-07 DIAGNOSIS — Z98.890 OTHER SPECIFIED POSTPROCEDURAL STATES: Chronic | ICD-10-CM

## 2018-11-07 DIAGNOSIS — G70.00 MYASTHENIA GRAVIS WITHOUT (ACUTE) EXACERBATION: ICD-10-CM

## 2018-11-07 DIAGNOSIS — H26.8 OTHER SPECIFIED CATARACT: Chronic | ICD-10-CM

## 2018-11-07 LAB
HCT VFR BLD CALC: 39.1 % — SIGNIFICANT CHANGE UP (ref 39–50)
HGB BLD-MCNC: 12.8 G/DL — LOW (ref 13–17)
MCHC RBC-ENTMCNC: 30.2 PG — SIGNIFICANT CHANGE UP (ref 27–34)
MCHC RBC-ENTMCNC: 32.7 GM/DL — SIGNIFICANT CHANGE UP (ref 32–36)
MCV RBC AUTO: 92.2 FL — SIGNIFICANT CHANGE UP (ref 80–100)
PLATELET # BLD AUTO: 289 K/UL — SIGNIFICANT CHANGE UP (ref 150–400)
RBC # BLD: 4.25 M/UL — SIGNIFICANT CHANGE UP (ref 4.2–5.8)
RBC # FLD: 13.2 % — SIGNIFICANT CHANGE UP (ref 10.3–14.5)
WBC # BLD: 10.4 K/UL — SIGNIFICANT CHANGE UP (ref 3.8–10.5)
WBC # FLD AUTO: 10.4 K/UL — SIGNIFICANT CHANGE UP (ref 3.8–10.5)

## 2018-11-07 PROCEDURE — P9041: CPT

## 2018-11-07 PROCEDURE — 36514 APHERESIS PLASMA: CPT

## 2018-11-07 PROCEDURE — 85027 COMPLETE CBC AUTOMATED: CPT

## 2018-11-07 PROCEDURE — 99214 OFFICE O/P EST MOD 30 MIN: CPT | Mod: 25

## 2018-11-09 ENCOUNTER — OUTPATIENT (OUTPATIENT)
Dept: OUTPATIENT SERVICES | Facility: HOSPITAL | Age: 73
LOS: 1 days | End: 2018-11-09
Payer: MEDICARE

## 2018-11-09 DIAGNOSIS — H26.8 OTHER SPECIFIED CATARACT: Chronic | ICD-10-CM

## 2018-11-09 DIAGNOSIS — Z98.890 OTHER SPECIFIED POSTPROCEDURAL STATES: Chronic | ICD-10-CM

## 2018-11-09 DIAGNOSIS — G70.00 MYASTHENIA GRAVIS WITHOUT (ACUTE) EXACERBATION: ICD-10-CM

## 2018-11-09 LAB
HCT VFR BLD CALC: 41.3 % — SIGNIFICANT CHANGE UP (ref 39–50)
HGB BLD-MCNC: 13.3 G/DL — SIGNIFICANT CHANGE UP (ref 13–17)
MCHC RBC-ENTMCNC: 29.6 PG — SIGNIFICANT CHANGE UP (ref 27–34)
MCHC RBC-ENTMCNC: 32.2 GM/DL — SIGNIFICANT CHANGE UP (ref 32–36)
MCV RBC AUTO: 92.1 FL — SIGNIFICANT CHANGE UP (ref 80–100)
PLATELET # BLD AUTO: 293 K/UL — SIGNIFICANT CHANGE UP (ref 150–400)
RBC # BLD: 4.49 M/UL — SIGNIFICANT CHANGE UP (ref 4.2–5.8)
RBC # FLD: 13.6 % — SIGNIFICANT CHANGE UP (ref 10.3–14.5)
WBC # BLD: 11.5 K/UL — HIGH (ref 3.8–10.5)
WBC # FLD AUTO: 11.5 K/UL — HIGH (ref 3.8–10.5)

## 2018-11-09 PROCEDURE — P9045: CPT

## 2018-11-09 PROCEDURE — 36514 APHERESIS PLASMA: CPT

## 2018-11-09 PROCEDURE — P9041: CPT

## 2018-11-09 PROCEDURE — 85027 COMPLETE CBC AUTOMATED: CPT

## 2018-11-09 PROCEDURE — 99214 OFFICE O/P EST MOD 30 MIN: CPT | Mod: 25

## 2018-11-12 ENCOUNTER — OUTPATIENT (OUTPATIENT)
Dept: OUTPATIENT SERVICES | Facility: HOSPITAL | Age: 73
LOS: 1 days | End: 2018-11-12
Payer: MEDICARE

## 2018-11-12 DIAGNOSIS — H26.8 OTHER SPECIFIED CATARACT: Chronic | ICD-10-CM

## 2018-11-12 DIAGNOSIS — Z98.890 OTHER SPECIFIED POSTPROCEDURAL STATES: Chronic | ICD-10-CM

## 2018-11-12 DIAGNOSIS — G70.00 MYASTHENIA GRAVIS WITHOUT (ACUTE) EXACERBATION: ICD-10-CM

## 2018-11-12 LAB
HCT VFR BLD CALC: 41 % — SIGNIFICANT CHANGE UP (ref 39–50)
HGB BLD-MCNC: 13.2 G/DL — SIGNIFICANT CHANGE UP (ref 13–17)
MCHC RBC-ENTMCNC: 29.6 PG — SIGNIFICANT CHANGE UP (ref 27–34)
MCHC RBC-ENTMCNC: 32.3 GM/DL — SIGNIFICANT CHANGE UP (ref 32–36)
MCV RBC AUTO: 91.8 FL — SIGNIFICANT CHANGE UP (ref 80–100)
PLATELET # BLD AUTO: 300 K/UL — SIGNIFICANT CHANGE UP (ref 150–400)
RBC # BLD: 4.47 M/UL — SIGNIFICANT CHANGE UP (ref 4.2–5.8)
RBC # FLD: 13.4 % — SIGNIFICANT CHANGE UP (ref 10.3–14.5)
WBC # BLD: 12.5 K/UL — HIGH (ref 3.8–10.5)
WBC # FLD AUTO: 12.5 K/UL — HIGH (ref 3.8–10.5)

## 2018-11-12 PROCEDURE — 36514 APHERESIS PLASMA: CPT

## 2018-11-12 PROCEDURE — 99214 OFFICE O/P EST MOD 30 MIN: CPT | Mod: 25

## 2018-11-12 PROCEDURE — P9045: CPT

## 2018-11-12 PROCEDURE — 85027 COMPLETE CBC AUTOMATED: CPT

## 2018-11-12 PROCEDURE — P9041: CPT

## 2018-12-17 ENCOUNTER — OUTPATIENT (OUTPATIENT)
Dept: OUTPATIENT SERVICES | Facility: HOSPITAL | Age: 73
LOS: 1 days | End: 2018-12-17
Payer: MEDICARE

## 2018-12-17 DIAGNOSIS — Z98.890 OTHER SPECIFIED POSTPROCEDURAL STATES: Chronic | ICD-10-CM

## 2018-12-17 DIAGNOSIS — H26.8 OTHER SPECIFIED CATARACT: Chronic | ICD-10-CM

## 2018-12-17 DIAGNOSIS — G70.00 MYASTHENIA GRAVIS WITHOUT (ACUTE) EXACERBATION: ICD-10-CM

## 2018-12-17 LAB
CA-I BLD-SCNC: 1.25 MMOL/L — SIGNIFICANT CHANGE UP (ref 1.12–1.3)
HCT VFR BLD CALC: 40.9 % — SIGNIFICANT CHANGE UP (ref 39–50)
HGB BLD-MCNC: 13.8 G/DL — SIGNIFICANT CHANGE UP (ref 13–17)
MCHC RBC-ENTMCNC: 31.3 PG — SIGNIFICANT CHANGE UP (ref 27–34)
MCHC RBC-ENTMCNC: 33.7 GM/DL — SIGNIFICANT CHANGE UP (ref 32–36)
MCV RBC AUTO: 92.9 FL — SIGNIFICANT CHANGE UP (ref 80–100)
PLATELET # BLD AUTO: 262 K/UL — SIGNIFICANT CHANGE UP (ref 150–400)
RBC # BLD: 4.4 M/UL — SIGNIFICANT CHANGE UP (ref 4.2–5.8)
RBC # FLD: 13.3 % — SIGNIFICANT CHANGE UP (ref 10.3–14.5)
WBC # BLD: 9 K/UL — SIGNIFICANT CHANGE UP (ref 3.8–10.5)
WBC # FLD AUTO: 9 K/UL — SIGNIFICANT CHANGE UP (ref 3.8–10.5)

## 2018-12-17 PROCEDURE — 36514 APHERESIS PLASMA: CPT

## 2018-12-17 PROCEDURE — 99214 OFFICE O/P EST MOD 30 MIN: CPT | Mod: 25

## 2018-12-17 PROCEDURE — 85027 COMPLETE CBC AUTOMATED: CPT

## 2018-12-17 PROCEDURE — 82330 ASSAY OF CALCIUM: CPT

## 2018-12-17 PROCEDURE — P9041: CPT

## 2018-12-17 PROCEDURE — P9045: CPT

## 2018-12-19 ENCOUNTER — OUTPATIENT (OUTPATIENT)
Dept: OUTPATIENT SERVICES | Facility: HOSPITAL | Age: 73
LOS: 1 days | End: 2018-12-19
Payer: MEDICARE

## 2018-12-19 DIAGNOSIS — H26.8 OTHER SPECIFIED CATARACT: Chronic | ICD-10-CM

## 2018-12-19 DIAGNOSIS — Z98.890 OTHER SPECIFIED POSTPROCEDURAL STATES: Chronic | ICD-10-CM

## 2018-12-19 DIAGNOSIS — G70.00 MYASTHENIA GRAVIS WITHOUT (ACUTE) EXACERBATION: ICD-10-CM

## 2018-12-19 LAB
CA-I BLD-SCNC: 1.22 MMOL/L — SIGNIFICANT CHANGE UP (ref 1.12–1.3)
HCT VFR BLD CALC: 41.8 % — SIGNIFICANT CHANGE UP (ref 39–50)
HGB BLD-MCNC: 14.3 G/DL — SIGNIFICANT CHANGE UP (ref 13–17)
MCHC RBC-ENTMCNC: 31.9 PG — SIGNIFICANT CHANGE UP (ref 27–34)
MCHC RBC-ENTMCNC: 34.2 GM/DL — SIGNIFICANT CHANGE UP (ref 32–36)
MCV RBC AUTO: 93.4 FL — SIGNIFICANT CHANGE UP (ref 80–100)
PLATELET # BLD AUTO: 276 K/UL — SIGNIFICANT CHANGE UP (ref 150–400)
RBC # BLD: 4.47 M/UL — SIGNIFICANT CHANGE UP (ref 4.2–5.8)
RBC # FLD: 13.6 % — SIGNIFICANT CHANGE UP (ref 10.3–14.5)
WBC # BLD: 9.9 K/UL — SIGNIFICANT CHANGE UP (ref 3.8–10.5)
WBC # FLD AUTO: 9.9 K/UL — SIGNIFICANT CHANGE UP (ref 3.8–10.5)

## 2018-12-19 PROCEDURE — P9045: CPT

## 2018-12-19 PROCEDURE — P9041: CPT

## 2018-12-19 PROCEDURE — 99214 OFFICE O/P EST MOD 30 MIN: CPT | Mod: 25

## 2018-12-19 PROCEDURE — 85027 COMPLETE CBC AUTOMATED: CPT

## 2018-12-19 PROCEDURE — 36514 APHERESIS PLASMA: CPT

## 2018-12-19 PROCEDURE — 82330 ASSAY OF CALCIUM: CPT

## 2018-12-21 ENCOUNTER — OUTPATIENT (OUTPATIENT)
Dept: OUTPATIENT SERVICES | Facility: HOSPITAL | Age: 73
LOS: 1 days | End: 2018-12-21
Payer: MEDICARE

## 2018-12-21 DIAGNOSIS — Z98.890 OTHER SPECIFIED POSTPROCEDURAL STATES: Chronic | ICD-10-CM

## 2018-12-21 DIAGNOSIS — G70.00 MYASTHENIA GRAVIS WITHOUT (ACUTE) EXACERBATION: ICD-10-CM

## 2018-12-21 DIAGNOSIS — H26.8 OTHER SPECIFIED CATARACT: Chronic | ICD-10-CM

## 2018-12-21 LAB
CA-I BLD-SCNC: 1.18 MMOL/L — SIGNIFICANT CHANGE UP (ref 1.12–1.3)
HCT VFR BLD CALC: 41.7 % — SIGNIFICANT CHANGE UP (ref 39–50)
HGB BLD-MCNC: 13.6 G/DL — SIGNIFICANT CHANGE UP (ref 13–17)
MCHC RBC-ENTMCNC: 30.2 PG — SIGNIFICANT CHANGE UP (ref 27–34)
MCHC RBC-ENTMCNC: 32.6 GM/DL — SIGNIFICANT CHANGE UP (ref 32–36)
MCV RBC AUTO: 92.6 FL — SIGNIFICANT CHANGE UP (ref 80–100)
PLATELET # BLD AUTO: 283 K/UL — SIGNIFICANT CHANGE UP (ref 150–400)
RBC # BLD: 4.5 M/UL — SIGNIFICANT CHANGE UP (ref 4.2–5.8)
RBC # FLD: 12.9 % — SIGNIFICANT CHANGE UP (ref 10.3–14.5)
WBC # BLD: 10.2 K/UL — SIGNIFICANT CHANGE UP (ref 3.8–10.5)
WBC # FLD AUTO: 10.2 K/UL — SIGNIFICANT CHANGE UP (ref 3.8–10.5)

## 2018-12-21 PROCEDURE — 85027 COMPLETE CBC AUTOMATED: CPT

## 2018-12-21 PROCEDURE — P9041: CPT

## 2018-12-21 PROCEDURE — 36514 APHERESIS PLASMA: CPT

## 2018-12-21 PROCEDURE — 82330 ASSAY OF CALCIUM: CPT

## 2018-12-21 PROCEDURE — P9045: CPT

## 2018-12-21 PROCEDURE — 99214 OFFICE O/P EST MOD 30 MIN: CPT | Mod: 25

## 2019-01-28 ENCOUNTER — OUTPATIENT (OUTPATIENT)
Dept: OUTPATIENT SERVICES | Facility: HOSPITAL | Age: 74
LOS: 1 days | End: 2019-01-28
Payer: MEDICARE

## 2019-01-28 DIAGNOSIS — G70.00 MYASTHENIA GRAVIS WITHOUT (ACUTE) EXACERBATION: ICD-10-CM

## 2019-01-28 DIAGNOSIS — Z98.890 OTHER SPECIFIED POSTPROCEDURAL STATES: Chronic | ICD-10-CM

## 2019-01-28 DIAGNOSIS — H26.8 OTHER SPECIFIED CATARACT: Chronic | ICD-10-CM

## 2019-01-28 LAB
CA-I BLD-SCNC: 1.19 MMOL/L — SIGNIFICANT CHANGE UP (ref 1.12–1.3)
HCT VFR BLD CALC: 40.4 % — SIGNIFICANT CHANGE UP (ref 39–50)
HGB BLD-MCNC: 13.7 G/DL — SIGNIFICANT CHANGE UP (ref 13–17)
MCHC RBC-ENTMCNC: 31 PG — SIGNIFICANT CHANGE UP (ref 27–34)
MCHC RBC-ENTMCNC: 34 GM/DL — SIGNIFICANT CHANGE UP (ref 32–36)
MCV RBC AUTO: 91.2 FL — SIGNIFICANT CHANGE UP (ref 80–100)
PLATELET # BLD AUTO: 278 K/UL — SIGNIFICANT CHANGE UP (ref 150–400)
RBC # BLD: 4.43 M/UL — SIGNIFICANT CHANGE UP (ref 4.2–5.8)
RBC # FLD: 12.3 % — SIGNIFICANT CHANGE UP (ref 10.3–14.5)
WBC # BLD: 9 K/UL — SIGNIFICANT CHANGE UP (ref 3.8–10.5)
WBC # FLD AUTO: 9 K/UL — SIGNIFICANT CHANGE UP (ref 3.8–10.5)

## 2019-01-28 PROCEDURE — P9041: CPT

## 2019-01-28 PROCEDURE — 36514 APHERESIS PLASMA: CPT

## 2019-01-28 PROCEDURE — P9045: CPT

## 2019-01-28 PROCEDURE — 82330 ASSAY OF CALCIUM: CPT

## 2019-01-28 PROCEDURE — 85027 COMPLETE CBC AUTOMATED: CPT

## 2019-01-30 ENCOUNTER — OUTPATIENT (OUTPATIENT)
Dept: OUTPATIENT SERVICES | Facility: HOSPITAL | Age: 74
LOS: 1 days | End: 2019-01-30
Payer: MEDICARE

## 2019-01-30 DIAGNOSIS — H26.8 OTHER SPECIFIED CATARACT: Chronic | ICD-10-CM

## 2019-01-30 DIAGNOSIS — G70.00 MYASTHENIA GRAVIS WITHOUT (ACUTE) EXACERBATION: ICD-10-CM

## 2019-01-30 DIAGNOSIS — Z98.890 OTHER SPECIFIED POSTPROCEDURAL STATES: Chronic | ICD-10-CM

## 2019-01-30 LAB
CA-I BLD-SCNC: 1.21 MMOL/L — SIGNIFICANT CHANGE UP (ref 1.12–1.3)
HCT VFR BLD CALC: 41.4 % — SIGNIFICANT CHANGE UP (ref 39–50)
HGB BLD-MCNC: 13.9 G/DL — SIGNIFICANT CHANGE UP (ref 13–17)
MCHC RBC-ENTMCNC: 30.5 PG — SIGNIFICANT CHANGE UP (ref 27–34)
MCHC RBC-ENTMCNC: 33.5 GM/DL — SIGNIFICANT CHANGE UP (ref 32–36)
MCV RBC AUTO: 91 FL — SIGNIFICANT CHANGE UP (ref 80–100)
PLATELET # BLD AUTO: 268 K/UL — SIGNIFICANT CHANGE UP (ref 150–400)
RBC # BLD: 4.55 M/UL — SIGNIFICANT CHANGE UP (ref 4.2–5.8)
RBC # FLD: 12.4 % — SIGNIFICANT CHANGE UP (ref 10.3–14.5)
WBC # BLD: 9.4 K/UL — SIGNIFICANT CHANGE UP (ref 3.8–10.5)
WBC # FLD AUTO: 9.4 K/UL — SIGNIFICANT CHANGE UP (ref 3.8–10.5)

## 2019-01-30 PROCEDURE — P9041: CPT

## 2019-01-30 PROCEDURE — 36514 APHERESIS PLASMA: CPT

## 2019-01-30 PROCEDURE — P9045: CPT

## 2019-01-30 PROCEDURE — 82330 ASSAY OF CALCIUM: CPT

## 2019-01-30 PROCEDURE — 85027 COMPLETE CBC AUTOMATED: CPT

## 2019-02-01 ENCOUNTER — OUTPATIENT (OUTPATIENT)
Dept: OUTPATIENT SERVICES | Facility: HOSPITAL | Age: 74
LOS: 1 days | End: 2019-02-01
Payer: MEDICARE

## 2019-02-01 DIAGNOSIS — H26.8 OTHER SPECIFIED CATARACT: Chronic | ICD-10-CM

## 2019-02-01 DIAGNOSIS — G70.00 MYASTHENIA GRAVIS WITHOUT (ACUTE) EXACERBATION: ICD-10-CM

## 2019-02-01 DIAGNOSIS — Z98.890 OTHER SPECIFIED POSTPROCEDURAL STATES: Chronic | ICD-10-CM

## 2019-02-01 LAB
CA-I BLD-SCNC: 1.23 MMOL/L — SIGNIFICANT CHANGE UP (ref 1.12–1.3)
HCT VFR BLD CALC: 38.9 % — LOW (ref 39–50)
HGB BLD-MCNC: 13.4 G/DL — SIGNIFICANT CHANGE UP (ref 13–17)
MCHC RBC-ENTMCNC: 31.5 PG — SIGNIFICANT CHANGE UP (ref 27–34)
MCHC RBC-ENTMCNC: 34.4 GM/DL — SIGNIFICANT CHANGE UP (ref 32–36)
MCV RBC AUTO: 91.4 FL — SIGNIFICANT CHANGE UP (ref 80–100)
PLATELET # BLD AUTO: 276 K/UL — SIGNIFICANT CHANGE UP (ref 150–400)
RBC # BLD: 4.26 M/UL — SIGNIFICANT CHANGE UP (ref 4.2–5.8)
RBC # FLD: 12.9 % — SIGNIFICANT CHANGE UP (ref 10.3–14.5)
WBC # BLD: 12.4 K/UL — HIGH (ref 3.8–10.5)
WBC # FLD AUTO: 12.4 K/UL — HIGH (ref 3.8–10.5)

## 2019-02-01 PROCEDURE — P9041: CPT

## 2019-02-01 PROCEDURE — 82330 ASSAY OF CALCIUM: CPT

## 2019-02-01 PROCEDURE — 36514 APHERESIS PLASMA: CPT

## 2019-02-01 PROCEDURE — 85027 COMPLETE CBC AUTOMATED: CPT

## 2019-03-02 NOTE — PATIENT PROFILE ADULT. - PT NEEDS ASSIST
DELMY KIM  : 1976  10/05/18 16:17:25  ACCOUNT:  [id number]  HOME PHONE:  [phone]  WORK PHONE:  [phone]  24 hr holter abnormal with two short atrial triplets, rare PAC and rare PVC.  test will be reviewed at next ov with Dr. Yen.   pt notified of results and cpm. lc     no

## 2019-03-11 ENCOUNTER — OUTPATIENT (OUTPATIENT)
Dept: OUTPATIENT SERVICES | Facility: HOSPITAL | Age: 74
LOS: 1 days | End: 2019-03-11
Payer: MEDICARE

## 2019-03-11 DIAGNOSIS — G70.00 MYASTHENIA GRAVIS WITHOUT (ACUTE) EXACERBATION: ICD-10-CM

## 2019-03-11 DIAGNOSIS — Z98.890 OTHER SPECIFIED POSTPROCEDURAL STATES: Chronic | ICD-10-CM

## 2019-03-11 DIAGNOSIS — H26.8 OTHER SPECIFIED CATARACT: Chronic | ICD-10-CM

## 2019-03-11 LAB
HCT VFR BLD CALC: 38.9 % — LOW (ref 39–50)
HGB BLD-MCNC: 13.2 G/DL — SIGNIFICANT CHANGE UP (ref 13–17)
MCHC RBC-ENTMCNC: 31 PG — SIGNIFICANT CHANGE UP (ref 27–34)
MCHC RBC-ENTMCNC: 34 GM/DL — SIGNIFICANT CHANGE UP (ref 32–36)
MCV RBC AUTO: 91.1 FL — SIGNIFICANT CHANGE UP (ref 80–100)
PLATELET # BLD AUTO: 242 K/UL — SIGNIFICANT CHANGE UP (ref 150–400)
RBC # BLD: 4.27 M/UL — SIGNIFICANT CHANGE UP (ref 4.2–5.8)
RBC # FLD: 12 % — SIGNIFICANT CHANGE UP (ref 10.3–14.5)
WBC # BLD: 8.4 K/UL — SIGNIFICANT CHANGE UP (ref 3.8–10.5)
WBC # FLD AUTO: 8.4 K/UL — SIGNIFICANT CHANGE UP (ref 3.8–10.5)

## 2019-03-11 PROCEDURE — 36514 APHERESIS PLASMA: CPT

## 2019-03-11 PROCEDURE — 85027 COMPLETE CBC AUTOMATED: CPT

## 2019-03-11 PROCEDURE — P9041: CPT

## 2019-03-13 ENCOUNTER — OUTPATIENT (OUTPATIENT)
Dept: OUTPATIENT SERVICES | Facility: HOSPITAL | Age: 74
LOS: 1 days | End: 2019-03-13
Payer: MEDICARE

## 2019-03-13 DIAGNOSIS — H26.8 OTHER SPECIFIED CATARACT: Chronic | ICD-10-CM

## 2019-03-13 DIAGNOSIS — G70.00 MYASTHENIA GRAVIS WITHOUT (ACUTE) EXACERBATION: ICD-10-CM

## 2019-03-13 DIAGNOSIS — Z98.890 OTHER SPECIFIED POSTPROCEDURAL STATES: Chronic | ICD-10-CM

## 2019-03-13 LAB
HCT VFR BLD CALC: 38.8 % — LOW (ref 39–50)
HGB BLD-MCNC: 13.3 G/DL — SIGNIFICANT CHANGE UP (ref 13–17)
MCHC RBC-ENTMCNC: 31.2 PG — SIGNIFICANT CHANGE UP (ref 27–34)
MCHC RBC-ENTMCNC: 34.4 GM/DL — SIGNIFICANT CHANGE UP (ref 32–36)
MCV RBC AUTO: 90.7 FL — SIGNIFICANT CHANGE UP (ref 80–100)
PLATELET # BLD AUTO: 258 K/UL — SIGNIFICANT CHANGE UP (ref 150–400)
RBC # BLD: 4.28 M/UL — SIGNIFICANT CHANGE UP (ref 4.2–5.8)
RBC # FLD: 11.9 % — SIGNIFICANT CHANGE UP (ref 10.3–14.5)
WBC # BLD: 9.2 K/UL — SIGNIFICANT CHANGE UP (ref 3.8–10.5)
WBC # FLD AUTO: 9.2 K/UL — SIGNIFICANT CHANGE UP (ref 3.8–10.5)

## 2019-03-13 PROCEDURE — 36514 APHERESIS PLASMA: CPT

## 2019-03-13 PROCEDURE — P9041: CPT

## 2019-03-13 PROCEDURE — 85027 COMPLETE CBC AUTOMATED: CPT

## 2019-03-15 ENCOUNTER — OUTPATIENT (OUTPATIENT)
Dept: OUTPATIENT SERVICES | Facility: HOSPITAL | Age: 74
LOS: 1 days | End: 2019-03-15
Payer: MEDICARE

## 2019-03-15 DIAGNOSIS — Z98.890 OTHER SPECIFIED POSTPROCEDURAL STATES: Chronic | ICD-10-CM

## 2019-03-15 DIAGNOSIS — G70.00 MYASTHENIA GRAVIS WITHOUT (ACUTE) EXACERBATION: ICD-10-CM

## 2019-03-15 DIAGNOSIS — H26.8 OTHER SPECIFIED CATARACT: Chronic | ICD-10-CM

## 2019-03-15 LAB
HCT VFR BLD CALC: 38.1 % — LOW (ref 39–50)
HGB BLD-MCNC: 12.9 G/DL — LOW (ref 13–17)
MCHC RBC-ENTMCNC: 31.2 PG — SIGNIFICANT CHANGE UP (ref 27–34)
MCHC RBC-ENTMCNC: 34 GM/DL — SIGNIFICANT CHANGE UP (ref 32–36)
MCV RBC AUTO: 91.9 FL — SIGNIFICANT CHANGE UP (ref 80–100)
PLATELET # BLD AUTO: 305 K/UL — SIGNIFICANT CHANGE UP (ref 150–400)
RBC # BLD: 4.15 M/UL — LOW (ref 4.2–5.8)
RBC # FLD: 12.5 % — SIGNIFICANT CHANGE UP (ref 10.3–14.5)
WBC # BLD: 9.4 K/UL — SIGNIFICANT CHANGE UP (ref 3.8–10.5)
WBC # FLD AUTO: 9.4 K/UL — SIGNIFICANT CHANGE UP (ref 3.8–10.5)

## 2019-03-15 PROCEDURE — 36514 APHERESIS PLASMA: CPT

## 2019-03-15 PROCEDURE — P9041: CPT

## 2019-03-15 PROCEDURE — 85027 COMPLETE CBC AUTOMATED: CPT

## 2019-03-28 ENCOUNTER — TRANSCRIPTION ENCOUNTER (OUTPATIENT)
Age: 74
End: 2019-03-28

## 2019-04-24 ENCOUNTER — OUTPATIENT (OUTPATIENT)
Dept: OUTPATIENT SERVICES | Facility: HOSPITAL | Age: 74
LOS: 1 days | End: 2019-04-24
Payer: MEDICARE

## 2019-04-24 DIAGNOSIS — Z98.890 OTHER SPECIFIED POSTPROCEDURAL STATES: Chronic | ICD-10-CM

## 2019-04-24 DIAGNOSIS — G70.00 MYASTHENIA GRAVIS WITHOUT (ACUTE) EXACERBATION: ICD-10-CM

## 2019-04-24 DIAGNOSIS — H26.8 OTHER SPECIFIED CATARACT: Chronic | ICD-10-CM

## 2019-04-24 LAB
CA-I BLD-SCNC: 1.23 MMOL/L — SIGNIFICANT CHANGE UP (ref 1.12–1.3)
HCT VFR BLD CALC: 40.9 % — SIGNIFICANT CHANGE UP (ref 39–50)
HGB BLD-MCNC: 13.6 G/DL — SIGNIFICANT CHANGE UP (ref 13–17)
MCHC RBC-ENTMCNC: 30.7 PG — SIGNIFICANT CHANGE UP (ref 27–34)
MCHC RBC-ENTMCNC: 33.3 GM/DL — SIGNIFICANT CHANGE UP (ref 32–36)
MCV RBC AUTO: 92.3 FL — SIGNIFICANT CHANGE UP (ref 80–100)
PLATELET # BLD AUTO: 267 K/UL — SIGNIFICANT CHANGE UP (ref 150–400)
RBC # BLD: 4.43 M/UL — SIGNIFICANT CHANGE UP (ref 4.2–5.8)
RBC # FLD: 12.2 % — SIGNIFICANT CHANGE UP (ref 10.3–14.5)
WBC # BLD: 8.4 K/UL — SIGNIFICANT CHANGE UP (ref 3.8–10.5)
WBC # FLD AUTO: 8.4 K/UL — SIGNIFICANT CHANGE UP (ref 3.8–10.5)

## 2019-04-24 PROCEDURE — 36514 APHERESIS PLASMA: CPT

## 2019-04-24 PROCEDURE — P9041: CPT

## 2019-04-24 PROCEDURE — 85027 COMPLETE CBC AUTOMATED: CPT

## 2019-04-24 PROCEDURE — 82330 ASSAY OF CALCIUM: CPT

## 2019-04-26 ENCOUNTER — OUTPATIENT (OUTPATIENT)
Dept: OUTPATIENT SERVICES | Facility: HOSPITAL | Age: 74
LOS: 1 days | End: 2019-04-26
Payer: MEDICARE

## 2019-04-26 DIAGNOSIS — G70.00 MYASTHENIA GRAVIS WITHOUT (ACUTE) EXACERBATION: ICD-10-CM

## 2019-04-26 DIAGNOSIS — Z98.890 OTHER SPECIFIED POSTPROCEDURAL STATES: Chronic | ICD-10-CM

## 2019-04-26 DIAGNOSIS — H26.8 OTHER SPECIFIED CATARACT: Chronic | ICD-10-CM

## 2019-04-26 LAB
CA-I BLD-SCNC: 1.08 MMOL/L — LOW (ref 1.12–1.3)
HCT VFR BLD CALC: 41.1 % — SIGNIFICANT CHANGE UP (ref 39–50)
HGB BLD-MCNC: 13.4 G/DL — SIGNIFICANT CHANGE UP (ref 13–17)
MCHC RBC-ENTMCNC: 30.1 PG — SIGNIFICANT CHANGE UP (ref 27–34)
MCHC RBC-ENTMCNC: 32.6 GM/DL — SIGNIFICANT CHANGE UP (ref 32–36)
MCV RBC AUTO: 92.3 FL — SIGNIFICANT CHANGE UP (ref 80–100)
PLATELET # BLD AUTO: 235 K/UL — SIGNIFICANT CHANGE UP (ref 150–400)
RBC # BLD: 4.46 M/UL — SIGNIFICANT CHANGE UP (ref 4.2–5.8)
RBC # FLD: 12.2 % — SIGNIFICANT CHANGE UP (ref 10.3–14.5)
WBC # BLD: 11.3 K/UL — HIGH (ref 3.8–10.5)
WBC # FLD AUTO: 11.3 K/UL — HIGH (ref 3.8–10.5)

## 2019-04-26 PROCEDURE — P9041: CPT

## 2019-04-26 PROCEDURE — 36514 APHERESIS PLASMA: CPT

## 2019-04-26 PROCEDURE — 82330 ASSAY OF CALCIUM: CPT

## 2019-04-26 PROCEDURE — 85027 COMPLETE CBC AUTOMATED: CPT

## 2019-04-26 PROCEDURE — P9045: CPT

## 2019-04-29 ENCOUNTER — OUTPATIENT (OUTPATIENT)
Dept: OUTPATIENT SERVICES | Facility: HOSPITAL | Age: 74
LOS: 1 days | End: 2019-04-29
Payer: MEDICARE

## 2019-04-29 DIAGNOSIS — Z98.890 OTHER SPECIFIED POSTPROCEDURAL STATES: Chronic | ICD-10-CM

## 2019-04-29 DIAGNOSIS — H26.8 OTHER SPECIFIED CATARACT: Chronic | ICD-10-CM

## 2019-04-29 DIAGNOSIS — G70.00 MYASTHENIA GRAVIS WITHOUT (ACUTE) EXACERBATION: ICD-10-CM

## 2019-04-29 LAB
CA-I BLD-SCNC: 1.22 MMOL/L — SIGNIFICANT CHANGE UP (ref 1.12–1.3)
HCT VFR BLD CALC: 39 % — SIGNIFICANT CHANGE UP (ref 39–50)
HGB BLD-MCNC: 13.2 G/DL — SIGNIFICANT CHANGE UP (ref 13–17)
MCHC RBC-ENTMCNC: 31.3 PG — SIGNIFICANT CHANGE UP (ref 27–34)
MCHC RBC-ENTMCNC: 33.9 GM/DL — SIGNIFICANT CHANGE UP (ref 32–36)
MCV RBC AUTO: 92.4 FL — SIGNIFICANT CHANGE UP (ref 80–100)
PLATELET # BLD AUTO: 268 K/UL — SIGNIFICANT CHANGE UP (ref 150–400)
RBC # BLD: 4.23 M/UL — SIGNIFICANT CHANGE UP (ref 4.2–5.8)
RBC # FLD: 12.2 % — SIGNIFICANT CHANGE UP (ref 10.3–14.5)
WBC # BLD: 7.9 K/UL — SIGNIFICANT CHANGE UP (ref 3.8–10.5)
WBC # FLD AUTO: 7.9 K/UL — SIGNIFICANT CHANGE UP (ref 3.8–10.5)

## 2019-04-29 PROCEDURE — 85027 COMPLETE CBC AUTOMATED: CPT

## 2019-04-29 PROCEDURE — 36514 APHERESIS PLASMA: CPT

## 2019-04-29 PROCEDURE — P9041: CPT

## 2019-04-29 PROCEDURE — P9045: CPT

## 2019-04-29 PROCEDURE — 82330 ASSAY OF CALCIUM: CPT

## 2019-06-10 ENCOUNTER — OUTPATIENT (OUTPATIENT)
Dept: OUTPATIENT SERVICES | Facility: HOSPITAL | Age: 74
LOS: 1 days | End: 2019-06-10
Payer: MEDICARE

## 2019-06-10 DIAGNOSIS — Z98.890 OTHER SPECIFIED POSTPROCEDURAL STATES: Chronic | ICD-10-CM

## 2019-06-10 DIAGNOSIS — H26.8 OTHER SPECIFIED CATARACT: Chronic | ICD-10-CM

## 2019-06-10 DIAGNOSIS — G70.00 MYASTHENIA GRAVIS WITHOUT (ACUTE) EXACERBATION: ICD-10-CM

## 2019-06-10 LAB
CA-I BLD-SCNC: 1.24 MMOL/L — SIGNIFICANT CHANGE UP (ref 1.12–1.3)
HCT VFR BLD CALC: 40.4 % — SIGNIFICANT CHANGE UP (ref 39–50)
HGB BLD-MCNC: 13.6 G/DL — SIGNIFICANT CHANGE UP (ref 13–17)
MCHC RBC-ENTMCNC: 30.8 PG — SIGNIFICANT CHANGE UP (ref 27–34)
MCHC RBC-ENTMCNC: 33.6 GM/DL — SIGNIFICANT CHANGE UP (ref 32–36)
MCV RBC AUTO: 91.6 FL — SIGNIFICANT CHANGE UP (ref 80–100)
PLATELET # BLD AUTO: 233 K/UL — SIGNIFICANT CHANGE UP (ref 150–400)
RBC # BLD: 4.41 M/UL — SIGNIFICANT CHANGE UP (ref 4.2–5.8)
RBC # FLD: 12.4 % — SIGNIFICANT CHANGE UP (ref 10.3–14.5)
WBC # BLD: 7.3 K/UL — SIGNIFICANT CHANGE UP (ref 3.8–10.5)
WBC # FLD AUTO: 7.3 K/UL — SIGNIFICANT CHANGE UP (ref 3.8–10.5)

## 2019-06-10 PROCEDURE — P9041: CPT

## 2019-06-10 PROCEDURE — P9045: CPT

## 2019-06-10 PROCEDURE — 36514 APHERESIS PLASMA: CPT

## 2019-06-10 PROCEDURE — 85027 COMPLETE CBC AUTOMATED: CPT

## 2019-06-10 PROCEDURE — 82330 ASSAY OF CALCIUM: CPT

## 2019-06-12 ENCOUNTER — OUTPATIENT (OUTPATIENT)
Dept: OUTPATIENT SERVICES | Facility: HOSPITAL | Age: 74
LOS: 1 days | End: 2019-06-12
Payer: MEDICARE

## 2019-06-12 DIAGNOSIS — Z98.890 OTHER SPECIFIED POSTPROCEDURAL STATES: Chronic | ICD-10-CM

## 2019-06-12 DIAGNOSIS — H26.8 OTHER SPECIFIED CATARACT: Chronic | ICD-10-CM

## 2019-06-12 DIAGNOSIS — G70.00 MYASTHENIA GRAVIS WITHOUT (ACUTE) EXACERBATION: ICD-10-CM

## 2019-06-12 LAB
CA-I BLD-SCNC: 1.2 MMOL/L — SIGNIFICANT CHANGE UP (ref 1.12–1.3)
HCT VFR BLD CALC: 39.6 % — SIGNIFICANT CHANGE UP (ref 39–50)
HGB BLD-MCNC: 13.5 G/DL — SIGNIFICANT CHANGE UP (ref 13–17)
MCHC RBC-ENTMCNC: 31.5 PG — SIGNIFICANT CHANGE UP (ref 27–34)
MCHC RBC-ENTMCNC: 34.1 GM/DL — SIGNIFICANT CHANGE UP (ref 32–36)
MCV RBC AUTO: 92.4 FL — SIGNIFICANT CHANGE UP (ref 80–100)
PLATELET # BLD AUTO: 250 K/UL — SIGNIFICANT CHANGE UP (ref 150–400)
RBC # BLD: 4.29 M/UL — SIGNIFICANT CHANGE UP (ref 4.2–5.8)
RBC # FLD: 12.4 % — SIGNIFICANT CHANGE UP (ref 10.3–14.5)
WBC # BLD: 7.6 K/UL — SIGNIFICANT CHANGE UP (ref 3.8–10.5)
WBC # FLD AUTO: 7.6 K/UL — SIGNIFICANT CHANGE UP (ref 3.8–10.5)

## 2019-06-12 PROCEDURE — 36514 APHERESIS PLASMA: CPT

## 2019-06-12 PROCEDURE — 82330 ASSAY OF CALCIUM: CPT

## 2019-06-12 PROCEDURE — 85027 COMPLETE CBC AUTOMATED: CPT

## 2019-06-12 PROCEDURE — P9041: CPT

## 2019-06-14 ENCOUNTER — OUTPATIENT (OUTPATIENT)
Dept: OUTPATIENT SERVICES | Facility: HOSPITAL | Age: 74
LOS: 1 days | End: 2019-06-14
Payer: MEDICARE

## 2019-06-14 DIAGNOSIS — H26.8 OTHER SPECIFIED CATARACT: Chronic | ICD-10-CM

## 2019-06-14 DIAGNOSIS — G70.00 MYASTHENIA GRAVIS WITHOUT (ACUTE) EXACERBATION: ICD-10-CM

## 2019-06-14 DIAGNOSIS — Z98.890 OTHER SPECIFIED POSTPROCEDURAL STATES: Chronic | ICD-10-CM

## 2019-06-14 LAB
CA-I BLD-SCNC: 1.22 MMOL/L — SIGNIFICANT CHANGE UP (ref 1.12–1.3)
HCT VFR BLD CALC: 39.9 % — SIGNIFICANT CHANGE UP (ref 39–50)
HGB BLD-MCNC: 13.6 G/DL — SIGNIFICANT CHANGE UP (ref 13–17)
MCHC RBC-ENTMCNC: 31.2 PG — SIGNIFICANT CHANGE UP (ref 27–34)
MCHC RBC-ENTMCNC: 34.1 GM/DL — SIGNIFICANT CHANGE UP (ref 32–36)
MCV RBC AUTO: 91.4 FL — SIGNIFICANT CHANGE UP (ref 80–100)
PLATELET # BLD AUTO: 266 K/UL — SIGNIFICANT CHANGE UP (ref 150–400)
RBC # BLD: 4.37 M/UL — SIGNIFICANT CHANGE UP (ref 4.2–5.8)
RBC # FLD: 12.4 % — SIGNIFICANT CHANGE UP (ref 10.3–14.5)
WBC # BLD: 7.4 K/UL — SIGNIFICANT CHANGE UP (ref 3.8–10.5)
WBC # FLD AUTO: 7.4 K/UL — SIGNIFICANT CHANGE UP (ref 3.8–10.5)

## 2019-06-14 PROCEDURE — 82330 ASSAY OF CALCIUM: CPT

## 2019-06-14 PROCEDURE — 85027 COMPLETE CBC AUTOMATED: CPT

## 2019-06-14 PROCEDURE — P9041: CPT

## 2019-06-14 PROCEDURE — 36514 APHERESIS PLASMA: CPT

## 2019-07-29 ENCOUNTER — OUTPATIENT (OUTPATIENT)
Dept: OUTPATIENT SERVICES | Facility: HOSPITAL | Age: 74
LOS: 1 days | End: 2019-07-29
Payer: MEDICARE

## 2019-07-29 DIAGNOSIS — Z98.890 OTHER SPECIFIED POSTPROCEDURAL STATES: Chronic | ICD-10-CM

## 2019-07-29 DIAGNOSIS — H26.8 OTHER SPECIFIED CATARACT: Chronic | ICD-10-CM

## 2019-07-29 DIAGNOSIS — G70.00 MYASTHENIA GRAVIS WITHOUT (ACUTE) EXACERBATION: ICD-10-CM

## 2019-07-29 LAB
CA-I BLD-SCNC: 1.22 MMOL/L — SIGNIFICANT CHANGE UP (ref 1.12–1.3)
HCT VFR BLD CALC: 38.1 % — LOW (ref 39–50)
HGB BLD-MCNC: 13.1 G/DL — SIGNIFICANT CHANGE UP (ref 13–17)
MCHC RBC-ENTMCNC: 31.7 PG — SIGNIFICANT CHANGE UP (ref 27–34)
MCHC RBC-ENTMCNC: 34.5 GM/DL — SIGNIFICANT CHANGE UP (ref 32–36)
MCV RBC AUTO: 92 FL — SIGNIFICANT CHANGE UP (ref 80–100)
PLATELET # BLD AUTO: 277 K/UL — SIGNIFICANT CHANGE UP (ref 150–400)
RBC # BLD: 4.14 M/UL — LOW (ref 4.2–5.8)
RBC # FLD: 12.7 % — SIGNIFICANT CHANGE UP (ref 10.3–14.5)
WBC # BLD: 8.5 K/UL — SIGNIFICANT CHANGE UP (ref 3.8–10.5)
WBC # FLD AUTO: 8.5 K/UL — SIGNIFICANT CHANGE UP (ref 3.8–10.5)

## 2019-07-29 PROCEDURE — P9041: CPT

## 2019-07-29 PROCEDURE — 36514 APHERESIS PLASMA: CPT

## 2019-07-29 PROCEDURE — 82330 ASSAY OF CALCIUM: CPT

## 2019-07-29 PROCEDURE — P9045: CPT

## 2019-07-29 PROCEDURE — 85027 COMPLETE CBC AUTOMATED: CPT

## 2019-07-31 ENCOUNTER — OUTPATIENT (OUTPATIENT)
Dept: OUTPATIENT SERVICES | Facility: HOSPITAL | Age: 74
LOS: 1 days | End: 2019-07-31
Payer: MEDICARE

## 2019-07-31 DIAGNOSIS — Z98.890 OTHER SPECIFIED POSTPROCEDURAL STATES: Chronic | ICD-10-CM

## 2019-07-31 DIAGNOSIS — H26.8 OTHER SPECIFIED CATARACT: Chronic | ICD-10-CM

## 2019-07-31 DIAGNOSIS — G70.00 MYASTHENIA GRAVIS WITHOUT (ACUTE) EXACERBATION: ICD-10-CM

## 2019-07-31 LAB
CA-I BLD-SCNC: 1.23 MMOL/L — SIGNIFICANT CHANGE UP (ref 1.12–1.3)
HCT VFR BLD CALC: 40.1 % — SIGNIFICANT CHANGE UP (ref 39–50)
HGB BLD-MCNC: 13.4 G/DL — SIGNIFICANT CHANGE UP (ref 13–17)
MCHC RBC-ENTMCNC: 30.8 PG — SIGNIFICANT CHANGE UP (ref 27–34)
MCHC RBC-ENTMCNC: 33.3 GM/DL — SIGNIFICANT CHANGE UP (ref 32–36)
MCV RBC AUTO: 92.6 FL — SIGNIFICANT CHANGE UP (ref 80–100)
PLATELET # BLD AUTO: 263 K/UL — SIGNIFICANT CHANGE UP (ref 150–400)
RBC # BLD: 4.33 M/UL — SIGNIFICANT CHANGE UP (ref 4.2–5.8)
RBC # FLD: 12.9 % — SIGNIFICANT CHANGE UP (ref 10.3–14.5)
WBC # BLD: 7.7 K/UL — SIGNIFICANT CHANGE UP (ref 3.8–10.5)
WBC # FLD AUTO: 7.7 K/UL — SIGNIFICANT CHANGE UP (ref 3.8–10.5)

## 2019-07-31 PROCEDURE — 36514 APHERESIS PLASMA: CPT

## 2019-07-31 PROCEDURE — P9041: CPT

## 2019-07-31 PROCEDURE — 85027 COMPLETE CBC AUTOMATED: CPT

## 2019-07-31 PROCEDURE — 82330 ASSAY OF CALCIUM: CPT

## 2019-08-02 ENCOUNTER — OUTPATIENT (OUTPATIENT)
Dept: OUTPATIENT SERVICES | Facility: HOSPITAL | Age: 74
LOS: 1 days | End: 2019-08-02
Payer: MEDICARE

## 2019-08-02 DIAGNOSIS — Z98.890 OTHER SPECIFIED POSTPROCEDURAL STATES: Chronic | ICD-10-CM

## 2019-08-02 DIAGNOSIS — G70.00 MYASTHENIA GRAVIS WITHOUT (ACUTE) EXACERBATION: ICD-10-CM

## 2019-08-02 DIAGNOSIS — H26.8 OTHER SPECIFIED CATARACT: Chronic | ICD-10-CM

## 2019-08-02 LAB
CA-I BLD-SCNC: 1.25 MMOL/L — SIGNIFICANT CHANGE UP (ref 1.12–1.3)
HCT VFR BLD CALC: 38.6 % — LOW (ref 39–50)
HGB BLD-MCNC: 13.5 G/DL — SIGNIFICANT CHANGE UP (ref 13–17)
MCHC RBC-ENTMCNC: 32.2 PG — SIGNIFICANT CHANGE UP (ref 27–34)
MCHC RBC-ENTMCNC: 35 GM/DL — SIGNIFICANT CHANGE UP (ref 32–36)
MCV RBC AUTO: 92.1 FL — SIGNIFICANT CHANGE UP (ref 80–100)
PLATELET # BLD AUTO: 241 K/UL — SIGNIFICANT CHANGE UP (ref 150–400)
RBC # BLD: 4.19 M/UL — LOW (ref 4.2–5.8)
RBC # FLD: 12.8 % — SIGNIFICANT CHANGE UP (ref 10.3–14.5)
WBC # BLD: 8.4 K/UL — SIGNIFICANT CHANGE UP (ref 3.8–10.5)
WBC # FLD AUTO: 8.4 K/UL — SIGNIFICANT CHANGE UP (ref 3.8–10.5)

## 2019-08-02 PROCEDURE — 36514 APHERESIS PLASMA: CPT

## 2019-08-02 PROCEDURE — P9045: CPT

## 2019-08-02 PROCEDURE — P9041: CPT

## 2019-08-02 PROCEDURE — 82330 ASSAY OF CALCIUM: CPT

## 2019-08-02 PROCEDURE — 85027 COMPLETE CBC AUTOMATED: CPT

## 2019-09-20 ENCOUNTER — OUTPATIENT (OUTPATIENT)
Dept: OUTPATIENT SERVICES | Facility: HOSPITAL | Age: 74
LOS: 1 days | End: 2019-09-20
Payer: MEDICARE

## 2019-09-20 DIAGNOSIS — H26.8 OTHER SPECIFIED CATARACT: Chronic | ICD-10-CM

## 2019-09-20 DIAGNOSIS — G70.00 MYASTHENIA GRAVIS WITHOUT (ACUTE) EXACERBATION: ICD-10-CM

## 2019-09-20 DIAGNOSIS — Z98.890 OTHER SPECIFIED POSTPROCEDURAL STATES: Chronic | ICD-10-CM

## 2019-09-20 LAB
CA-I BLD-SCNC: 1.24 MMOL/L — SIGNIFICANT CHANGE UP (ref 1.12–1.3)
HCT VFR BLD CALC: 44.3 % — SIGNIFICANT CHANGE UP (ref 39–50)
HGB BLD-MCNC: 13.7 G/DL — SIGNIFICANT CHANGE UP (ref 13–17)
MCHC RBC-ENTMCNC: 29.4 PG — SIGNIFICANT CHANGE UP (ref 27–34)
MCHC RBC-ENTMCNC: 31 GM/DL — LOW (ref 32–36)
MCV RBC AUTO: 94.6 FL — SIGNIFICANT CHANGE UP (ref 80–100)
PLATELET # BLD AUTO: 288 K/UL — SIGNIFICANT CHANGE UP (ref 150–400)
RBC # BLD: 4.68 M/UL — SIGNIFICANT CHANGE UP (ref 4.2–5.8)
RBC # FLD: 12.5 % — SIGNIFICANT CHANGE UP (ref 10.3–14.5)
WBC # BLD: 8.7 K/UL — SIGNIFICANT CHANGE UP (ref 3.8–10.5)
WBC # FLD AUTO: 8.7 K/UL — SIGNIFICANT CHANGE UP (ref 3.8–10.5)

## 2019-09-20 PROCEDURE — P9041: CPT

## 2019-09-20 PROCEDURE — 36514 APHERESIS PLASMA: CPT

## 2019-09-20 PROCEDURE — 85027 COMPLETE CBC AUTOMATED: CPT

## 2019-09-20 PROCEDURE — 82330 ASSAY OF CALCIUM: CPT

## 2019-09-23 ENCOUNTER — OUTPATIENT (OUTPATIENT)
Dept: OUTPATIENT SERVICES | Facility: HOSPITAL | Age: 74
LOS: 1 days | End: 2019-09-23
Payer: MEDICARE

## 2019-09-23 DIAGNOSIS — Z98.890 OTHER SPECIFIED POSTPROCEDURAL STATES: Chronic | ICD-10-CM

## 2019-09-23 DIAGNOSIS — H26.8 OTHER SPECIFIED CATARACT: Chronic | ICD-10-CM

## 2019-09-23 DIAGNOSIS — G70.00 MYASTHENIA GRAVIS WITHOUT (ACUTE) EXACERBATION: ICD-10-CM

## 2019-09-23 LAB
CA-I BLD-SCNC: 1.19 MMOL/L — SIGNIFICANT CHANGE UP (ref 1.12–1.3)
HCT VFR BLD CALC: 40.3 % — SIGNIFICANT CHANGE UP (ref 39–50)
HGB BLD-MCNC: 12.8 G/DL — LOW (ref 13–17)
MCHC RBC-ENTMCNC: 30.1 PG — SIGNIFICANT CHANGE UP (ref 27–34)
MCHC RBC-ENTMCNC: 31.6 GM/DL — LOW (ref 32–36)
MCV RBC AUTO: 95.2 FL — SIGNIFICANT CHANGE UP (ref 80–100)
PLATELET # BLD AUTO: 299 K/UL — SIGNIFICANT CHANGE UP (ref 150–400)
RBC # BLD: 4.24 M/UL — SIGNIFICANT CHANGE UP (ref 4.2–5.8)
RBC # FLD: 12.6 % — SIGNIFICANT CHANGE UP (ref 10.3–14.5)
WBC # BLD: 8.8 K/UL — SIGNIFICANT CHANGE UP (ref 3.8–10.5)
WBC # FLD AUTO: 8.8 K/UL — SIGNIFICANT CHANGE UP (ref 3.8–10.5)

## 2019-09-23 PROCEDURE — 36514 APHERESIS PLASMA: CPT

## 2019-09-23 PROCEDURE — P9041: CPT

## 2019-09-23 PROCEDURE — 82330 ASSAY OF CALCIUM: CPT

## 2019-09-23 PROCEDURE — 85027 COMPLETE CBC AUTOMATED: CPT

## 2019-09-25 ENCOUNTER — OUTPATIENT (OUTPATIENT)
Dept: OUTPATIENT SERVICES | Facility: HOSPITAL | Age: 74
LOS: 1 days | End: 2019-09-25
Payer: MEDICARE

## 2019-09-25 DIAGNOSIS — H26.8 OTHER SPECIFIED CATARACT: Chronic | ICD-10-CM

## 2019-09-25 DIAGNOSIS — Z98.890 OTHER SPECIFIED POSTPROCEDURAL STATES: Chronic | ICD-10-CM

## 2019-09-25 DIAGNOSIS — G70.00 MYASTHENIA GRAVIS WITHOUT (ACUTE) EXACERBATION: ICD-10-CM

## 2019-09-25 LAB
ALBUMIN SERPL ELPH-MCNC: 4.4 G/DL — SIGNIFICANT CHANGE UP (ref 3.3–5)
ALP SERPL-CCNC: 50 U/L — SIGNIFICANT CHANGE UP (ref 40–120)
ALT FLD-CCNC: 7 U/L — LOW (ref 10–45)
ANION GAP SERPL CALC-SCNC: 12 MMOL/L — SIGNIFICANT CHANGE UP (ref 5–17)
AST SERPL-CCNC: 11 U/L — SIGNIFICANT CHANGE UP (ref 10–40)
BILIRUB SERPL-MCNC: 0.3 MG/DL — SIGNIFICANT CHANGE UP (ref 0.2–1.2)
BUN SERPL-MCNC: 13 MG/DL — SIGNIFICANT CHANGE UP (ref 7–23)
CA-I BLD-SCNC: 1.31 MMOL/L — HIGH (ref 1.12–1.3)
CALCIUM SERPL-MCNC: 9.1 MG/DL — SIGNIFICANT CHANGE UP (ref 8.4–10.5)
CHLORIDE SERPL-SCNC: 107 MMOL/L — SIGNIFICANT CHANGE UP (ref 96–108)
CO2 SERPL-SCNC: 24 MMOL/L — SIGNIFICANT CHANGE UP (ref 22–31)
CREAT SERPL-MCNC: 0.65 MG/DL — SIGNIFICANT CHANGE UP (ref 0.5–1.3)
GLUCOSE SERPL-MCNC: 94 MG/DL — SIGNIFICANT CHANGE UP (ref 70–99)
HCT VFR BLD CALC: 40.9 % — SIGNIFICANT CHANGE UP (ref 39–50)
HGB BLD-MCNC: 12.9 G/DL — LOW (ref 13–17)
MCHC RBC-ENTMCNC: 30.2 PG — SIGNIFICANT CHANGE UP (ref 27–34)
MCHC RBC-ENTMCNC: 31.6 GM/DL — LOW (ref 32–36)
MCV RBC AUTO: 95.5 FL — SIGNIFICANT CHANGE UP (ref 80–100)
PLATELET # BLD AUTO: 285 K/UL — SIGNIFICANT CHANGE UP (ref 150–400)
POTASSIUM SERPL-MCNC: 3.7 MMOL/L — SIGNIFICANT CHANGE UP (ref 3.5–5.3)
POTASSIUM SERPL-SCNC: 3.7 MMOL/L — SIGNIFICANT CHANGE UP (ref 3.5–5.3)
PROT SERPL-MCNC: 5.6 G/DL — LOW (ref 6–8.3)
RBC # BLD: 4.29 M/UL — SIGNIFICANT CHANGE UP (ref 4.2–5.8)
RBC # FLD: 12.8 % — SIGNIFICANT CHANGE UP (ref 10.3–14.5)
SODIUM SERPL-SCNC: 143 MMOL/L — SIGNIFICANT CHANGE UP (ref 135–145)
WBC # BLD: 8.4 K/UL — SIGNIFICANT CHANGE UP (ref 3.8–10.5)
WBC # FLD AUTO: 8.4 K/UL — SIGNIFICANT CHANGE UP (ref 3.8–10.5)

## 2019-09-25 PROCEDURE — 85027 COMPLETE CBC AUTOMATED: CPT

## 2019-09-25 PROCEDURE — 36514 APHERESIS PLASMA: CPT

## 2019-09-25 PROCEDURE — 82330 ASSAY OF CALCIUM: CPT

## 2019-09-25 PROCEDURE — P9041: CPT

## 2019-09-25 PROCEDURE — 80053 COMPREHEN METABOLIC PANEL: CPT

## 2019-11-15 ENCOUNTER — OUTPATIENT (OUTPATIENT)
Dept: OUTPATIENT SERVICES | Facility: HOSPITAL | Age: 74
LOS: 1 days | End: 2019-11-15
Payer: MEDICARE

## 2019-11-15 DIAGNOSIS — G70.00 MYASTHENIA GRAVIS WITHOUT (ACUTE) EXACERBATION: ICD-10-CM

## 2019-11-15 DIAGNOSIS — Z98.890 OTHER SPECIFIED POSTPROCEDURAL STATES: Chronic | ICD-10-CM

## 2019-11-15 DIAGNOSIS — H26.8 OTHER SPECIFIED CATARACT: Chronic | ICD-10-CM

## 2019-11-15 LAB
CA-I BLD-SCNC: 1 MMOL/L — LOW (ref 1.12–1.3)
HCT VFR BLD CALC: 39.8 % — SIGNIFICANT CHANGE UP (ref 39–50)
HGB BLD-MCNC: 13.1 G/DL — SIGNIFICANT CHANGE UP (ref 13–17)
MCHC RBC-ENTMCNC: 30 PG — SIGNIFICANT CHANGE UP (ref 27–34)
MCHC RBC-ENTMCNC: 32.9 GM/DL — SIGNIFICANT CHANGE UP (ref 32–36)
MCV RBC AUTO: 91.3 FL — SIGNIFICANT CHANGE UP (ref 80–100)
NRBC # BLD: 0 /100 WBCS — SIGNIFICANT CHANGE UP (ref 0–0)
PLATELET # BLD AUTO: 246 K/UL — SIGNIFICANT CHANGE UP (ref 150–400)
RBC # BLD: 4.36 M/UL — SIGNIFICANT CHANGE UP (ref 4.2–5.8)
RBC # FLD: 12.8 % — SIGNIFICANT CHANGE UP (ref 10.3–14.5)
WBC # BLD: 7.02 K/UL — SIGNIFICANT CHANGE UP (ref 3.8–10.5)
WBC # FLD AUTO: 7.02 K/UL — SIGNIFICANT CHANGE UP (ref 3.8–10.5)

## 2019-11-15 PROCEDURE — 36514 APHERESIS PLASMA: CPT

## 2019-11-15 PROCEDURE — 85027 COMPLETE CBC AUTOMATED: CPT

## 2019-11-15 PROCEDURE — 82330 ASSAY OF CALCIUM: CPT

## 2019-11-15 PROCEDURE — P9041: CPT

## 2019-11-18 ENCOUNTER — OUTPATIENT (OUTPATIENT)
Dept: OUTPATIENT SERVICES | Facility: HOSPITAL | Age: 74
LOS: 1 days | End: 2019-11-18
Payer: MEDICARE

## 2019-11-18 DIAGNOSIS — G70.00 MYASTHENIA GRAVIS WITHOUT (ACUTE) EXACERBATION: ICD-10-CM

## 2019-11-18 DIAGNOSIS — Z98.890 OTHER SPECIFIED POSTPROCEDURAL STATES: Chronic | ICD-10-CM

## 2019-11-18 DIAGNOSIS — H26.8 OTHER SPECIFIED CATARACT: Chronic | ICD-10-CM

## 2019-11-18 LAB
CA-I BLD-SCNC: 1.21 MMOL/L — SIGNIFICANT CHANGE UP (ref 1.12–1.3)
HCT VFR BLD CALC: 38.7 % — LOW (ref 39–50)
HGB BLD-MCNC: 13 G/DL — SIGNIFICANT CHANGE UP (ref 13–17)
MCHC RBC-ENTMCNC: 30.7 PG — SIGNIFICANT CHANGE UP (ref 27–34)
MCHC RBC-ENTMCNC: 33.6 GM/DL — SIGNIFICANT CHANGE UP (ref 32–36)
MCV RBC AUTO: 91.3 FL — SIGNIFICANT CHANGE UP (ref 80–100)
NRBC # BLD: 0 /100 WBCS — SIGNIFICANT CHANGE UP (ref 0–0)
PLATELET # BLD AUTO: 239 K/UL — SIGNIFICANT CHANGE UP (ref 150–400)
RBC # BLD: 4.24 M/UL — SIGNIFICANT CHANGE UP (ref 4.2–5.8)
RBC # FLD: 13 % — SIGNIFICANT CHANGE UP (ref 10.3–14.5)
WBC # BLD: 6.54 K/UL — SIGNIFICANT CHANGE UP (ref 3.8–10.5)
WBC # FLD AUTO: 6.54 K/UL — SIGNIFICANT CHANGE UP (ref 3.8–10.5)

## 2019-11-18 PROCEDURE — 36514 APHERESIS PLASMA: CPT

## 2019-11-18 PROCEDURE — 82330 ASSAY OF CALCIUM: CPT

## 2019-11-18 PROCEDURE — P9045: CPT

## 2019-11-18 PROCEDURE — 85027 COMPLETE CBC AUTOMATED: CPT

## 2019-11-20 ENCOUNTER — OUTPATIENT (OUTPATIENT)
Dept: OUTPATIENT SERVICES | Facility: HOSPITAL | Age: 74
LOS: 1 days | End: 2019-11-20
Payer: MEDICARE

## 2019-11-20 DIAGNOSIS — H26.8 OTHER SPECIFIED CATARACT: Chronic | ICD-10-CM

## 2019-11-20 DIAGNOSIS — G70.00 MYASTHENIA GRAVIS WITHOUT (ACUTE) EXACERBATION: ICD-10-CM

## 2019-11-20 DIAGNOSIS — Z98.890 OTHER SPECIFIED POSTPROCEDURAL STATES: Chronic | ICD-10-CM

## 2019-11-20 LAB
CA-I BLD-SCNC: 1.16 MMOL/L — SIGNIFICANT CHANGE UP (ref 1.12–1.3)
HCT VFR BLD CALC: 38.2 % — LOW (ref 39–50)
HGB BLD-MCNC: 13 G/DL — SIGNIFICANT CHANGE UP (ref 13–17)
MCHC RBC-ENTMCNC: 30.9 PG — SIGNIFICANT CHANGE UP (ref 27–34)
MCHC RBC-ENTMCNC: 34 GM/DL — SIGNIFICANT CHANGE UP (ref 32–36)
MCV RBC AUTO: 90.7 FL — SIGNIFICANT CHANGE UP (ref 80–100)
NRBC # BLD: 0 /100 WBCS — SIGNIFICANT CHANGE UP (ref 0–0)
PLATELET # BLD AUTO: 230 K/UL — SIGNIFICANT CHANGE UP (ref 150–400)
RBC # BLD: 4.21 M/UL — SIGNIFICANT CHANGE UP (ref 4.2–5.8)
RBC # FLD: 13.1 % — SIGNIFICANT CHANGE UP (ref 10.3–14.5)
WBC # BLD: 7.24 K/UL — SIGNIFICANT CHANGE UP (ref 3.8–10.5)
WBC # FLD AUTO: 7.24 K/UL — SIGNIFICANT CHANGE UP (ref 3.8–10.5)

## 2019-11-20 PROCEDURE — 36514 APHERESIS PLASMA: CPT

## 2019-11-20 PROCEDURE — 85027 COMPLETE CBC AUTOMATED: CPT

## 2019-11-20 PROCEDURE — P9041: CPT

## 2019-11-20 PROCEDURE — P9045: CPT

## 2019-11-20 PROCEDURE — 82330 ASSAY OF CALCIUM: CPT

## 2019-12-20 ENCOUNTER — OUTPATIENT (OUTPATIENT)
Dept: OUTPATIENT SERVICES | Facility: HOSPITAL | Age: 74
LOS: 1 days | End: 2019-12-20
Payer: MEDICARE

## 2019-12-20 DIAGNOSIS — H26.8 OTHER SPECIFIED CATARACT: Chronic | ICD-10-CM

## 2019-12-20 DIAGNOSIS — Z98.890 OTHER SPECIFIED POSTPROCEDURAL STATES: Chronic | ICD-10-CM

## 2019-12-20 DIAGNOSIS — G70.00 MYASTHENIA GRAVIS WITHOUT (ACUTE) EXACERBATION: ICD-10-CM

## 2019-12-20 PROCEDURE — 96523 IRRIG DRUG DELIVERY DEVICE: CPT

## 2020-01-07 ENCOUNTER — OUTPATIENT (OUTPATIENT)
Dept: OUTPATIENT SERVICES | Facility: HOSPITAL | Age: 75
LOS: 1 days | End: 2020-01-07
Payer: MEDICARE

## 2020-01-07 VITALS
HEART RATE: 76 BPM | TEMPERATURE: 98 F | WEIGHT: 168.43 LBS | RESPIRATION RATE: 16 BRPM | OXYGEN SATURATION: 99 % | SYSTOLIC BLOOD PRESSURE: 127 MMHG | HEIGHT: 67 IN | DIASTOLIC BLOOD PRESSURE: 69 MMHG

## 2020-01-07 DIAGNOSIS — H26.8 OTHER SPECIFIED CATARACT: Chronic | ICD-10-CM

## 2020-01-07 DIAGNOSIS — Z95.818 PRESENCE OF OTHER CARDIAC IMPLANTS AND GRAFTS: Chronic | ICD-10-CM

## 2020-01-07 DIAGNOSIS — Z98.890 OTHER SPECIFIED POSTPROCEDURAL STATES: Chronic | ICD-10-CM

## 2020-01-07 DIAGNOSIS — K40.90 UNILATERAL INGUINAL HERNIA, WITHOUT OBSTRUCTION OR GANGRENE, NOT SPECIFIED AS RECURRENT: ICD-10-CM

## 2020-01-07 DIAGNOSIS — Z95.828 PRESENCE OF OTHER VASCULAR IMPLANTS AND GRAFTS: Chronic | ICD-10-CM

## 2020-01-07 DIAGNOSIS — Z01.818 ENCOUNTER FOR OTHER PREPROCEDURAL EXAMINATION: ICD-10-CM

## 2020-01-07 LAB
ALBUMIN SERPL ELPH-MCNC: 3.7 G/DL — SIGNIFICANT CHANGE UP (ref 3.3–5)
ALP SERPL-CCNC: 86 U/L — SIGNIFICANT CHANGE UP (ref 40–120)
ALT FLD-CCNC: 31 U/L — SIGNIFICANT CHANGE UP (ref 12–78)
ANION GAP SERPL CALC-SCNC: 7 MMOL/L — SIGNIFICANT CHANGE UP (ref 5–17)
APPEARANCE UR: ABNORMAL
AST SERPL-CCNC: 29 U/L — SIGNIFICANT CHANGE UP (ref 15–37)
BASOPHILS # BLD AUTO: 0.03 K/UL — SIGNIFICANT CHANGE UP (ref 0–0.2)
BASOPHILS NFR BLD AUTO: 0.3 % — SIGNIFICANT CHANGE UP (ref 0–2)
BILIRUB SERPL-MCNC: 0.2 MG/DL — SIGNIFICANT CHANGE UP (ref 0.2–1.2)
BILIRUB UR-MCNC: NEGATIVE — SIGNIFICANT CHANGE UP
BUN SERPL-MCNC: 20 MG/DL — SIGNIFICANT CHANGE UP (ref 7–23)
CALCIUM SERPL-MCNC: 8.8 MG/DL — SIGNIFICANT CHANGE UP (ref 8.5–10.1)
CHLORIDE SERPL-SCNC: 109 MMOL/L — HIGH (ref 96–108)
CO2 SERPL-SCNC: 27 MMOL/L — SIGNIFICANT CHANGE UP (ref 22–31)
COLOR SPEC: SIGNIFICANT CHANGE UP
CREAT SERPL-MCNC: 0.7 MG/DL — SIGNIFICANT CHANGE UP (ref 0.5–1.3)
DIFF PNL FLD: NEGATIVE — SIGNIFICANT CHANGE UP
EOSINOPHIL # BLD AUTO: 0.07 K/UL — SIGNIFICANT CHANGE UP (ref 0–0.5)
EOSINOPHIL NFR BLD AUTO: 0.8 % — SIGNIFICANT CHANGE UP (ref 0–6)
GLUCOSE SERPL-MCNC: 86 MG/DL — SIGNIFICANT CHANGE UP (ref 70–99)
GLUCOSE UR QL: NEGATIVE — SIGNIFICANT CHANGE UP
HCT VFR BLD CALC: 43 % — SIGNIFICANT CHANGE UP (ref 39–50)
HGB BLD-MCNC: 14.1 G/DL — SIGNIFICANT CHANGE UP (ref 13–17)
IMM GRANULOCYTES NFR BLD AUTO: 0.5 % — SIGNIFICANT CHANGE UP (ref 0–1.5)
KETONES UR-MCNC: NEGATIVE — SIGNIFICANT CHANGE UP
LEUKOCYTE ESTERASE UR-ACNC: NEGATIVE — SIGNIFICANT CHANGE UP
LYMPHOCYTES # BLD AUTO: 1.29 K/UL — SIGNIFICANT CHANGE UP (ref 1–3.3)
LYMPHOCYTES # BLD AUTO: 14.5 % — SIGNIFICANT CHANGE UP (ref 13–44)
MCHC RBC-ENTMCNC: 30.3 PG — SIGNIFICANT CHANGE UP (ref 27–34)
MCHC RBC-ENTMCNC: 32.8 GM/DL — SIGNIFICANT CHANGE UP (ref 32–36)
MCV RBC AUTO: 92.3 FL — SIGNIFICANT CHANGE UP (ref 80–100)
MONOCYTES # BLD AUTO: 0.67 K/UL — SIGNIFICANT CHANGE UP (ref 0–0.9)
MONOCYTES NFR BLD AUTO: 7.5 % — SIGNIFICANT CHANGE UP (ref 2–14)
NEUTROPHILS # BLD AUTO: 6.78 K/UL — SIGNIFICANT CHANGE UP (ref 1.8–7.4)
NEUTROPHILS NFR BLD AUTO: 76.4 % — SIGNIFICANT CHANGE UP (ref 43–77)
NITRITE UR-MCNC: NEGATIVE — SIGNIFICANT CHANGE UP
NRBC # BLD: 0 /100 WBCS — SIGNIFICANT CHANGE UP (ref 0–0)
PH UR: 6 — SIGNIFICANT CHANGE UP (ref 5–8)
PLATELET # BLD AUTO: 302 K/UL — SIGNIFICANT CHANGE UP (ref 150–400)
POTASSIUM SERPL-MCNC: 4.2 MMOL/L — SIGNIFICANT CHANGE UP (ref 3.5–5.3)
POTASSIUM SERPL-SCNC: 4.2 MMOL/L — SIGNIFICANT CHANGE UP (ref 3.5–5.3)
PROT SERPL-MCNC: 6.4 G/DL — SIGNIFICANT CHANGE UP (ref 6–8.3)
PROT UR-MCNC: NEGATIVE — SIGNIFICANT CHANGE UP
RBC # BLD: 4.66 M/UL — SIGNIFICANT CHANGE UP (ref 4.2–5.8)
RBC # FLD: 12.7 % — SIGNIFICANT CHANGE UP (ref 10.3–14.5)
SODIUM SERPL-SCNC: 143 MMOL/L — SIGNIFICANT CHANGE UP (ref 135–145)
SP GR SPEC: 1 — LOW (ref 1.01–1.02)
UROBILINOGEN FLD QL: NEGATIVE — SIGNIFICANT CHANGE UP
WBC # BLD: 8.88 K/UL — SIGNIFICANT CHANGE UP (ref 3.8–10.5)
WBC # FLD AUTO: 8.88 K/UL — SIGNIFICANT CHANGE UP (ref 3.8–10.5)

## 2020-01-07 PROCEDURE — G0463: CPT

## 2020-01-07 PROCEDURE — 36415 COLL VENOUS BLD VENIPUNCTURE: CPT

## 2020-01-07 PROCEDURE — 85027 COMPLETE CBC AUTOMATED: CPT

## 2020-01-07 PROCEDURE — 81001 URINALYSIS AUTO W/SCOPE: CPT

## 2020-01-07 PROCEDURE — 87086 URINE CULTURE/COLONY COUNT: CPT

## 2020-01-07 PROCEDURE — 93010 ELECTROCARDIOGRAM REPORT: CPT

## 2020-01-07 PROCEDURE — 80053 COMPREHEN METABOLIC PANEL: CPT

## 2020-01-07 PROCEDURE — 93005 ELECTROCARDIOGRAM TRACING: CPT

## 2020-01-07 RX ORDER — GANCICLOVIR 4.5 MG
1 IMPLANT (EA) INTRAOCULAR
Qty: 0 | Refills: 0 | DISCHARGE

## 2020-01-07 RX ORDER — DOCUSATE SODIUM 100 MG
1 CAPSULE ORAL
Qty: 0 | Refills: 0 | DISCHARGE

## 2020-01-07 NOTE — H&P PST ADULT - ASSESSMENT
73 y/o male 73 y/o male, PMH of myasthenia gravis, TIA, H/o Afib (resolved), CHF, acid reflux, with c/o of left groin pain and bulging. Seen by Dr Elise, diagnosed with right  inguinal hernia, scheduled for right inguinal hernia repair. Pre op testing today.   Labs - CBC, BMP, EKG done  Medical/ Cardiac eval advised  Preop 3 day CHG  instruction reviewed and given, , Verbalized understanding.  Avoid NSAID and OTC supplements for 7 days  NPO after 11pm the day before surgery. Take medicines as advised the DOS with sips of water.  Verbalized understanding, wife present during interview and instructions.  Is seeing neurologist every 3-4 months , scheduled for visit on 1/14/20, will try to obtain notes. 75 y/o male, PMH of myasthenia gravis, TIA, H/o Afib (resolved), CHF, acid reflux, with c/o of right groin pain and bulging. Seen by Dr Elise, diagnosed with right  inguinal hernia, scheduled for right inguinal hernia repair. Pre op testing today.   Labs - CBC, BMP, EKG done  Medical/ Cardiac eval advised  Preop 3 day CHG  instruction reviewed and given, , Verbalized understanding.  Avoid NSAID and OTC supplements for 7 days  NPO after 11pm the day before surgery. Take medicines as advised the DOS with sips of water.  Verbalized understanding, wife present during interview and instructions.  Is seeing neurologist every 3-4 months , scheduled for visit on 1/14/20, will try to obtain notes.   Addendum  Was told by neuro office that pt was seen by neurologist in Oct 2019, notes attached.

## 2020-01-07 NOTE — H&P PST ADULT - NSICDXPROBLEM_GEN_ALL_CORE_FT
PROBLEM DIAGNOSES  Problem: Inguinal hernia, right  Assessment and Plan: Repair of right inguinal hernia

## 2020-01-07 NOTE — H&P PST ADULT - NSICDXPASTSURGICALHX_GEN_ALL_CORE_FT
PAST SURGICAL HISTORY:  H/O coronary angiogram     H/O nasal polypectomy     Other cataract right PAST SURGICAL HISTORY:  Central venous catheter in place, temporary vortex portimplat on right chest    H/O cardiac radiofrequency ablation May 2018    H/O coronary angiogram 3/2012 no stents    H/O nasal polypectomy wears nasastrip for better breathing    Other cataract right    Status post placement of implantable loop recorder 4/2018 PAST SURGICAL HISTORY:  Central venous catheter in place, temporary vortex port implant on right chest    H/O cardiac radiofrequency ablation May 2018    H/O coronary angiogram 3/2012 no stents    H/O nasal polypectomy wears nasal strip for better breathing    Other cataract right    Status post placement of implantable loop recorder 4/2018 left chest

## 2020-01-07 NOTE — H&P PST ADULT - HISTORY OF PRESENT ILLNESS
75 y/o male, [facility] of 75 y/o male, PMH of myasthenia gravis, TIA, H/o Afib (resolved), CHF, acid reflux, with c/o of left groin pain and bulging. Seen by Dr Elise, diagnosed with right  inguinal hernia, scheduled for right inguinal hernia repair. Pre op testing today. 75 y/o male, PMH of myasthenia gravis, TIA, H/o Afib (resolved), CHF, acid reflux, with c/o of right groin pain and bulging. Seen by Dr Elise, diagnosed with right  inguinal hernia, scheduled for right inguinal hernia repair. Pre op testing today.

## 2020-01-07 NOTE — H&P PST ADULT - NSICDXFAMILYHX_GEN_ALL_CORE_FT
FAMILY HISTORY:  Sibling  Still living? Unknown  Family history of anemia, Age at diagnosis: Age Unknown

## 2020-01-07 NOTE — H&P PST ADULT - NSICDXPASTMEDICALHX_GEN_ALL_CORE_FT
PAST MEDICAL HISTORY:  Atrial fibrillation, unspecified type     Benign prostatic hyperplasia, presence of lower urinary tract symptoms unspecified, unspecified morphology     GERD (gastroesophageal reflux disease)     Glaucoma     Herpes virus disease right eye    History of myasthenia gravis 2016 , no weaknessin remission    HLD (hyperlipidemia)     Iron deficiency anemia     Myasthenia gravis     Nasal polyp     Osteopenia     Sleep apnea wear nasal strips    TIA (transient ischemic attack) 2006    Urinary retention PAST MEDICAL HISTORY:  Atrial fibrillation, unspecified type     Benign prostatic hyperplasia, presence of lower urinary tract symptoms unspecified, unspecified morphology     GERD (gastroesophageal reflux disease)     Glaucoma     Herpes virus disease right eye    History of myasthenia gravis 2016 , no weaknessin remission    HLD (hyperlipidemia)     Iron deficiency anemia     Myasthenia gravis     Nasal polyp     Osteopenia     Right hip pain avasular necrosis of right femoral head 2018    TIA (transient ischemic attack) 2006 PAST MEDICAL HISTORY:  Atrial fibrillation, unspecified type     Benign prostatic hyperplasia, presence of lower urinary tract symptoms unspecified, unspecified morphology     GERD (gastroesophageal reflux disease)     Glaucoma     Herpes virus disease right eye    History of myasthenia gravis 2016 , no weakness, in remission    HLD (hyperlipidemia)     Iron deficiency anemia     Nasal polyp     Osteopenia     Right hip pain avasular necrosis of right femoral head 2018    TIA (transient ischemic attack) 2006

## 2020-01-07 NOTE — H&P PST ADULT - NEUROLOGICAL COMMENTS
h/o myasthenia gretta, in remission, on plasmaphoresis every 3-4 months h/o myasthenia gretta, in remission, on plasma phoresis every 3-4 months h/o myasthenia gravis, in remission, on plasma phoresis every 3-4 months

## 2020-01-07 NOTE — H&P PST ADULT - NSANTHOSAYNRD_GEN_A_CORE
No. MAUDE screening performed.  STOP BANG Legend: 0-2 = LOW Risk; 3-4 = INTERMEDIATE Risk; 5-8 = HIGH Risk/wife says no, only snoring

## 2020-01-08 LAB
CULTURE RESULTS: NO GROWTH — SIGNIFICANT CHANGE UP
SPECIMEN SOURCE: SIGNIFICANT CHANGE UP

## 2020-01-13 ENCOUNTER — OUTPATIENT (OUTPATIENT)
Dept: OUTPATIENT SERVICES | Facility: HOSPITAL | Age: 75
LOS: 1 days | End: 2020-01-13
Payer: MEDICARE

## 2020-01-13 DIAGNOSIS — Z95.818 PRESENCE OF OTHER CARDIAC IMPLANTS AND GRAFTS: Chronic | ICD-10-CM

## 2020-01-13 DIAGNOSIS — Z98.890 OTHER SPECIFIED POSTPROCEDURAL STATES: Chronic | ICD-10-CM

## 2020-01-13 DIAGNOSIS — H26.8 OTHER SPECIFIED CATARACT: Chronic | ICD-10-CM

## 2020-01-13 DIAGNOSIS — G70.00 MYASTHENIA GRAVIS WITHOUT (ACUTE) EXACERBATION: ICD-10-CM

## 2020-01-13 DIAGNOSIS — Z95.828 PRESENCE OF OTHER VASCULAR IMPLANTS AND GRAFTS: Chronic | ICD-10-CM

## 2020-01-13 PROBLEM — M25.551 PAIN IN RIGHT HIP: Chronic | Status: ACTIVE | Noted: 2020-01-07

## 2020-01-13 LAB
CA-I BLD-SCNC: 1.22 MMOL/L — SIGNIFICANT CHANGE UP (ref 1.12–1.3)
HCT VFR BLD CALC: 41.4 % — SIGNIFICANT CHANGE UP (ref 39–50)
HGB BLD-MCNC: 13.4 G/DL — SIGNIFICANT CHANGE UP (ref 13–17)
MCHC RBC-ENTMCNC: 29.8 PG — SIGNIFICANT CHANGE UP (ref 27–34)
MCHC RBC-ENTMCNC: 32.4 GM/DL — SIGNIFICANT CHANGE UP (ref 32–36)
MCV RBC AUTO: 92 FL — SIGNIFICANT CHANGE UP (ref 80–100)
NRBC # BLD: 0 /100 WBCS — SIGNIFICANT CHANGE UP (ref 0–0)
PLATELET # BLD AUTO: 297 K/UL — SIGNIFICANT CHANGE UP (ref 150–400)
RBC # BLD: 4.5 M/UL — SIGNIFICANT CHANGE UP (ref 4.2–5.8)
RBC # FLD: 12.4 % — SIGNIFICANT CHANGE UP (ref 10.3–14.5)
WBC # BLD: 6.51 K/UL — SIGNIFICANT CHANGE UP (ref 3.8–10.5)
WBC # FLD AUTO: 6.51 K/UL — SIGNIFICANT CHANGE UP (ref 3.8–10.5)

## 2020-01-13 PROCEDURE — 36514 APHERESIS PLASMA: CPT

## 2020-01-13 PROCEDURE — 85027 COMPLETE CBC AUTOMATED: CPT

## 2020-01-13 PROCEDURE — 82330 ASSAY OF CALCIUM: CPT

## 2020-01-13 PROCEDURE — P9041: CPT

## 2020-01-15 ENCOUNTER — OUTPATIENT (OUTPATIENT)
Dept: OUTPATIENT SERVICES | Facility: HOSPITAL | Age: 75
LOS: 1 days | End: 2020-01-15
Payer: MEDICARE

## 2020-01-15 DIAGNOSIS — H26.8 OTHER SPECIFIED CATARACT: Chronic | ICD-10-CM

## 2020-01-15 DIAGNOSIS — Z98.890 OTHER SPECIFIED POSTPROCEDURAL STATES: Chronic | ICD-10-CM

## 2020-01-15 DIAGNOSIS — G70.00 MYASTHENIA GRAVIS WITHOUT (ACUTE) EXACERBATION: ICD-10-CM

## 2020-01-15 DIAGNOSIS — Z95.818 PRESENCE OF OTHER CARDIAC IMPLANTS AND GRAFTS: Chronic | ICD-10-CM

## 2020-01-15 DIAGNOSIS — Z95.828 PRESENCE OF OTHER VASCULAR IMPLANTS AND GRAFTS: Chronic | ICD-10-CM

## 2020-01-15 LAB
CA-I BLD-SCNC: 1.23 MMOL/L — SIGNIFICANT CHANGE UP (ref 1.12–1.3)
HCT VFR BLD CALC: 40.7 % — SIGNIFICANT CHANGE UP (ref 39–50)
HGB BLD-MCNC: 13.5 G/DL — SIGNIFICANT CHANGE UP (ref 13–17)
MCHC RBC-ENTMCNC: 30.5 PG — SIGNIFICANT CHANGE UP (ref 27–34)
MCHC RBC-ENTMCNC: 33.2 GM/DL — SIGNIFICANT CHANGE UP (ref 32–36)
MCV RBC AUTO: 91.9 FL — SIGNIFICANT CHANGE UP (ref 80–100)
NRBC # BLD: 0 /100 WBCS — SIGNIFICANT CHANGE UP (ref 0–0)
PLATELET # BLD AUTO: 235 K/UL — SIGNIFICANT CHANGE UP (ref 150–400)
RBC # BLD: 4.43 M/UL — SIGNIFICANT CHANGE UP (ref 4.2–5.8)
RBC # FLD: 12.5 % — SIGNIFICANT CHANGE UP (ref 10.3–14.5)
WBC # BLD: 7.3 K/UL — SIGNIFICANT CHANGE UP (ref 3.8–10.5)
WBC # FLD AUTO: 7.3 K/UL — SIGNIFICANT CHANGE UP (ref 3.8–10.5)

## 2020-01-15 PROCEDURE — P9041: CPT

## 2020-01-15 PROCEDURE — 36514 APHERESIS PLASMA: CPT

## 2020-01-15 PROCEDURE — 82330 ASSAY OF CALCIUM: CPT

## 2020-01-15 PROCEDURE — 85027 COMPLETE CBC AUTOMATED: CPT

## 2020-01-15 PROCEDURE — P9045: CPT

## 2020-01-17 ENCOUNTER — OUTPATIENT (OUTPATIENT)
Dept: OUTPATIENT SERVICES | Facility: HOSPITAL | Age: 75
LOS: 1 days | End: 2020-01-17
Payer: MEDICARE

## 2020-01-17 DIAGNOSIS — Z95.818 PRESENCE OF OTHER CARDIAC IMPLANTS AND GRAFTS: Chronic | ICD-10-CM

## 2020-01-17 DIAGNOSIS — H26.8 OTHER SPECIFIED CATARACT: Chronic | ICD-10-CM

## 2020-01-17 DIAGNOSIS — Z95.828 PRESENCE OF OTHER VASCULAR IMPLANTS AND GRAFTS: Chronic | ICD-10-CM

## 2020-01-17 DIAGNOSIS — Z98.890 OTHER SPECIFIED POSTPROCEDURAL STATES: Chronic | ICD-10-CM

## 2020-01-17 DIAGNOSIS — G70.00 MYASTHENIA GRAVIS WITHOUT (ACUTE) EXACERBATION: ICD-10-CM

## 2020-01-17 LAB
CA-I BLD-SCNC: 1.23 MMOL/L — SIGNIFICANT CHANGE UP (ref 1.12–1.3)
HCT VFR BLD CALC: 41.3 % — SIGNIFICANT CHANGE UP (ref 39–50)
HGB BLD-MCNC: 13.4 G/DL — SIGNIFICANT CHANGE UP (ref 13–17)
MCHC RBC-ENTMCNC: 29.8 PG — SIGNIFICANT CHANGE UP (ref 27–34)
MCHC RBC-ENTMCNC: 32.4 GM/DL — SIGNIFICANT CHANGE UP (ref 32–36)
MCV RBC AUTO: 91.8 FL — SIGNIFICANT CHANGE UP (ref 80–100)
NRBC # BLD: 0 /100 WBCS — SIGNIFICANT CHANGE UP (ref 0–0)
PLATELET # BLD AUTO: 232 K/UL — SIGNIFICANT CHANGE UP (ref 150–400)
RBC # BLD: 4.5 M/UL — SIGNIFICANT CHANGE UP (ref 4.2–5.8)
RBC # FLD: 12.8 % — SIGNIFICANT CHANGE UP (ref 10.3–14.5)
WBC # BLD: 8.41 K/UL — SIGNIFICANT CHANGE UP (ref 3.8–10.5)
WBC # FLD AUTO: 8.41 K/UL — SIGNIFICANT CHANGE UP (ref 3.8–10.5)

## 2020-01-17 PROCEDURE — 36514 APHERESIS PLASMA: CPT

## 2020-01-17 PROCEDURE — P9041: CPT

## 2020-01-17 PROCEDURE — P9045: CPT

## 2020-01-17 PROCEDURE — 85027 COMPLETE CBC AUTOMATED: CPT

## 2020-01-17 PROCEDURE — 82330 ASSAY OF CALCIUM: CPT

## 2020-01-23 ENCOUNTER — TRANSCRIPTION ENCOUNTER (OUTPATIENT)
Age: 75
End: 2020-01-23

## 2020-01-23 RX ORDER — ACETAMINOPHEN 500 MG
975 TABLET ORAL ONCE
Refills: 0 | Status: DISCONTINUED | OUTPATIENT
Start: 2020-01-24 | End: 2020-02-11

## 2020-01-23 NOTE — ASU PATIENT PROFILE, ADULT - PSH
Central venous catheter in place, temporary  vortex port implant on right chest  H/O cardiac radiofrequency ablation  May 2018  H/O coronary angiogram  3/2012 no stents  H/O nasal polypectomy  wears nasal strip for better breathing  Other cataract  right  Status post placement of implantable loop recorder  4/2018 left chest

## 2020-01-23 NOTE — ASU PATIENT PROFILE, ADULT - PMH
Atrial fibrillation, unspecified type    Benign prostatic hyperplasia, presence of lower urinary tract symptoms unspecified, unspecified morphology    GERD (gastroesophageal reflux disease)    Glaucoma    Herpes virus disease  right eye  History of myasthenia gravis  2016 , no weakness, in remission  HLD (hyperlipidemia)    Iron deficiency anemia    Nasal polyp    Osteopenia    Right hip pain  avasular necrosis of right femoral head 2018  TIA (transient ischemic attack)  2006

## 2020-01-24 ENCOUNTER — OUTPATIENT (OUTPATIENT)
Dept: OUTPATIENT SERVICES | Facility: HOSPITAL | Age: 75
LOS: 1 days | End: 2020-01-24
Payer: MEDICARE

## 2020-01-24 VITALS
DIASTOLIC BLOOD PRESSURE: 76 MMHG | OXYGEN SATURATION: 98 % | RESPIRATION RATE: 14 BRPM | HEART RATE: 78 BPM | SYSTOLIC BLOOD PRESSURE: 125 MMHG

## 2020-01-24 VITALS
SYSTOLIC BLOOD PRESSURE: 127 MMHG | WEIGHT: 168.43 LBS | RESPIRATION RATE: 16 BRPM | OXYGEN SATURATION: 99 % | DIASTOLIC BLOOD PRESSURE: 69 MMHG | HEART RATE: 76 BPM | HEIGHT: 67 IN | TEMPERATURE: 98 F

## 2020-01-24 DIAGNOSIS — Z95.818 PRESENCE OF OTHER CARDIAC IMPLANTS AND GRAFTS: Chronic | ICD-10-CM

## 2020-01-24 DIAGNOSIS — K40.90 UNILATERAL INGUINAL HERNIA, WITHOUT OBSTRUCTION OR GANGRENE, NOT SPECIFIED AS RECURRENT: ICD-10-CM

## 2020-01-24 DIAGNOSIS — Z98.890 OTHER SPECIFIED POSTPROCEDURAL STATES: Chronic | ICD-10-CM

## 2020-01-24 DIAGNOSIS — Z95.828 PRESENCE OF OTHER VASCULAR IMPLANTS AND GRAFTS: Chronic | ICD-10-CM

## 2020-01-24 DIAGNOSIS — H26.8 OTHER SPECIFIED CATARACT: Chronic | ICD-10-CM

## 2020-01-24 PROCEDURE — C1781: CPT

## 2020-01-24 PROCEDURE — 49507 PRP I/HERN INIT BLOCK >5 YR: CPT | Mod: RT

## 2020-01-24 RX ORDER — IBUPROFEN 200 MG
1 TABLET ORAL
Qty: 20 | Refills: 0
Start: 2020-01-24

## 2020-01-24 RX ORDER — OXYCODONE HYDROCHLORIDE 5 MG/1
5 TABLET ORAL ONCE
Refills: 0 | Status: DISCONTINUED | OUTPATIENT
Start: 2020-01-24 | End: 2020-01-24

## 2020-01-24 RX ORDER — SODIUM CHLORIDE 9 MG/ML
1000 INJECTION, SOLUTION INTRAVENOUS
Refills: 0 | Status: DISCONTINUED | OUTPATIENT
Start: 2020-01-24 | End: 2020-01-24

## 2020-01-24 RX ORDER — DOCUSATE SODIUM 100 MG
1 CAPSULE ORAL
Qty: 20 | Refills: 0
Start: 2020-01-24

## 2020-01-24 RX ORDER — ONDANSETRON 8 MG/1
4 TABLET, FILM COATED ORAL ONCE
Refills: 0 | Status: DISCONTINUED | OUTPATIENT
Start: 2020-01-24 | End: 2020-01-24

## 2020-01-24 RX ORDER — ASPIRIN/CALCIUM CARB/MAGNESIUM 324 MG
1 TABLET ORAL
Qty: 0 | Refills: 0 | DISCHARGE

## 2020-01-24 RX ORDER — HYDROMORPHONE HYDROCHLORIDE 2 MG/ML
0.5 INJECTION INTRAMUSCULAR; INTRAVENOUS; SUBCUTANEOUS
Refills: 0 | Status: DISCONTINUED | OUTPATIENT
Start: 2020-01-24 | End: 2020-01-24

## 2020-01-24 RX ORDER — BUPIVACAINE HCL/PF 7.5 MG/ML
270 VIAL (ML) INJECTION
Refills: 0 | Status: DISCONTINUED | OUTPATIENT
Start: 2020-01-24 | End: 2020-01-24

## 2020-01-24 RX ADMIN — SODIUM CHLORIDE 75 MILLILITER(S): 9 INJECTION, SOLUTION INTRAVENOUS at 06:59

## 2020-02-18 ENCOUNTER — OUTPATIENT (OUTPATIENT)
Dept: OUTPATIENT SERVICES | Facility: HOSPITAL | Age: 75
LOS: 1 days | End: 2020-02-18
Payer: MEDICARE

## 2020-02-18 DIAGNOSIS — Z98.890 OTHER SPECIFIED POSTPROCEDURAL STATES: Chronic | ICD-10-CM

## 2020-02-18 DIAGNOSIS — H26.8 OTHER SPECIFIED CATARACT: Chronic | ICD-10-CM

## 2020-02-18 DIAGNOSIS — Z95.828 PRESENCE OF OTHER VASCULAR IMPLANTS AND GRAFTS: Chronic | ICD-10-CM

## 2020-02-18 DIAGNOSIS — Z95.818 PRESENCE OF OTHER CARDIAC IMPLANTS AND GRAFTS: Chronic | ICD-10-CM

## 2020-02-18 DIAGNOSIS — G70.00 MYASTHENIA GRAVIS WITHOUT (ACUTE) EXACERBATION: ICD-10-CM

## 2020-02-18 PROBLEM — Z86.69 PERSONAL HISTORY OF OTHER DISEASES OF THE NERVOUS SYSTEM AND SENSE ORGANS: Chronic | Status: ACTIVE | Noted: 2020-01-07

## 2020-02-18 PROCEDURE — 96523 IRRIG DRUG DELIVERY DEVICE: CPT

## 2020-03-16 ENCOUNTER — OUTPATIENT (OUTPATIENT)
Dept: OUTPATIENT SERVICES | Facility: HOSPITAL | Age: 75
LOS: 1 days | End: 2020-03-16
Payer: MEDICARE

## 2020-03-16 DIAGNOSIS — Z98.890 OTHER SPECIFIED POSTPROCEDURAL STATES: Chronic | ICD-10-CM

## 2020-03-16 DIAGNOSIS — Z95.818 PRESENCE OF OTHER CARDIAC IMPLANTS AND GRAFTS: Chronic | ICD-10-CM

## 2020-03-16 DIAGNOSIS — Z95.828 PRESENCE OF OTHER VASCULAR IMPLANTS AND GRAFTS: Chronic | ICD-10-CM

## 2020-03-16 DIAGNOSIS — G70.00 MYASTHENIA GRAVIS WITHOUT (ACUTE) EXACERBATION: ICD-10-CM

## 2020-03-16 DIAGNOSIS — H26.8 OTHER SPECIFIED CATARACT: Chronic | ICD-10-CM

## 2020-03-16 LAB
HCT VFR BLD CALC: 39.7 % — SIGNIFICANT CHANGE UP (ref 39–50)
HGB BLD-MCNC: 13 G/DL — SIGNIFICANT CHANGE UP (ref 13–17)
MCHC RBC-ENTMCNC: 29.5 PG — SIGNIFICANT CHANGE UP (ref 27–34)
MCHC RBC-ENTMCNC: 32.7 GM/DL — SIGNIFICANT CHANGE UP (ref 32–36)
MCV RBC AUTO: 90.2 FL — SIGNIFICANT CHANGE UP (ref 80–100)
NRBC # BLD: 0 /100 WBCS — SIGNIFICANT CHANGE UP (ref 0–0)
PLATELET # BLD AUTO: 358 K/UL — SIGNIFICANT CHANGE UP (ref 150–400)
RBC # BLD: 4.4 M/UL — SIGNIFICANT CHANGE UP (ref 4.2–5.8)
RBC # FLD: 12.8 % — SIGNIFICANT CHANGE UP (ref 10.3–14.5)
WBC # BLD: 11.1 K/UL — HIGH (ref 3.8–10.5)
WBC # FLD AUTO: 11.1 K/UL — HIGH (ref 3.8–10.5)

## 2020-03-16 PROCEDURE — 36514 APHERESIS PLASMA: CPT

## 2020-03-16 PROCEDURE — P9045: CPT

## 2020-03-16 PROCEDURE — 85027 COMPLETE CBC AUTOMATED: CPT

## 2020-03-16 PROCEDURE — P9041: CPT

## 2020-04-17 ENCOUNTER — OUTPATIENT (OUTPATIENT)
Dept: OUTPATIENT SERVICES | Facility: HOSPITAL | Age: 75
LOS: 1 days | End: 2020-04-17
Payer: MEDICARE

## 2020-04-17 DIAGNOSIS — H26.8 OTHER SPECIFIED CATARACT: Chronic | ICD-10-CM

## 2020-04-17 DIAGNOSIS — Z95.818 PRESENCE OF OTHER CARDIAC IMPLANTS AND GRAFTS: Chronic | ICD-10-CM

## 2020-04-17 DIAGNOSIS — Z98.890 OTHER SPECIFIED POSTPROCEDURAL STATES: Chronic | ICD-10-CM

## 2020-04-17 DIAGNOSIS — G70.00 MYASTHENIA GRAVIS WITHOUT (ACUTE) EXACERBATION: ICD-10-CM

## 2020-04-17 DIAGNOSIS — Z95.828 PRESENCE OF OTHER VASCULAR IMPLANTS AND GRAFTS: Chronic | ICD-10-CM

## 2020-04-17 LAB
ALBUMIN SERPL ELPH-MCNC: 3.9 G/DL — SIGNIFICANT CHANGE UP (ref 3.3–5)
ALP SERPL-CCNC: 88 U/L — SIGNIFICANT CHANGE UP (ref 40–120)
ALT FLD-CCNC: 10 U/L — SIGNIFICANT CHANGE UP (ref 10–45)
ANION GAP SERPL CALC-SCNC: 13 MMOL/L — SIGNIFICANT CHANGE UP (ref 5–17)
AST SERPL-CCNC: 18 U/L — SIGNIFICANT CHANGE UP (ref 10–40)
BILIRUB SERPL-MCNC: 0.2 MG/DL — SIGNIFICANT CHANGE UP (ref 0.2–1.2)
BUN SERPL-MCNC: 15 MG/DL — SIGNIFICANT CHANGE UP (ref 7–23)
CALCIUM SERPL-MCNC: 9.1 MG/DL — SIGNIFICANT CHANGE UP (ref 8.4–10.5)
CHLORIDE SERPL-SCNC: 106 MMOL/L — SIGNIFICANT CHANGE UP (ref 96–108)
CO2 SERPL-SCNC: 23 MMOL/L — SIGNIFICANT CHANGE UP (ref 22–31)
CREAT SERPL-MCNC: 0.67 MG/DL — SIGNIFICANT CHANGE UP (ref 0.5–1.3)
GLUCOSE SERPL-MCNC: 126 MG/DL — HIGH (ref 70–99)
HCT VFR BLD CALC: 40.6 % — SIGNIFICANT CHANGE UP (ref 39–50)
HGB BLD-MCNC: 13.2 G/DL — SIGNIFICANT CHANGE UP (ref 13–17)
MCHC RBC-ENTMCNC: 29.8 PG — SIGNIFICANT CHANGE UP (ref 27–34)
MCHC RBC-ENTMCNC: 32.5 GM/DL — SIGNIFICANT CHANGE UP (ref 32–36)
MCV RBC AUTO: 91.6 FL — SIGNIFICANT CHANGE UP (ref 80–100)
NRBC # BLD: 0 /100 WBCS — SIGNIFICANT CHANGE UP (ref 0–0)
PLATELET # BLD AUTO: 314 K/UL — SIGNIFICANT CHANGE UP (ref 150–400)
POTASSIUM SERPL-MCNC: 3.7 MMOL/L — SIGNIFICANT CHANGE UP (ref 3.5–5.3)
POTASSIUM SERPL-SCNC: 3.7 MMOL/L — SIGNIFICANT CHANGE UP (ref 3.5–5.3)
PROT SERPL-MCNC: 6.3 G/DL — SIGNIFICANT CHANGE UP (ref 6–8.3)
RBC # BLD: 4.43 M/UL — SIGNIFICANT CHANGE UP (ref 4.2–5.8)
RBC # FLD: 13.6 % — SIGNIFICANT CHANGE UP (ref 10.3–14.5)
SODIUM SERPL-SCNC: 142 MMOL/L — SIGNIFICANT CHANGE UP (ref 135–145)
WBC # BLD: 8.36 K/UL — SIGNIFICANT CHANGE UP (ref 3.8–10.5)
WBC # FLD AUTO: 8.36 K/UL — SIGNIFICANT CHANGE UP (ref 3.8–10.5)

## 2020-04-17 PROCEDURE — 96523 IRRIG DRUG DELIVERY DEVICE: CPT

## 2020-04-17 PROCEDURE — 85027 COMPLETE CBC AUTOMATED: CPT

## 2020-04-17 PROCEDURE — 80053 COMPREHEN METABOLIC PANEL: CPT

## 2020-05-22 ENCOUNTER — OUTPATIENT (OUTPATIENT)
Dept: OUTPATIENT SERVICES | Facility: HOSPITAL | Age: 75
LOS: 1 days | End: 2020-05-22
Payer: MEDICARE

## 2020-05-22 DIAGNOSIS — G70.00 MYASTHENIA GRAVIS WITHOUT (ACUTE) EXACERBATION: ICD-10-CM

## 2020-05-22 DIAGNOSIS — Z95.828 PRESENCE OF OTHER VASCULAR IMPLANTS AND GRAFTS: Chronic | ICD-10-CM

## 2020-05-22 DIAGNOSIS — Z98.890 OTHER SPECIFIED POSTPROCEDURAL STATES: Chronic | ICD-10-CM

## 2020-05-22 DIAGNOSIS — Z95.818 PRESENCE OF OTHER CARDIAC IMPLANTS AND GRAFTS: Chronic | ICD-10-CM

## 2020-05-22 DIAGNOSIS — H26.8 OTHER SPECIFIED CATARACT: Chronic | ICD-10-CM

## 2020-05-22 PROCEDURE — 96523 IRRIG DRUG DELIVERY DEVICE: CPT

## 2020-06-11 NOTE — H&P PST ADULT - WEIGHT IN KG
What Type Of Note Output Would You Prefer (Optional)?: Standard Output Hpi Title: Evaluation of Skin Lesions How Severe Are Your Spot(S)?: moderate Have Your Spot(S) Been Treated In The Past?: has not been treated 76.4

## 2020-08-06 NOTE — ED ADULT TRIAGE NOTE - CHIEF COMPLAINT QUOTE
Pt reports fever muscle aches Primary Defect Width In Cm (Final Defect Size - Required For Flaps/Grafts): 1.4

## 2020-08-10 ENCOUNTER — RECORD ABSTRACTING (OUTPATIENT)
Age: 75
End: 2020-08-10

## 2020-08-10 DIAGNOSIS — R60.0 LOCALIZED EDEMA: ICD-10-CM

## 2020-08-10 DIAGNOSIS — Z87.19 PERSONAL HISTORY OF OTHER DISEASES OF THE DIGESTIVE SYSTEM: ICD-10-CM

## 2020-08-10 DIAGNOSIS — Z86.79 PERSONAL HISTORY OF OTHER DISEASES OF THE CIRCULATORY SYSTEM: ICD-10-CM

## 2020-08-10 DIAGNOSIS — Z87.898 PERSONAL HISTORY OF OTHER SPECIFIED CONDITIONS: ICD-10-CM

## 2020-08-10 DIAGNOSIS — Z86.73 PERSONAL HISTORY OF TRANSIENT ISCHEMIC ATTACK (TIA), AND CEREBRAL INFARCTION W/OUT RESIDUAL DEFICITS: ICD-10-CM

## 2020-08-10 DIAGNOSIS — I48.0 PAROXYSMAL ATRIAL FIBRILLATION: ICD-10-CM

## 2020-08-10 DIAGNOSIS — I97.3 POSTPROCEDURAL HYPERTENSION: ICD-10-CM

## 2020-08-10 DIAGNOSIS — I50.9 HEART FAILURE, UNSPECIFIED: ICD-10-CM

## 2020-08-10 RX ORDER — ACETAMINOPHEN 160 MG/5ML
SUSPENSION, ORAL (FINAL DOSE FORM) ORAL
Refills: 0 | Status: ACTIVE | COMMUNITY

## 2020-08-10 RX ORDER — MIRTAZAPINE 15 MG/1
15 TABLET, FILM COATED ORAL
Refills: 0 | Status: ACTIVE | COMMUNITY

## 2020-08-10 RX ORDER — CALCIUM CARBONATE/VITAMIN D3 600 MG-10
TABLET ORAL
Refills: 0 | Status: ACTIVE | COMMUNITY

## 2020-08-10 RX ORDER — PREDNISOLONE ACETATE 10 MG/ML
1 SUSPENSION/ DROPS OPHTHALMIC
Refills: 0 | Status: ACTIVE | COMMUNITY

## 2020-08-10 RX ORDER — CHROMIUM 200 MCG
500 TABLET ORAL
Refills: 0 | Status: ACTIVE | COMMUNITY

## 2020-08-10 RX ORDER — TAMSULOSIN HYDROCHLORIDE 0.4 MG/1
0.4 CAPSULE ORAL
Refills: 0 | Status: ACTIVE | COMMUNITY

## 2020-08-10 RX ORDER — FERROUS SULFATE 325(65) MG
325 TABLET ORAL
Refills: 0 | Status: ACTIVE | COMMUNITY

## 2020-08-13 ENCOUNTER — APPOINTMENT (OUTPATIENT)
Dept: SURGERY | Facility: CLINIC | Age: 75
End: 2020-08-13
Payer: MEDICARE

## 2020-08-13 VITALS — DIASTOLIC BLOOD PRESSURE: 89 MMHG | SYSTOLIC BLOOD PRESSURE: 165 MMHG

## 2020-08-13 VITALS — TEMPERATURE: 98.5 F

## 2020-08-13 PROCEDURE — 99212 OFFICE O/P EST SF 10 MIN: CPT

## 2020-08-13 NOTE — PHYSICAL EXAM
[Normal Heart Sounds] : normal heart sounds [Normal Breath Sounds] : Normal breath sounds [Abdominal Masses] : No abdominal masses [Abdomen Tenderness] : ~T ~M No abdominal tenderness [Enlarged] : not enlarged [Tender] : was nontender [Alert] : alert [No Rash or Lesion] : No rash or lesion [Oriented to Place] : oriented to place [Oriented to Person] : oriented to person [Oriented to Time] : oriented to time [Calm] : calm [de-identified] : benign [de-identified] : non injected and non icteric [de-identified] : without adenopathy [de-identified] : well developed white female in no acute distress [de-identified] : normal testicles, repairs intact

## 2020-08-13 NOTE — HISTORY OF PRESENT ILLNESS
[de-identified] : pt is s/p a open right inguinal hernia repair 1/20.  He was doing well until approximately one month ago when he had his first episode of right groin pain- his pain was localized, without radiation.  It increased with activity.  He denies alleviating factors.  He has no GI symptoms.   [de-identified] : right groin pain

## 2020-08-13 NOTE — REVIEW OF SYSTEMS
[Earache] : no earache [Nosebleeds] : no nosebleeds [Loss Of Hearing] : hearing loss [Nasal Discharge] : no nasal discharge [Sore Throat] : no sore throat [Hoarseness] : no hoarseness [Abdominal Pain] : no abdominal pain [Vomiting] : no vomiting [Constipation] : no constipation [Heartburn] : heartburn [Diarrhea] : no diarrhea [Melena] : no melena [Dysuria] : no dysuria [Incontinence] : no incontinence [Hesitancy] : no urinary hesitancy [Nocturia] : nocturia [Testicular Pain] : no testicular pain [Genital Lesion] : no genital lesions [FreeTextEntry8] : right groin pain [Negative] : Heme/Lymph [FreeTextEntry9] : sjpinal stenosis [de-identified] : has myostenia gravis

## 2020-08-20 ENCOUNTER — RECORD ABSTRACTING (OUTPATIENT)
Age: 75
End: 2020-08-20

## 2020-08-20 DIAGNOSIS — H65.02 ACUTE SEROUS OTITIS MEDIA, LEFT EAR: ICD-10-CM

## 2020-08-20 DIAGNOSIS — H91.22 SUDDEN IDIOPATHIC HEARING LOSS, LEFT EAR: ICD-10-CM

## 2020-08-20 DIAGNOSIS — Z87.09 PERSONAL HISTORY OF OTHER DISEASES OF THE RESPIRATORY SYSTEM: ICD-10-CM

## 2020-08-20 DIAGNOSIS — Z95.818 PRESENCE OF OTHER CARDIAC IMPLANTS AND GRAFTS: ICD-10-CM

## 2020-08-20 DIAGNOSIS — J34.3 HYPERTROPHY OF NASAL TURBINATES: ICD-10-CM

## 2020-08-20 DIAGNOSIS — J34.89 OTHER SPECIFIED DISORDERS OF NOSE AND NASAL SINUSES: ICD-10-CM

## 2020-08-20 RX ORDER — MYCOPHENOLATE MOFETIL 500 MG/1
500 TABLET ORAL
Refills: 0 | Status: ACTIVE | COMMUNITY

## 2020-08-20 RX ORDER — PYRIDOSTIGMINE BROMIDE 60 MG/1
60 TABLET ORAL
Refills: 0 | Status: ACTIVE | COMMUNITY

## 2020-09-04 ENCOUNTER — APPOINTMENT (OUTPATIENT)
Dept: OTOLARYNGOLOGY | Facility: CLINIC | Age: 75
End: 2020-09-04
Payer: MEDICARE

## 2020-09-04 VITALS
BODY MASS INDEX: 26.68 KG/M2 | HEIGHT: 67 IN | TEMPERATURE: 97.7 F | SYSTOLIC BLOOD PRESSURE: 141 MMHG | DIASTOLIC BLOOD PRESSURE: 80 MMHG | WEIGHT: 170 LBS

## 2020-09-04 PROCEDURE — 92557 COMPREHENSIVE HEARING TEST: CPT

## 2020-09-04 PROCEDURE — 99213 OFFICE O/P EST LOW 20 MIN: CPT

## 2020-09-04 PROCEDURE — 92567 TYMPANOMETRY: CPT

## 2020-09-04 NOTE — HISTORY OF PRESENT ILLNESS
[No] : patient does not have a  history of radiation therapy [Hearing Loss] : hearing loss [de-identified] : Pt with hx of sudden decrease in hearing in left ear. 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 Left ear dropped. treated with oral steroids and then 3 transtympanic membrane injections with some improvement but without a return to normal no tinnitus

## 2020-09-04 NOTE — CONSULT LETTER
[Dear  ___] : Dear  [unfilled], [Please see my note below.] : Please see my note below. [Courtesy Letter:] : I had the pleasure of seeing your patient, [unfilled], in my office today. [Consult Closing:] : Thank you very much for allowing me to participate in the care of this patient.  If you have any questions, please do not hesitate to contact me. [Sincerely,] : Sincerely, [FreeTextEntry3] : Gennaro Hi MD FACS

## 2020-09-04 NOTE — REASON FOR VISIT
[Subsequent Evaluation] : a subsequent evaluation for [Ear Pain] : ear pain [Nasal Obstruction] : nasal obstruction [Hearing Loss] : hearing loss [Sleep Apnea/ Snoring] : sleep apnea/ snoring [FreeTextEntry2] : follow up

## 2020-09-04 NOTE — PHYSICAL EXAM
[] : septum deviated to the right [Normal] : mucosa is normal [Midline] : trachea located in midline position [de-identified] : right nl left with dry blood and debris on tm  sp injection. unable to remove. right looks nl [de-identified] : inflamed [de-identified] : right nl left not fully visible. tuning forks no lateralization

## 2020-09-04 NOTE — REVIEW OF SYSTEMS
[Nasal Congestion] : nasal congestion [Ear Pain] : ear pain [Ear Itch] : ear itch [Problem Snoring] : problem snoring [Snoring With Pauses] : snoring with pauses [Joint Pain] : joint pain [Negative] : Heme/Lymph [FreeTextEntry1] : skin rash

## 2020-09-04 NOTE — ASSESSMENT
[FreeTextEntry1] : Audio shows slight improvement.  . to use peroxide bid to clear left ear. hearing aids adjusted> If doesn't see debris come out needs to be seen in office in 2-3 weeks. If comes out 2-3 months

## 2020-09-14 ENCOUNTER — APPOINTMENT (OUTPATIENT)
Dept: SURGERY | Facility: CLINIC | Age: 75
End: 2020-09-14
Payer: MEDICARE

## 2020-09-14 VITALS — TEMPERATURE: 98.6 F

## 2020-09-14 DIAGNOSIS — S76.211D STRAIN OF ADDUCTOR MUSCLE, FASCIA AND TENDON OF RIGHT THIGH, SUBSEQUENT ENCOUNTER: ICD-10-CM

## 2020-09-14 PROCEDURE — 99212 OFFICE O/P EST SF 10 MIN: CPT

## 2020-09-14 NOTE — HISTORY OF PRESENT ILLNESS
[de-identified] : with right groin pain, s/p prior hernia repair [de-identified] : Pt denies any inguinal pain, without swelling and normal GI functioning.  He denies fevers, chills or night sweats.

## 2020-09-14 NOTE — PHYSICAL EXAM
[Normal Breath Sounds] : Normal breath sounds [Normal Heart Sounds] : normal heart sounds [Normal Rate and Rhythm] : normal rate and rhythm [Abdominal Masses] : No abdominal masses [Abdomen Tenderness] : ~T ~M No abdominal tenderness [de-identified] : benign [de-identified] : well developed male in no acute distress [de-identified] : without calf pain or swelling [de-identified] : normal testicle without palpable hernia

## 2020-11-03 ENCOUNTER — APPOINTMENT (OUTPATIENT)
Dept: PHARMACY | Facility: CLINIC | Age: 75
End: 2020-11-03
Payer: SELF-PAY

## 2020-11-03 PROCEDURE — V5299A: CUSTOM | Mod: NC

## 2020-12-04 ENCOUNTER — APPOINTMENT (OUTPATIENT)
Dept: OTOLARYNGOLOGY | Facility: CLINIC | Age: 75
End: 2020-12-04
Payer: MEDICARE

## 2020-12-04 VITALS
WEIGHT: 169 LBS | HEART RATE: 83 BPM | TEMPERATURE: 98 F | BODY MASS INDEX: 26.53 KG/M2 | HEIGHT: 67 IN | DIASTOLIC BLOOD PRESSURE: 80 MMHG | SYSTOLIC BLOOD PRESSURE: 161 MMHG

## 2020-12-04 PROCEDURE — 99213 OFFICE O/P EST LOW 20 MIN: CPT

## 2020-12-04 RX ORDER — POTASSIUM CHLORIDE 10 MEQ
10 CAPSULE, EXTENDED RELEASE ORAL DAILY
Refills: 0 | Status: DISCONTINUED | COMMUNITY
End: 2020-12-04

## 2020-12-04 RX ORDER — GABAPENTIN 100 MG/1
100 CAPSULE ORAL
Refills: 0 | Status: ACTIVE | COMMUNITY
Start: 2020-12-04

## 2020-12-04 RX ORDER — DIPHENHYDRAMINE HCL 50 MG/1
50 CAPSULE ORAL
Refills: 0 | Status: DISCONTINUED | COMMUNITY
End: 2020-12-04

## 2020-12-04 RX ORDER — TAMSULOSIN HYDROCHLORIDE 0.4 MG/1
0.4 CAPSULE ORAL
Refills: 0 | Status: DISCONTINUED | COMMUNITY
End: 2020-12-04

## 2020-12-04 RX ORDER — POTASSIUM ACETATE 2 MEQ/ML
2 VIAL (ML) INTRAVENOUS
Refills: 0 | Status: DISCONTINUED | COMMUNITY
End: 2020-12-04

## 2020-12-04 RX ORDER — FUROSEMIDE 40 MG/1
40 TABLET ORAL DAILY
Refills: 0 | Status: DISCONTINUED | COMMUNITY
End: 2020-12-04

## 2020-12-04 NOTE — ASSESSMENT
[FreeTextEntry1] : h/o myasthenia gravis, bilateral SNHL - wears bilateral amplification, watchman device, a-fib, loop recorder, sleep apnea and sudden left SNHL\par s/p Left Trans-Tympanic Steroid Membrane Injection on 3/30/2020, 4/16/2020 and 5/12/2020\par left cerumen plug\par \par Plan:\par Cerumen removed. No q-tips. Peroxide drops prn. FU 3 months.

## 2020-12-04 NOTE — PHYSICAL EXAM
[Midline] : trachea located in midline position [Normal] : no rashes [FreeTextEntry9] : cerumen plug removed via curettage  [FreeTextEntry1] : deviated septum [FreeTextEntry2] : sinuses nontender to percussion

## 2020-12-04 NOTE — HISTORY OF PRESENT ILLNESS
[de-identified] : The patient presents with h/o myasthenia gravis, bilateral SNHL - wears bilateral amplification, watchman device, a-fib, loop recorder, sleep apnea and sudden left SNHL. s/p Left Trans-Tympanic Steroid Membrane Injection on 3/30/2020, 4/16/2020 and 5/12/2020. Pt states that he occasionally gets something out of the ears bilaterally but no debris came out with the peroxide drops.

## 2020-12-04 NOTE — ADDENDUM
[FreeTextEntry1] : Documented by Rebekah Moser acting as scribe for Dr. Hi on 12/04/2020.\par \par All Medical record entries made by the Scribe were at my, Dr. Hi, direction and personally dictated by me on 12/04/2020 . I have reviewed the chart and agree that the record accurately reflects my personal performance of the history, physical exam, assessment and plan. I have also personally directed, reviewed, and agreed with the discharge instructions.

## 2020-12-04 NOTE — CONSULT LETTER
[Dear  ___] : Dear  [unfilled], [Courtesy Letter:] : I had the pleasure of seeing your patient, [unfilled], in my office today. [Please see my note below.] : Please see my note below. [Consult Closing:] : Thank you very much for allowing me to participate in the care of this patient.  If you have any questions, please do not hesitate to contact me. [Sincerely,] : Sincerely, [FreeTextEntry3] : Gennaro Hi MD FACS

## 2020-12-08 ENCOUNTER — APPOINTMENT (OUTPATIENT)
Dept: PHARMACY | Facility: CLINIC | Age: 75
End: 2020-12-08
Payer: SELF-PAY

## 2020-12-08 PROCEDURE — V5264E: CUSTOM

## 2020-12-08 PROCEDURE — V5299A: CUSTOM | Mod: NC

## 2020-12-22 ENCOUNTER — APPOINTMENT (OUTPATIENT)
Dept: PHARMACY | Facility: CLINIC | Age: 75
End: 2020-12-22
Payer: SELF-PAY

## 2020-12-22 PROCEDURE — V5299A: CUSTOM | Mod: NC

## 2020-12-30 ENCOUNTER — APPOINTMENT (OUTPATIENT)
Dept: PHARMACY | Facility: CLINIC | Age: 75
End: 2020-12-30
Payer: SELF-PAY

## 2020-12-30 PROCEDURE — V5299A: CUSTOM | Mod: NC

## 2021-01-13 ENCOUNTER — APPOINTMENT (OUTPATIENT)
Dept: PHARMACY | Facility: CLINIC | Age: 76
End: 2021-01-13
Payer: SELF-PAY

## 2021-01-13 PROCEDURE — V5299A: CUSTOM | Mod: NC

## 2021-01-20 ENCOUNTER — APPOINTMENT (OUTPATIENT)
Dept: PHARMACY | Facility: CLINIC | Age: 76
End: 2021-01-20
Payer: SELF-PAY

## 2021-01-20 PROCEDURE — V5299A: CUSTOM | Mod: NC

## 2021-03-05 ENCOUNTER — APPOINTMENT (OUTPATIENT)
Dept: OTOLARYNGOLOGY | Facility: CLINIC | Age: 76
End: 2021-03-05
Payer: MEDICARE

## 2021-03-05 VITALS
TEMPERATURE: 98 F | WEIGHT: 168 LBS | SYSTOLIC BLOOD PRESSURE: 139 MMHG | HEART RATE: 76 BPM | BODY MASS INDEX: 26.37 KG/M2 | DIASTOLIC BLOOD PRESSURE: 80 MMHG | HEIGHT: 67 IN

## 2021-03-05 DIAGNOSIS — H60.8X2 OTHER OTITIS EXTERNA, LEFT EAR: ICD-10-CM

## 2021-03-05 DIAGNOSIS — T16.2XXD FOREIGN BODY IN LEFT EAR, SUBSEQUENT ENCOUNTER: ICD-10-CM

## 2021-03-05 DIAGNOSIS — Z86.69 PERSONAL HISTORY OF OTHER DISEASES OF THE NERVOUS SYSTEM AND SENSE ORGANS: ICD-10-CM

## 2021-03-05 PROCEDURE — 92567 TYMPANOMETRY: CPT

## 2021-03-05 PROCEDURE — 92557 COMPREHENSIVE HEARING TEST: CPT

## 2021-03-05 PROCEDURE — 99214 OFFICE O/P EST MOD 30 MIN: CPT

## 2021-03-05 NOTE — ASSESSMENT
[FreeTextEntry1] : Reviewed and reconciled medications, allergies, PMHx, PSHx, SocHx, FMHx.\par \par h/o myasthenia gravis, bilateral SNHL - wears bilateral amplification, watchman device, a-fib, loop recorder, sleep apnea - uses breathe right strips and sudden left SNHL\par s/p Left Trans-Tympanic Steroid Membrane Injection on 3/30/2020, 4/16/2020 and 5/12/2020\par \par Audio: right mild to moderate-severe SNHL with mixed component at 250 hz, left moderate SNHL with mixed component at 4k hz, right 88% discrimination at 65 db, left 84% discrimination at 85 db, no change from  from prior audio\par \par Plan:\par Cerumen removed. Audio - results interpreted by Dr. Hi and reviewed with the patient. Continue amplification. FU 6 months.

## 2021-03-05 NOTE — REASON FOR VISIT
[Subsequent Evaluation] : a subsequent evaluation for [FreeTextEntry2] : wax removal/ hearing check

## 2021-03-05 NOTE — CONSULT LETTER
[Courtesy Letter:] : I had the pleasure of seeing your patient, [unfilled], in my office today. [Please see my note below.] : Please see my note below. [Consult Closing:] : Thank you very much for allowing me to participate in the care of this patient.  If you have any questions, please do not hesitate to contact me. [Sincerely,] : Sincerely, [FreeTextEntry3] : Gennaro Hi MD FACS

## 2021-03-05 NOTE — HISTORY OF PRESENT ILLNESS
[de-identified] : The patient presents with h/o myasthenia gravis, bilateral SNHL - wears bilateral amplification, watchman device, a-fib, loop recorder, sleep apnea and sudden left SNHL. s/p Left Trans-Tympanic Steroid Membrane Injection on 3/30/2020, 4/16/2020 and 5/12/2020. Pt presents today for cerumen removal and hearing check. He was having a problem with the hearing aid mold fitting correctly.

## 2021-03-05 NOTE — ADDENDUM
[FreeTextEntry1] : Documented by Rebekah Moser acting as scribe for Dr. Hi on 03/05/2021.\par \par All Medical record entries made by the Scribe were at my, Dr. Hi, direction and personally dictated by me on 03/05/2021 . I have reviewed the chart and agree that the record accurately reflects my personal performance of the history, physical exam, assessment and plan. I have also personally directed, reviewed, and agreed with the discharge instructions.

## 2021-03-05 NOTE — PHYSICAL EXAM
[Hearing Vegas Test (Tuning Fork On Forehead)] : no lateralization of tone [] : septum deviated to the left [Midline] : trachea is located in midline position [Clear / Open well] : hypopharynx is clear and opens well [Normal] : no rashes [Hearing Loss Right Only] : normal [Hearing Loss Left Only] : normal [FreeTextEntry9] : cerumen removed via curettage  [de-identified] : highly inflamed [FreeTextEntry1] : elongated uvula

## 2021-04-13 ENCOUNTER — APPOINTMENT (OUTPATIENT)
Dept: PHARMACY | Facility: CLINIC | Age: 76
End: 2021-04-13
Payer: SELF-PAY

## 2021-04-13 PROCEDURE — V5010 ASSESSMENT FOR HEARING AID: CPT | Mod: NC

## 2021-04-21 ENCOUNTER — APPOINTMENT (OUTPATIENT)
Dept: PHARMACY | Facility: CLINIC | Age: 76
End: 2021-04-21
Payer: MEDICARE

## 2021-04-21 PROCEDURE — V5264D: CUSTOM

## 2021-04-21 PROCEDURE — V5261A: CUSTOM

## 2021-05-12 ENCOUNTER — APPOINTMENT (OUTPATIENT)
Dept: PHARMACY | Facility: CLINIC | Age: 76
End: 2021-05-12
Payer: SELF-PAY

## 2021-05-12 PROCEDURE — V5299A: CUSTOM | Mod: NC

## 2021-06-02 ENCOUNTER — APPOINTMENT (OUTPATIENT)
Dept: PHARMACY | Facility: CLINIC | Age: 76
End: 2021-06-02

## 2021-06-16 ENCOUNTER — APPOINTMENT (OUTPATIENT)
Dept: PHARMACY | Facility: CLINIC | Age: 76
End: 2021-06-16
Payer: SELF-PAY

## 2021-06-16 PROCEDURE — V5299A: CUSTOM | Mod: NC

## 2021-09-10 ENCOUNTER — APPOINTMENT (OUTPATIENT)
Dept: OTOLARYNGOLOGY | Facility: CLINIC | Age: 76
End: 2021-09-10
Payer: MEDICARE

## 2021-09-10 VITALS
SYSTOLIC BLOOD PRESSURE: 162 MMHG | HEART RATE: 76 BPM | BODY MASS INDEX: 24.64 KG/M2 | DIASTOLIC BLOOD PRESSURE: 83 MMHG | HEIGHT: 67 IN | WEIGHT: 157 LBS

## 2021-09-10 PROCEDURE — 99213 OFFICE O/P EST LOW 20 MIN: CPT

## 2021-09-10 NOTE — HISTORY OF PRESENT ILLNESS
[de-identified] : The patient presents with h/o myasthenia gravis, bilateral SNHL - wears bilateral amplification, watchman device, a-fib, loop recorder, sleep apnea and sudden left SNHL. s/p Left Trans-Tympanic Steroid Membrane Injection on 3/30/2020, 4/16/2020 and 5/12/2020. Pt presents today for cerumen removal . No change in hearing. He was having a problem with the hearing aid mold fitting correctly. Audiologist has been able to remedy the issue and aids fitting better. last audio done 3/5/2021

## 2021-09-10 NOTE — PHYSICAL EXAM
[Hearing Loss Right Only] : diminished [Hearing Loss Left Only] : diminished [Hearing Vegas Test (Tuning Fork On Forehead)] : no lateralization of tone [] : septum deviated to the left [Normal] : palatine tonsils are normal [FreeTextEntry9] : minimal wax removed by curettage [FreeTextEntry1] : sinuses nontender [de-identified] : elongated uvula

## 2021-09-10 NOTE — ASSESSMENT
[FreeTextEntry1] : The patient presents with h/o myasthenia gravis, bilateral SNHL - wears bilateral amplification, watchman device, a-fib, loop recorder, sleep apnea and sudden left SNHL. s/p Left Trans-Tympanic Steroid Membrane Injection on 3/30/2020, 4/16/2020 and 5/12/2020.\par minimal cerumen in left ear removed\par \par Plan\par follow-up in 6 months\par repeat audio in 6 months

## 2021-09-14 ENCOUNTER — NON-APPOINTMENT (OUTPATIENT)
Age: 76
End: 2021-09-14

## 2021-09-27 ENCOUNTER — APPOINTMENT (OUTPATIENT)
Dept: ORTHOPEDIC SURGERY | Facility: CLINIC | Age: 76
End: 2021-09-27
Payer: MEDICARE

## 2021-09-29 ENCOUNTER — NON-APPOINTMENT (OUTPATIENT)
Age: 76
End: 2021-09-29

## 2021-09-29 ENCOUNTER — APPOINTMENT (OUTPATIENT)
Dept: ORTHOPEDIC SURGERY | Facility: CLINIC | Age: 76
End: 2021-09-29
Payer: MEDICARE

## 2021-09-29 VITALS
HEIGHT: 67 IN | WEIGHT: 157 LBS | HEART RATE: 66 BPM | BODY MASS INDEX: 24.64 KG/M2 | SYSTOLIC BLOOD PRESSURE: 144 MMHG | DIASTOLIC BLOOD PRESSURE: 76 MMHG

## 2021-09-29 DIAGNOSIS — M54.5 LOW BACK PAIN: ICD-10-CM

## 2021-09-29 PROCEDURE — 20553 NJX 1/MLT TRIGGER POINTS 3/>: CPT

## 2021-09-29 PROCEDURE — 99214 OFFICE O/P EST MOD 30 MIN: CPT | Mod: 25

## 2022-01-01 NOTE — DISCHARGE NOTE ADULT - ADMISSION DATE +STARTOFVISITDATE
Statement Selected This note was copied from the mother's chart.  Past Medical History:   Diagnosis Date   • Anemia    • Heart murmur 3/22/2021    at age 11 had ultrasound but than it went away   • Pregnancy    • Pregnancy    • Thyroid disease    .  History reviewed. No pertinent surgical history..  Current Facility-Administered Medications   Medication Dose Route Frequency Provider Last Rate Last Admin   • miSOPROStol (CYTOTEC) tablet 800 mcg  800 mcg Rectal Once Monique Jorge DO       • methylergonovine (METHERGINE) injection 200 mcg  200 mcg Intramuscular Once PRN Monique Jorge DO       • carboprost (HEMABATE) injection 250 mcg  250 mcg Intramuscular Once PRN Monique Jorge DO       • oxyTOCIN (PITOCIN) injection 10 Units  10 Units Intramuscular Once PRN Paulina Kyle MD       • oxytocin (PITOCIN) 30 Units in sodium chloride 0.9% 500 mL  0-334 mL/hr Intravenous Continuous Paulina Kyle MD       • acetaminophen (TYLENOL) tablet 650 mg  650 mg Oral Q6H Paulina Kyle MD   650 mg at 22 1356   • ibuprofen (MOTRIN) tablet 600 mg  600 mg Oral Q6H Paulina Kyle MD   600 mg at 22 0018   • simethicone (MYLICON) tablet 125 mg  125 mg Oral Q4H PRN Paulina Kyle MD       • calcium carbonate (TUMS) chewable tablet 500 mg  500 mg Oral Q4H PRN Paulina Kyle MD       • hydroCORTisone-pramoxine (ANALPRAM-HC) 2.5-1 % rectal cream   Rectal TID PRN Paulina Kyle MD       • polyethylene glycol (MIRALAX) packet 17 g  17 g Oral Daily PRN Paulina Kyle MD       • docusate sodium-sennosides (SENOKOT S) 50-8.6 MG 2 tablet  2 tablet Oral BID PRN Paulina Kyle MD       • bisacodyl (DULCOLAX) suppository 10 mg  10 mg Rectal Daily PRN Paulina Kyle MD       • magnesium hydroxide (MILK OF MAGNESIA) 400 MG/5ML suspension 30 mL  30 mL Oral Daily PRN Paulina Kyle MD       .  OB History    Para Term  AB Living   2 1 1 0 1 1   SAB IAB Ectopic Molar Multiple Live Births   1 0 0 0 0 1   .    P: Knowledge deficit r/t  breastfeeding     I: Met with dyad and family on follow up rounds and assisted with breastfeeding session- maternal request. Mother reports  due to feed but latch was painful and she is afraid to attempt due to pain. Maternal assessment reveals otf but short nipples and right nipple with compression stripe. Tight jaw noted on  oral assessment.  [place] able to organize suck on gloved finger. Demonstrated gentle waking and stimulation techniques, breast massage, manual expression of colostrum and breast compression. Skin to skin with mother initiated. Assisted with positioning in cross cradle and football holds and attempted deep, asymmetric latch but  unable to achieve. Nipple shield was introduced and  achieved a deep latch but session was stopped per maternal request- latch to painful. Discussed plan to continue attempting  at breast, pumping for stimulation and complement feeding until  consistently latching at breast. Assisted with pumping session. Drops of colostrum collected and finger fed to .  was then dropper fed 10 ml of formula per maternal request. Discussed nipple shield use and care, deep, asymmetric latch, nutritive vs non nutritive suck, typical  behaviors, colostrum, skin to skin, 8-12 feeding/ pumping sessions in 24 hours with no time limit at the breast, breast milk production, supply/demand, frequency/duration, signs of effective breastfeeding, gentle waking and stimulation techniques to keep  active while breastfeeding and  output. Encouraged rooming in, and to call for assistance as needed. Lactation education provided. Questions answered. GILLIAN Hanna updated     E: Mother verbalized an understanding of provided information.     P: LC to continue to follow. Instructed Mother to call for assistance and as questions arise.

## 2022-03-11 ENCOUNTER — APPOINTMENT (OUTPATIENT)
Dept: OTOLARYNGOLOGY | Facility: CLINIC | Age: 77
End: 2022-03-11
Payer: MEDICARE

## 2022-03-11 VITALS
SYSTOLIC BLOOD PRESSURE: 123 MMHG | BODY MASS INDEX: 22.6 KG/M2 | DIASTOLIC BLOOD PRESSURE: 74 MMHG | HEIGHT: 67 IN | WEIGHT: 144 LBS | HEART RATE: 60 BPM

## 2022-03-11 DIAGNOSIS — H69.83 OTHER SPECIFIED DISORDERS OF EUSTACHIAN TUBE, BILATERAL: ICD-10-CM

## 2022-03-11 PROCEDURE — 99214 OFFICE O/P EST MOD 30 MIN: CPT

## 2022-03-11 PROCEDURE — 92567 TYMPANOMETRY: CPT

## 2022-03-11 PROCEDURE — 92557 COMPREHENSIVE HEARING TEST: CPT

## 2022-03-11 RX ORDER — FAMOTIDINE 40 MG/1
40 TABLET, FILM COATED ORAL
Qty: 30 | Refills: 5 | Status: ACTIVE | COMMUNITY
Start: 2022-03-11

## 2022-03-11 RX ORDER — AZELASTINE HYDROCHLORIDE 137 UG/1
0.1 SPRAY, METERED NASAL
Refills: 0 | Status: DISCONTINUED | COMMUNITY
End: 2022-03-11

## 2022-03-11 RX ORDER — FAMOTIDINE 20 MG/1
20 TABLET, FILM COATED ORAL
Refills: 0 | Status: DISCONTINUED | COMMUNITY
End: 2022-03-11

## 2022-03-11 RX ORDER — AZELASTINE HYDROCHLORIDE AND FLUTICASONE PROPIONATE 137; 50 UG/1; UG/1
137-50 SPRAY, METERED NASAL
Qty: 1 | Refills: 5 | Status: ACTIVE | COMMUNITY
Start: 2022-03-11 | End: 1900-01-01

## 2022-03-11 RX ORDER — ECONAZOLE NITRATE 10 MG/G
1 CREAM TOPICAL
Refills: 0 | Status: DISCONTINUED | COMMUNITY
End: 2022-03-11

## 2022-03-11 NOTE — PHYSICAL EXAM
[Normal] : mucosa is normal [Midline] : trachea located in midline position [Hearing Loss Right Only] : normal [Hearing Loss Left Only] : normal [FreeTextEntry1] : deviated septum \par mildly inflamed turbs  [de-identified] : elongated uvula

## 2022-03-11 NOTE — ADDENDUM
[FreeTextEntry1] : Documented by Jr Quintero acting as scribe for Dr. Hi on 03/11/2022. \par \par All Medical record entries made by the scribe were at my. Dr. Hi direction and personally dictated by me on 03/11/2022. I have reviewed the chart and agree that the record accurately reflects my personal performance of the history, physical exam, assessment and plan. I have also personally directed, reviewed, and agreed with the discharge instructions.

## 2022-03-11 NOTE — HISTORY OF PRESENT ILLNESS
[de-identified] : The patient presents with h/o myasthenia gravis, bilateral SNHL - wears bilateral amplification, watchman device, a-fib, loop recorder, sleep apnea and sudden left SNHL. s/p Left Trans-Tympanic Steroid Membrane Injection on 3/30/2020, 4/16/2020 and 5/12/2020. The patient presents today for a follow up. Pt reports doing better with the hearing aids. Pt reports taking famotidine at night. Pt reports feeling clogged ears once in a while. Pt states astelin making him feel sore.

## 2022-03-11 NOTE — ASSESSMENT
[FreeTextEntry1] : Reviewed and reconciled medications, allergies, PMHx, PSHx, SocHx, FMHx. \par \par h/o myasthenia gravis, bilateral SNHL - wears bilateral amplification, watchman device, a-fib, loop recorder, sleep apnea and sudden left SNHL. s/p Left Trans-Tympanic Steroid Membrane Injection on 3/30/2020, 4/16/2020 and 5/12/2020\par \par Audio:\par type A tymp AD; type As tymp AS\par ETF abnormal AU\par AD: mild to mod-severe snhl 250-8000 Hz\par AS: mod to mod-severe  snhl 250-8000 Hz\par right 88% discrim 80db\par left 80% discrim 80db\par bilateral eustachian tube dysfunction\par asym hearing loss left ear slightly worse then right \par \par Plan:\par Audio - results interpreted by Dr. Hi and reviewed with the patient. Dymista - 1 spray bilaterally BID, spray laterally. FU 6 months \par \par \par \par

## 2022-03-18 NOTE — H&P PST ADULT - NS NEC GEN PE MLT EXAM PC
Addended by: RICHARD CANSECO on: 3/18/2022 08:56 AM     Modules accepted: Orders     No bruits; no thyromegaly or nodules

## 2022-05-10 DIAGNOSIS — Z01.818 ENCOUNTER FOR OTHER PREPROCEDURAL EXAMINATION: ICD-10-CM

## 2022-05-18 ENCOUNTER — LABORATORY RESULT (OUTPATIENT)
Age: 77
End: 2022-05-18

## 2022-05-18 ENCOUNTER — OUTPATIENT (OUTPATIENT)
Dept: OUTPATIENT SERVICES | Facility: HOSPITAL | Age: 77
LOS: 1 days | End: 2022-05-18
Payer: MEDICARE

## 2022-05-18 DIAGNOSIS — Z95.828 PRESENCE OF OTHER VASCULAR IMPLANTS AND GRAFTS: Chronic | ICD-10-CM

## 2022-05-18 DIAGNOSIS — Z98.890 OTHER SPECIFIED POSTPROCEDURAL STATES: Chronic | ICD-10-CM

## 2022-05-18 DIAGNOSIS — H26.8 OTHER SPECIFIED CATARACT: Chronic | ICD-10-CM

## 2022-05-18 DIAGNOSIS — Z11.52 ENCOUNTER FOR SCREENING FOR COVID-19: ICD-10-CM

## 2022-05-18 DIAGNOSIS — Z95.818 PRESENCE OF OTHER CARDIAC IMPLANTS AND GRAFTS: Chronic | ICD-10-CM

## 2022-05-18 LAB — SARS-COV-2 RNA SPEC QL NAA+PROBE: SIGNIFICANT CHANGE UP

## 2022-05-18 PROCEDURE — U0003: CPT

## 2022-05-18 PROCEDURE — U0005: CPT

## 2022-05-18 PROCEDURE — C9803: CPT

## 2022-05-20 ENCOUNTER — TRANSCRIPTION ENCOUNTER (OUTPATIENT)
Age: 77
End: 2022-05-20

## 2022-05-20 ENCOUNTER — OUTPATIENT (OUTPATIENT)
Dept: OUTPATIENT SERVICES | Facility: HOSPITAL | Age: 77
LOS: 1 days | End: 2022-05-20
Payer: MEDICARE

## 2022-05-20 VITALS
SYSTOLIC BLOOD PRESSURE: 124 MMHG | DIASTOLIC BLOOD PRESSURE: 71 MMHG | OXYGEN SATURATION: 99 % | RESPIRATION RATE: 18 BRPM | HEART RATE: 58 BPM

## 2022-05-20 VITALS
RESPIRATION RATE: 18 BRPM | OXYGEN SATURATION: 99 % | SYSTOLIC BLOOD PRESSURE: 121 MMHG | HEART RATE: 50 BPM | WEIGHT: 138.01 LBS | HEIGHT: 67 IN | DIASTOLIC BLOOD PRESSURE: 63 MMHG | TEMPERATURE: 98 F

## 2022-05-20 DIAGNOSIS — Z95.818 PRESENCE OF OTHER CARDIAC IMPLANTS AND GRAFTS: Chronic | ICD-10-CM

## 2022-05-20 DIAGNOSIS — Z98.890 OTHER SPECIFIED POSTPROCEDURAL STATES: Chronic | ICD-10-CM

## 2022-05-20 DIAGNOSIS — Z95.828 PRESENCE OF OTHER VASCULAR IMPLANTS AND GRAFTS: Chronic | ICD-10-CM

## 2022-05-20 DIAGNOSIS — H26.8 OTHER SPECIFIED CATARACT: Chronic | ICD-10-CM

## 2022-05-20 DIAGNOSIS — R93.5 ABNORMAL FINDINGS ON DIAGNOSTIC IMAGING OF OTHER ABDOMINAL REGIONS, INCLUDING RETROPERITONEUM: ICD-10-CM

## 2022-05-20 LAB
ALBUMIN SERPL ELPH-MCNC: 4.4 G/DL — SIGNIFICANT CHANGE UP (ref 3.3–5)
ALP SERPL-CCNC: 65 U/L — SIGNIFICANT CHANGE UP (ref 40–120)
ALT FLD-CCNC: 23 U/L — SIGNIFICANT CHANGE UP (ref 10–45)
ANION GAP SERPL CALC-SCNC: 8 MMOL/L — SIGNIFICANT CHANGE UP (ref 5–17)
AST SERPL-CCNC: 19 U/L — SIGNIFICANT CHANGE UP (ref 10–40)
BILIRUB SERPL-MCNC: 0.3 MG/DL — SIGNIFICANT CHANGE UP (ref 0.2–1.2)
BUN SERPL-MCNC: 12 MG/DL — SIGNIFICANT CHANGE UP (ref 7–23)
CALCIUM SERPL-MCNC: 9.3 MG/DL — SIGNIFICANT CHANGE UP (ref 8.4–10.5)
CHLORIDE SERPL-SCNC: 106 MMOL/L — SIGNIFICANT CHANGE UP (ref 96–108)
CO2 SERPL-SCNC: 27 MMOL/L — SIGNIFICANT CHANGE UP (ref 22–31)
CREAT SERPL-MCNC: 0.77 MG/DL — SIGNIFICANT CHANGE UP (ref 0.5–1.3)
EGFR: 93 ML/MIN/1.73M2 — SIGNIFICANT CHANGE UP
GLUCOSE SERPL-MCNC: 91 MG/DL — SIGNIFICANT CHANGE UP (ref 70–99)
HCT VFR BLD CALC: 42.4 % — SIGNIFICANT CHANGE UP (ref 39–50)
HGB BLD-MCNC: 13.6 G/DL — SIGNIFICANT CHANGE UP (ref 13–17)
INR BLD: 1.09 RATIO — SIGNIFICANT CHANGE UP (ref 0.88–1.16)
MCHC RBC-ENTMCNC: 30.2 PG — SIGNIFICANT CHANGE UP (ref 27–34)
MCHC RBC-ENTMCNC: 32.1 GM/DL — SIGNIFICANT CHANGE UP (ref 32–36)
MCV RBC AUTO: 94.2 FL — SIGNIFICANT CHANGE UP (ref 80–100)
NRBC # BLD: 0 /100 WBCS — SIGNIFICANT CHANGE UP (ref 0–0)
PLATELET # BLD AUTO: 273 K/UL — SIGNIFICANT CHANGE UP (ref 150–400)
POTASSIUM SERPL-MCNC: 4.4 MMOL/L — SIGNIFICANT CHANGE UP (ref 3.5–5.3)
POTASSIUM SERPL-SCNC: 4.4 MMOL/L — SIGNIFICANT CHANGE UP (ref 3.5–5.3)
PROT SERPL-MCNC: 6.3 G/DL — SIGNIFICANT CHANGE UP (ref 6–8.3)
PROTHROM AB SERPL-ACNC: 12.6 SEC — SIGNIFICANT CHANGE UP (ref 10.5–13.4)
RBC # BLD: 4.5 M/UL — SIGNIFICANT CHANGE UP (ref 4.2–5.8)
RBC # FLD: 13.2 % — SIGNIFICANT CHANGE UP (ref 10.3–14.5)
SODIUM SERPL-SCNC: 141 MMOL/L — SIGNIFICANT CHANGE UP (ref 135–145)
WBC # BLD: 6.46 K/UL — SIGNIFICANT CHANGE UP (ref 3.8–10.5)
WBC # FLD AUTO: 6.46 K/UL — SIGNIFICANT CHANGE UP (ref 3.8–10.5)

## 2022-05-20 PROCEDURE — 80053 COMPREHEN METABOLIC PANEL: CPT

## 2022-05-20 PROCEDURE — 36590 REMOVAL TUNNELED CV CATH: CPT

## 2022-05-20 PROCEDURE — 85610 PROTHROMBIN TIME: CPT

## 2022-05-20 PROCEDURE — 77001 FLUOROGUIDE FOR VEIN DEVICE: CPT

## 2022-05-20 PROCEDURE — 77001 FLUOROGUIDE FOR VEIN DEVICE: CPT | Mod: 26

## 2022-05-20 PROCEDURE — 36415 COLL VENOUS BLD VENIPUNCTURE: CPT

## 2022-05-20 PROCEDURE — 85027 COMPLETE CBC AUTOMATED: CPT

## 2022-05-20 RX ORDER — SODIUM CHLORIDE 9 MG/ML
1000 INJECTION, SOLUTION INTRAVENOUS
Refills: 0 | Status: DISCONTINUED | OUTPATIENT
Start: 2022-05-20 | End: 2022-06-03

## 2022-05-20 RX ORDER — LIDOCAINE 4 G/100G
1 CREAM TOPICAL
Qty: 0 | Refills: 0 | DISCHARGE

## 2022-05-20 RX ORDER — SENNA PLUS 8.6 MG/1
1 TABLET ORAL
Qty: 0 | Refills: 0 | DISCHARGE

## 2022-05-20 NOTE — ASU DISCHARGE PLAN (ADULT/PEDIATRIC) - ASU DC SPECIAL INSTRUCTIONSFT
Chest Port Removal    Discharge Instructions  - You have had a chest port removed from your chest.   - You may shower in 48 hours. No soaking or swimming for 2 weeks or until the site is completely healed.  - Keep the area covered and dry for the next 7 days.  - Do not perform any heavy lifting or put tension on the area for the next week or until the site is healed.  - Do not remove steri-strips. They will fall off in the next 2-3 weeks.  - You may resume your normal diet.  - You may resume your normal medications however you should wait 48 hours before restarting aspirin, plavix, or blood thinners.  - It is normal to experience some pain over the site for the next few days. You may take apply ice to the area (20 minutes on, 20 minutes off) and take Tylenol for that pain. Do not take more frequently than every 6 hours and do not exceed more than 3000mg of Tylenol in a 24 hour period.    - You were given conscious sedation which may make you drowsy, therefore you need someone to stay with you until the morning following the procedure.  - Do not drive, engage in heavy lifting or strenuous activity, or drink any alcoholic beverages for the next 24 hours.   - You may resume normal activity in 24 hours.    Notify your primary physician and/or Interventional Radiology IMMEDIATELY if you experience any of the following       - Fever of 100.5F       - Chills or Rigors/ Shakes       - Swelling and/or Redness in the area around the port removal site       - Worsening Pain       - Blood soaked bandages or worsening bleeding       - Lightheadedness and/or dizziness upon standing       - Chest Pain/ Tightness       - Shortness of Breath       - Difficulty walking    If you have a problem that you believe requires IMMEDIATE attention, please go to your NEAREST Emergency Room. If you believe your problem can safely wait until you speak to a physician, please call Interventional Radiology for any concerns.    During Normal Weekday Business Hours- You can contact the Interventional Radiology department during normal business hours via telephone.  During Evenings and Weekends- If you need to contact Interventional Radiology during off hours, do so by calling the hospital and requesting to be connected to the Interventional Radiologist on call.

## 2022-05-20 NOTE — PRE-ANESTHESIA EVALUATION ADULT - ANESTHESIA, PREVIOUS REACTION, PROFILE
02/13/20                            Jennifer Samuel  [address] [address]    To Whom It May Concern:    This is to certify Jennifer Samuel was evaluated with Seferino Joshi DO on 02/11/2020 and can return to regular work on Monday February 17th, 2020    RESTRICTIONS:Please excuse Jennifer from work 02/11/2020-02/17/2020 due to having influenza.          Seferino Joshi DO  [facility] [address] [address] [employer] [facility] [address]  Phone: [phone]     none

## 2022-05-20 NOTE — ASU DISCHARGE PLAN (ADULT/PEDIATRIC) - NS MD DC FALL RISK RISK
For information on Fall & Injury Prevention, visit: [email or web address] OR  [email or web address] OR  https://www.cdc.gov/steadi/patient.html

## 2022-05-20 NOTE — PRE-ANESTHESIA EVALUATION ADULT - NSANTHOSAYNRD_GEN_A_CORE
No. MAUDE screening performed.  STOP BANG Legend: 0-2 = LOW Risk; 3-4 = INTERMEDIATE Risk; 5-8 = HIGH Risk

## 2022-05-20 NOTE — ASU PATIENT PROFILE, ADULT - ABILITY TO HEAR (WITH HEARING AID OR HEARING APPLIANCE IF NORMALLY USED):
hearing  aids/Mildly to Moderately Impaired: difficulty hearing in some environments or speaker may need to increase volume or speak distinctly

## 2022-05-20 NOTE — ASU PATIENT PROFILE, ADULT - FALL HARM RISK - UNIVERSAL INTERVENTIONS
Bed in lowest position, wheels locked, appropriate side rails in place/Call bell, personal items and telephone in reach/Instruct patient to call for assistance before getting out of bed or chair/Non-slip footwear when patient is out of bed/[place] to call system/Physically safe environment - no spills, clutter or unnecessary equipment/Purposeful Proactive Rounding/Room/bathroom lighting operational, light cord in reach

## 2022-05-20 NOTE — PRE PROCEDURE NOTE - PRE PROCEDURE EVALUATION
HPI: 77y Male with myasthenia gravis in remission for 2.5 years. For port removal.    Allergies: immune globulin intravenous (Unknown)  penicillins (Rash)    Medications (Abx/Cardiac/Anticoagulation/Blood Products)      Data:  170.2  62.6  T(C): 36.5  HR: 50  BP: 121/63  RR: 18  SpO2: 99%    Exam  General: NAD  Chest: Nonlabored breathing  Extremities: WWP    -WBC 6.46 / HgB 13.6 / Hct 42.4 / Plt 273  -Na 141 / Cl 106 / BUN 12 / Glucose 91  -K 4.4 / CO2 27 / Cr 0.77  -ALT 23 / Alk Phos 65 / T.Bili 0.3  -INR1.09    Imaging: Reviewed    Plan:   -77y Male presents for port removal.  -Risks/Benefits/alternatives explained with the patient and/or healthcare proxy and witnessed informed consent obtained.

## 2022-05-20 NOTE — ASU PATIENT PROFILE, ADULT - NSICDXPASTSURGICALHX_GEN_ALL_CORE_FT
PAST SURGICAL HISTORY:  Central venous catheter in place, temporary vortex port implant on right chest    H/O cardiac radiofrequency ablation May 2018    H/O coronary angiogram 3/2012 no stents    H/O nasal polypectomy wears nasal strip for better breathing    Other cataract right    Status post placement of implantable loop recorder 4/2018 left chest

## 2022-05-20 NOTE — ASU PATIENT PROFILE, ADULT - FALL HARM RISK - TYPE OF ASSESSMENT
Patient called stating the Sejal Mortensen has not received the paper work yet and she was trying to follow up to see where the paper work was, if its done, etc?      Please call to advise
Spoke to patient. Will complete form and send form along with office note. Patient would like new medication for sleep. Script has sent to the pharmacy.     Patient instructed to stop Clonazepam
Admission

## 2022-05-20 NOTE — ASU PATIENT PROFILE, ADULT - NSICDXPASTMEDICALHX_GEN_ALL_CORE_FT
PAST MEDICAL HISTORY:  Atrial fibrillation, unspecified type     Benign prostatic hyperplasia, presence of lower urinary tract symptoms unspecified, unspecified morphology     GERD (gastroesophageal reflux disease)     Glaucoma     Herpes virus disease right eye    History of myasthenia gravis 2016 , no weakness, in remission    HLD (hyperlipidemia)     Iron deficiency anemia     Nasal polyp     Osteopenia     Right hip pain avasular necrosis of right femoral head 2018    TIA (transient ischemic attack) 2006

## 2022-05-20 NOTE — PROCEDURE NOTE - LOCAL ANESTHESIA
1700 PATIENT TO [facility] TODAY VIA AMBULANCE FOR SCHEDULED BLOOD TRANSFUSION, 2 UNITS PRBC'S GIVEN WITHOUT S/S OF REACTION. FIRST IV DID INFILTRATE, WARM COMPRESS APPLIED & ARM ELEVATED ON PILLOW, VERY BRUISED RIGHT UPPER ARM BY THE END OF THIS SHIFT. NURSING HOME NOTIFIED, TALKED WITH PATIENTS NURSE. 1700 BLOOD INFUSED, WAITING FOR AMBULANCE TO  FOR TRANSPORT TO NURSING FACILITY. 1728 [facility] HERE, TRANSPORTED BACK TO .   1% Lidocaine

## 2022-05-25 DIAGNOSIS — G70.00 MYASTHENIA GRAVIS WITHOUT (ACUTE) EXACERBATION: ICD-10-CM

## 2022-09-09 ENCOUNTER — APPOINTMENT (OUTPATIENT)
Dept: OTOLARYNGOLOGY | Facility: CLINIC | Age: 77
End: 2022-09-09

## 2022-09-09 VITALS
DIASTOLIC BLOOD PRESSURE: 73 MMHG | WEIGHT: 138 LBS | SYSTOLIC BLOOD PRESSURE: 137 MMHG | HEIGHT: 67 IN | BODY MASS INDEX: 21.66 KG/M2 | HEART RATE: 57 BPM

## 2022-09-09 PROCEDURE — 92557 COMPREHENSIVE HEARING TEST: CPT

## 2022-09-09 PROCEDURE — 99214 OFFICE O/P EST MOD 30 MIN: CPT | Mod: 25

## 2022-09-09 PROCEDURE — 31575 DIAGNOSTIC LARYNGOSCOPY: CPT

## 2022-09-09 PROCEDURE — 92567 TYMPANOMETRY: CPT

## 2022-09-09 RX ORDER — LIDOCAINE 5% 5 G/100G
5 CREAM TOPICAL
Refills: 0 | Status: DISCONTINUED | COMMUNITY
End: 2022-09-09

## 2022-09-09 RX ORDER — PYRIDOSTIGMINE BROMIDE 30 MG/1
TABLET ORAL
Refills: 0 | Status: DISCONTINUED | COMMUNITY
End: 2022-09-09

## 2022-09-09 NOTE — HISTORY OF PRESENT ILLNESS
[de-identified] : The patient presents with h/o myasthenia gravis, bilateral SNHL - wears bilateral amplification, watchman device, a-fib, loop recorder, sleep apnea and sudden left SNHL. s/p Left Trans-Tympanic Steroid Membrane Injection on 3/30/2020, 4/16/2020 and 5/12/2020. The patient presents today for 6 months follow up. Pt reports his hearing and reflux has been okay but he has been having constant diarrhea which is being followed by gastro and dietitian. Pt has also been taking famotidine for reflux - which isnt causing diarrhea since he already tried changing it but that didn’t help. Pt denies still having sleep apnea.

## 2022-09-09 NOTE — PHYSICAL EXAM
[] : septum deviated to the right [Midline] : trachea located in midline position [Normal] : no masses and lesions seen, face is symmetric [de-identified] : right submandibular gland little brennan then left - feels like a stone in right  [FreeTextEntry5] : no response to tuning fork  [de-identified] : inflamed and allergic turbs  [de-identified] : elongated floppy uvula, right floor of mouth little brennan then left  [FreeTextEntry2] : sinuses nontender to percussion

## 2022-09-09 NOTE — ASSESSMENT
[FreeTextEntry1] : Reviewed and reconciled medications, allergies, PMHx, PSHx, SocHx, FMHx. \par \par h/o myasthenia gravis, bilateral SNHL - wears bilateral amplification, watchman device, a-fib, loop recorder, sleep apnea and sudden left SNHL. s/p Left Trans-Tympanic Steroid Membrane Injection on 3/30/2020, 4/16/2020 and 5/12/2020. \par The patient presents today for 6 months follow up.\par \par Physical exam - \par right submandibular gland little brennan then left - feels like a stone in right \par deviated septum to the right, inflamed and allergic turbs \par elongated floppy uvula, right floor of mouth little brennan then left \par \par Flexible laryngoscopy - \par inflamed turbs, normal nasopharynx, BOT normal, uvula extends all the way down to BOT, minimal edema of arytenoids, mucus just sitting at BOT\par \par Audio:\par - TYPE A IN THE RIGHT, TYPE As IN THE LEFT\par RIGHT: MILD TO MOD-SEVERE/SEVERE SNHL\par LEFT: MODERATE TO MOD-SEVERE SNHL \par right 92% discrim 85db TPP -77\par left 68% discrim 95db TPP -30\par \par Plan:\par suck on lemon candy once in a while. Flexible laryngoscopy. drink lots of water. US neck and MRI head ordered. Audio - results interpreted by Dr. Hi and reviewed with the patient. FU after test

## 2022-09-09 NOTE — DATA REVIEWED
[de-identified] : - TYPE A IN THE RIGHT, TYPE As IN THE LEFT\par RIGHT: MILD TO MOD-SEVERE/SEVERE SNHL\par LEFT: MODERATE TO MOD-SEVERE SNHL \par REC: 1) ENT F/U 2) ANNUAL RE-EVAL/PRN 3) CONTINUED USE OF HEARING AIDS

## 2022-09-09 NOTE — ADDENDUM
[FreeTextEntry1] : Documented by Jr Quintero acting as scribe for Dr. Hi on 09/09/2022. \par \par All Medical record entries made by the scribe were at my. Dr. Hi direction and personally dictated by me on 09/09/2022. I have reviewed the chart and agree that the record accurately reflects my personal performance of the history, physical exam, assessment and plan. I have also personally directed, reviewed, and agreed with the discharge instructions.

## 2022-09-09 NOTE — PROCEDURE
[Unable to Cooperate with Mirror] : patient unable to cooperate with mirror [None] : none [de-identified] : Procedure: Flexible Fiberoptic Laryngoscopy: Risks, benefits, and alternatives of flexible endoscopy were explained to the patient. The patient gave oral consent to proceed. The flexible scope was inserted into the right nasal cavity and advanced towards the nasopharynx. Visualized mucosa over the turbinates and septum were as described above. inflamed turbs. The nasopharynx was clear. Oropharyngeal walls were symmetric and mobile without lesion, mass, or edema. Hypopharynx was also without lesion or edema. Larynx was mobile without lesions. minimal edema of arytenoids. True vocal folds were white without mass or lesion. normal BOT. uvula extends all the way down to BOT, mucus just sitting at BOT [de-identified] : brennan right submandiular gland and right floor of mouth

## 2022-09-12 ENCOUNTER — OUTPATIENT (OUTPATIENT)
Dept: OUTPATIENT SERVICES | Facility: HOSPITAL | Age: 77
LOS: 1 days | Discharge: HOME | End: 2022-09-12

## 2022-09-12 DIAGNOSIS — Z95.828 PRESENCE OF OTHER VASCULAR IMPLANTS AND GRAFTS: Chronic | ICD-10-CM

## 2022-09-12 DIAGNOSIS — Z98.890 OTHER SPECIFIED POSTPROCEDURAL STATES: Chronic | ICD-10-CM

## 2022-09-12 DIAGNOSIS — H26.8 OTHER SPECIFIED CATARACT: Chronic | ICD-10-CM

## 2022-09-12 DIAGNOSIS — Z95.818 PRESENCE OF OTHER CARDIAC IMPLANTS AND GRAFTS: Chronic | ICD-10-CM

## 2022-09-12 PROCEDURE — 90792 PSYCH DIAG EVAL W/MED SRVCS: CPT | Mod: 95

## 2022-09-13 DIAGNOSIS — F43.23 ADJUSTMENT DISORDER WITH MIXED ANXIETY AND DEPRESSED MOOD: ICD-10-CM

## 2022-10-10 ENCOUNTER — APPOINTMENT (OUTPATIENT)
Dept: OTOLARYNGOLOGY | Facility: CLINIC | Age: 77
End: 2022-10-10

## 2022-10-10 VITALS
DIASTOLIC BLOOD PRESSURE: 75 MMHG | HEART RATE: 69 BPM | BODY MASS INDEX: 21.66 KG/M2 | HEIGHT: 67 IN | SYSTOLIC BLOOD PRESSURE: 130 MMHG | WEIGHT: 138 LBS

## 2022-10-10 DIAGNOSIS — K11.20 SIALOADENITIS, UNSPECIFIED: ICD-10-CM

## 2022-10-10 DIAGNOSIS — I77.9 DISORDER OF ARTERIES AND ARTERIOLES, UNSPECIFIED: ICD-10-CM

## 2022-10-10 PROCEDURE — 99214 OFFICE O/P EST MOD 30 MIN: CPT

## 2022-10-10 RX ORDER — LATANOPROST/PF 0.005 %
0.01 DROPS OPHTHALMIC (EYE)
Qty: 5 | Refills: 0 | Status: ACTIVE | COMMUNITY
Start: 2022-09-29

## 2022-10-10 RX ORDER — MAGNESIUM 200 MG
1000 TABLET ORAL
Qty: 90 | Refills: 0 | Status: ACTIVE | COMMUNITY
Start: 2022-09-26

## 2022-10-10 NOTE — HISTORY OF PRESENT ILLNESS
[de-identified] : Pt presents with h/o myasthenia gravis, bilateral SNHL - wears bilateral amplification, watchman device, a-fib, loop recorder, sleep apnea and sudden left SNHL. s/p Left Trans-Tympanic Steroid Membrane Injection on 3/30/2020, 2020 and 2020. Pt presents today to discuss MRI and Xray results. Pt notes his hearing aid  recently and needs to meet with audio.

## 2022-10-10 NOTE — PHYSICAL EXAM
[Normal] : orientation to person, place, and time: normal [FreeTextEntry1] : lump between carotid artery and enlarged submandibular gland on right side

## 2022-10-10 NOTE — ADDENDUM
[FreeTextEntry1] : Documented by Emily Montejo acting as scribe for Dr. Hi on 10/10/2022.\par \par All Medical record entries made by the scribe were at my, Dr. Hi,direction and personally dictated by me on 10/10/2022. I have reviewed the chart and agree that the record accurately reflects my personal performance of the history, physical exam, assessment and plan. I have also personally directed, reviewed, and agreed with the discharge instructions.

## 2022-10-10 NOTE — ASSESSMENT
[FreeTextEntry1] : Reviewed and reconciled medications, allergies, PMHx, PSHx, SocHx, FMHx.\par \par h/o myasthenia gravis, bilateral SNHL - wears bilateral amplification, watchman device, a-fib, loop recorder, sleep apnea and sudden left SNHL. s/p Left Trans-Tympanic Steroid Membrane Injection on 3/30/2020, 4/16/2020 and 5/12/2020.\par presents today for test results\par \par Physical Exam:\par - lump between carotid artery and enlarged submandibular gland on right side\par \par Xray soft tissue neck 9/16/22\par normal, some degenerative changes of the spine \par \par MRI brain 9/16/22\par mild chronic microvascular changes, otherwise normal\par \par Plan:\par Discussed MRI brain and Xray results. Carotid study ordered - consult with cardiologist. More the 50% of time was spent counseling the patient. FU 6 months.

## 2023-01-04 ENCOUNTER — APPOINTMENT (OUTPATIENT)
Dept: ORTHOPEDIC SURGERY | Facility: CLINIC | Age: 78
End: 2023-01-04
Payer: MEDICARE

## 2023-01-04 VITALS
DIASTOLIC BLOOD PRESSURE: 83 MMHG | SYSTOLIC BLOOD PRESSURE: 160 MMHG | WEIGHT: 136 LBS | HEART RATE: 47 BPM | BODY MASS INDEX: 21.86 KG/M2 | OXYGEN SATURATION: 97 % | TEMPERATURE: 98.3 F | HEIGHT: 66 IN

## 2023-01-04 DIAGNOSIS — M60.9 MYOSITIS, UNSPECIFIED: ICD-10-CM

## 2023-01-04 DIAGNOSIS — M79.10 MYALGIA, UNSPECIFIED SITE: ICD-10-CM

## 2023-01-04 DIAGNOSIS — M51.36 OTHER INTERVERTEBRAL DISC DEGENERATION, LUMBAR REGION: ICD-10-CM

## 2023-01-04 PROCEDURE — 20552 NJX 1/MLT TRIGGER POINT 1/2: CPT

## 2023-01-04 PROCEDURE — 99214 OFFICE O/P EST MOD 30 MIN: CPT | Mod: 25

## 2023-01-04 NOTE — PHYSICAL EXAM
[Normal] : Gait: normal [de-identified] : 5 out of 5 motor strength, sensation is intact and symmetrical full range of motion flexion extension and rotation, no palpatory tenderness full range of motion of hips knees shoulders and elbows (all four extremities), no atrophy, negative straight leg raise, no pathological reflexes, no swelling, normal ambulation, no apparent distress skin is intact, no palpable lymph nodes, no upper or lower extremity instability, alert and oriented x3 and normal mood. Normal finger-to nose test.\par Pain over right SI joint.  [de-identified] : XR AP Lat Lumbar 01/04/2023 -Mild scoliosis -reviewed with patient.

## 2023-01-04 NOTE — DISCUSSION/SUMMARY
[de-identified] : Low back pain.\par Right sacroiliitis.\par Discussed all options.\par I offered an injection after all risks were explained including allergic reaction to an infection under sterile conditions 1 mg of Depo-Medrol and 2 cc of 1% lidocaine without epinephrine was injected into the painful site. The patient tolerated the procedure well and received significant relief following the injection.\par All questions were answered, all alternatives discussed and the patient is in complete agreement with that plan. Follow-up appointment as instructed. Any issues and the patient will call or come in sooner. All options discussed including rest, medicine, home exercise, acupuncture, Chiropractic care, Physical Therapy, Pain management, and last resort surgery. \par Wife agrees with the plan. \par Seeing rheumatologist-Prolia.

## 2023-01-04 NOTE — HISTORY OF PRESENT ILLNESS
[Stable] : stable [de-identified] : 76 year male presents for follow up of evaluation of right sided lower back pain. \maximiliano Last seen in Sept 2021 and was given R SI joint injection. \par On 12/22/22, he got up from a seated position and felt pain at the R SI joint that radiated down the posterior leg and wraps around to the right lateral calf to the dorsum of the foot.\maximiliano Has GI issues and does not take NSAIDs. He was cleared to take a small amount of NSAIDs by his GI doc. \par Prolonged standing and walking aggravates the pain. \par Has not tried PT or chiropractic care. \par Ambulates with a cane. \par On Prolia for osteoporosis, is due for next injection on 2/14. \par No fever chills sweats nausea vomiting no bowel or bladder dysfunction, no recent weight loss or gain no night pain. This history is in addition to the intake form that I personally reviewed.

## 2023-01-04 NOTE — ADDENDUM
[FreeTextEntry1] : This note was written by Fernando Rodgers on 01/04/2023 acting as scribe for Dr. Mario Harkins M.D.\par \par I, Mario Harkins MD, have read and attest that all the information, medical decision making and discharge instructions within are true and accurate.

## 2023-02-07 ENCOUNTER — APPOINTMENT (OUTPATIENT)
Dept: PHARMACY | Facility: CLINIC | Age: 78
End: 2023-02-07
Payer: SELF-PAY

## 2023-02-07 PROCEDURE — V5299A: CUSTOM | Mod: NC

## 2023-02-17 ENCOUNTER — OFFICE (OUTPATIENT)
Dept: URBAN - METROPOLITAN AREA CLINIC 109 | Facility: CLINIC | Age: 78
Setting detail: OPHTHALMOLOGY
End: 2023-02-17
Payer: MEDICARE

## 2023-02-17 VITALS — HEIGHT: 60 IN

## 2023-02-17 DIAGNOSIS — H26.492: ICD-10-CM

## 2023-02-17 DIAGNOSIS — H40.043: ICD-10-CM

## 2023-02-17 DIAGNOSIS — B00.52: ICD-10-CM

## 2023-02-17 PROCEDURE — 99213 OFFICE O/P EST LOW 20 MIN: CPT | Performed by: OPHTHALMOLOGY

## 2023-02-17 PROCEDURE — 92083 EXTENDED VISUAL FIELD XM: CPT | Performed by: OPHTHALMOLOGY

## 2023-02-17 PROCEDURE — 92133 CPTRZD OPH DX IMG PST SGM ON: CPT | Performed by: OPHTHALMOLOGY

## 2023-02-17 ASSESSMENT — REFRACTION_AUTOREFRACTION
OD_SPHERE: +0.75
OS_SPHERE: +1.50
OS_CYLINDER: -1.00
OD_CYLINDER: -1.00
OD_AXIS: 92
OS_AXIS: 88

## 2023-02-17 ASSESSMENT — PACHYMETRY
OD_CT_CORRECTION: -1
OS_CT_CORRECTION: -1
OS_CT_UM: 567
OD_CT_UM: 552

## 2023-02-17 ASSESSMENT — REFRACTION_MANIFEST
OD_SPHERE: -0.50
OS_ADD: +2.50
OS_SPHERE: +0.50
OD_VA1: 20/20-
OS_VA1: 20/20-
OD_ADD: +2.50

## 2023-02-17 ASSESSMENT — CONFRONTATIONAL VISUAL FIELD TEST (CVF)
OD_FINDINGS: FULL
OS_FINDINGS: FULL

## 2023-02-17 ASSESSMENT — SPHEQUIV_DERIVED
OD_SPHEQUIV: 0.25
OS_SPHEQUIV: 1

## 2023-02-17 ASSESSMENT — TONOMETRY
OS_IOP_MMHG: 18
OD_IOP_MMHG: 18

## 2023-02-17 ASSESSMENT — VISUAL ACUITY
OD_BCVA: 20/30+2
OS_BCVA: 20/20-2

## 2023-04-17 ENCOUNTER — APPOINTMENT (OUTPATIENT)
Dept: OTOLARYNGOLOGY | Facility: CLINIC | Age: 78
End: 2023-04-17
Payer: MEDICARE

## 2023-04-17 VITALS
WEIGHT: 123 LBS | SYSTOLIC BLOOD PRESSURE: 112 MMHG | BODY MASS INDEX: 19.77 KG/M2 | HEIGHT: 66 IN | DIASTOLIC BLOOD PRESSURE: 70 MMHG

## 2023-04-17 PROCEDURE — 69210 REMOVE IMPACTED EAR WAX UNI: CPT

## 2023-04-17 PROCEDURE — 99213 OFFICE O/P EST LOW 20 MIN: CPT | Mod: 25

## 2023-04-17 NOTE — HISTORY OF PRESENT ILLNESS
[de-identified] : Pt presents with h/o myasthenia gravis, bilateral SNHL - wears bilateral amplification, watchman device, a-fib, loop recorder, sleep apnea and sudden left SNHL. s/p Left Trans-Tympanic Steroid Membrane Injection on 3/30/2020, 4/16/2020 and 5/12/2020 presents today for 6 month follow up on ears. Pt notes he has been doing good. Pt notes he saw the audiologist to update his hearing aids. Pt notes he is still having trouble with his left ear hearing aid where it cuts out often. Pt notes when he is chewing or eating he gets constant feedback. Pt notes he was having difficulty keeping the hearing aid in his ear. Pt notes he has an appointment with the audiologist.

## 2023-04-17 NOTE — ADDENDUM
[FreeTextEntry1] : Documented by Emily Montejo acting as scribe for Dr. Hi on 04/17/2023.\par \par All Medical record entries made by the scribe were at my, Dr. Hi,direction and personally dictated by me on 04/17/2023. I have reviewed the chart and agree that the record accurately reflects my personal performance of the history, physical exam, assessment and plan. I have also personally directed, reviewed, and agreed with the discharge instructions.

## 2023-04-17 NOTE — PHYSICAL EXAM
[[place] Test Lateralizes To Right] : tone lateralization to the right [Normal] : mucosa is normal [Midline] : trachea located in midline position [FreeTextEntry9] : cerumen impaction removed via suction, peroxide [de-identified] : deviated septum b/l -  along the floor right, midportion on the left [de-identified] : elongated uvula [FreeTextEntry2] : sinuses nontender to percussion.

## 2023-04-17 NOTE — CONSULT LETTER
"Date: 2020 09:29:09  Clinician: Modesta Almodovar  Clinician NPI: [id number]  Patient: Ishan Brown  Patient : 2016  Patient Address: [address] [place] [address] [address] [facility] [address], [place] [address]  Patient Phone: [phone]  Visit Protocol: URI  Patient Summary:  Ishan is a 4 year old ( : 2016 ) male who initiated a [facility] Visit for COVID-19 (Coronavirus) evaluation and screening.  The patient is a minor and has consent from a parent/guardian to receive medical care. The following medical history is provided by the patient's parent/guardian. When asked the question \"Please sign me up to receive news, health information and promotions. \", Ishan responded \"No\".    Ishan states his symptoms started 1-2 days ago.   His symptoms consist of rhinitis, a cough, and nasal congestion.   Symptom details     Nasal secretions: The color of his mucus is clear.    Cough: Ishan coughs a few times an hour and his cough is not more bothersome at night. Phlegm does not come into his throat when he coughs. He believes his cough is caused by post-nasal drip.      Ishan denies having vomiting, facial pain or pressure, myalgias, chills, malaise, sore throat, teeth pain, ageusia, diarrhea, ear pain, headache, wheezing, fever, nausea, and anosmia. He also denies taking antibiotic medication in the past month and having recent facial or sinus surgery in the past 60 days. He is not experiencing dyspnea.   Precipitating events  He has not recently been exposed to someone with influenza. Ishan has not been in close contact with any high risk individuals.   Pertinent COVID-19 (Coronavirus) information    Ishan has had a close contact with a laboratory-confirmed COVID-19 patient within 14 days of symptom onset. He was not exposed at his work. Date Ishan was exposed to the laboratory-confirmed COVID-19 patient: 2020   Additional information about contact with COVID-19 (Coronavirus) patient as " reported by the patient (free text): Covid positive parent in the home    Since December 2019, Ishan has been tested for COVID-19 and has not had upper respiratory infection or influenza-like illness.      Result of COVID-19 test: Negative     Date of his COVID-19 test: 09/10/2020      Pertinent medical history  Ishan needs a return to work/school note.   Weight: 35 lbs   Height: 3 ft 6 in  Weight: 35 lbs    MEDICATIONS: No current medications, ALLERGIES: NKDA  Clinician Response:  Dear Ishan,  Based on the information provided, you have a viral upper respiratory infection, otherwise known as a cold. Symptoms vary from person to person, but can include sneezing, coughing, a runny nose, sore throat, and headache and range from mild to severe.  Unfortunately, there are no medications that can cure a cold, so treatment is focused on controlling symptoms as much as possible. Most people gradually feel better until symptoms are gone in 1-2 weeks.  Medication information  Because you have a viral infection, antibiotics will not help you get better. Treating a viral infection with antibiotics could actually make you feel worse.  Self care  Steps you can take to be as comfortable as possible:     Rest.    Drink plenty of fluids.    Take a warm shower to loosen congestion    Use a cool-mist humidifier.    Take a spoonful of honey to reduce your cough.     When to seek care  Please be seen in a clinic or urgent care if any of the following occur:   New symptoms develop, or symptoms become worse   Call ahead before going to the clinic or urgent care.  Additional treatment plan   Your symptoms show that you may have coronavirus (COVID-19). This illness can cause fever, cough and trouble breathing. Many people get a mild case and get better on their own. Some people can get very sick.  Based on the symptoms you have shared, I would like you to be re-checked in 2 to 3 days. Please call your family clinic to set up a  "video or phone visit.  Will I be tested for COVID-19?  We would like to test you for this virus.   Please call [phone] to schedule your visit. Explain that you were referred by [facility] to have a COVID-19 test. Be ready to share your [facility] visit ID number.   * If you need to schedule in [facility] or [facility] please call [phone] or for [employer] employees please call [phone].    The following will serve as your written order for this COVID Test, ordered by me, for the indication of suspected COVID [Z20.828]: The test will be ordered in [employer], our electronic health record, after you are scheduled. It will show as ordered and authorized by Yoan Putnam MD.  Order: COVID-19 (Coronavirus) PCR for SYMPTOMATIC testing from [facility].   1.When it's time for your COVID test:   Stay at least 6 feet away from others. (If someone will drive you to your test, stay in the backseat, as far away from the  as you can.)   Cover your mouth and nose with a mask, tissue or washcloth.  Go straight to the testing site. Don't make any stops on the way there or back.      2.Starting now: Stay home and away from others (self-isolate) until:   You've had no fever---and no medicine that reduces fever---for one full day (24 hours). And...   Your other symptoms have gotten better. For example, your cough or breathing has improved. And...   At least 10 days have passed since your symptoms started.       During this time, don't leave the house except for testing or medical care.   Stay in your own room, even for meals. Use your own bathroom if you can.   Stay away from others in your home. No hugging, kissing or shaking hands. No visitors.  Don't go to work, school or anywhere else.    Clean \"high touch\" surfaces often (doorknobs, counters, handles, etc.). Use a household cleaning spray or wipes. You'll find a full list of  on the EPA website: " [Dear  ___] : Dear  [unfilled], [Courtesy Letter:] : I had the pleasure of seeing your patient, [unfilled], in my office today. www.epa.gov/pesticide-registration/list-n-disinfectants-use-against-sars-cov-2.   Cover your mouth and nose with a mask, tissue or washcloth to avoid spreading germs.  Wash your hands and face often. Use soap and water.  Caregivers in these groups are at risk for severe illness due to COVID-19:  o People 65 years and older  o People who live in a nursing home or long-term care facility  o People with chronic disease (lung, heart, cancer, diabetes, kidney, liver, immunologic)   o People who have a weakened immune system, including those who:   Are in cancer treatment  Take medicine that weakens the immune system, such as corticosteroids  Had a bone marrow or organ transplant  Have an immune deficiency  Have poorly controlled HIV or AIDS  Are obese (body mass index of 40 or higher)  Smoke regularly   o Caregivers should wear gloves while washing dishes, handling laundry and cleaning bedrooms and bathrooms.  o Use caution when washing and drying laundry: Don't shake dirty laundry, and use the warmest water setting that you can.  o For more tips, go to www.cdc.gov/coronavirus/2019-ncov/downloads/10Things.pdf.      How can I take care of myself?   Get lots of rest. Drink extra fluids (unless a doctor has told you not to)   Take Tylenol (acetaminophen) for fever or pain. If you have liver or kidney problems, ask your family doctor if it's okay to take Tylenol.   Adults can take either:    650 mg (two 325 mg pills) every 4 to 6 hours, or...   1,000 mg (two 500 mg pills) every 8 hours as needed.    Note: Don't take more than 3,000 mg in one day. Acetaminophen is found in many medicines (both prescribed and over-the-counter medicines). Read all labels to be sure you don't take too much.   For children, check the Tylenol bottle for the right dose. The dose is based on the child's age or weight.    If you have other health problems (like cancer, heart failure, an organ transplant or severe kidney disease): Call your specialty  [Please see my note below.] : Please see my note below. clinic if you don't feel better in the next 2 days.       Know when to call 911. Emergency warning signs include:    Trouble breathing or shortness of breath Pain or pressure in the chest that doesn't go away Feeling confused like you haven't felt before, or not being able to wake up Bluish-colored lips or face  Where can I get more information?   [facility] -- About COVID-19: [email or web address]   CDC -- What to Do If You're Sick: www.cdc.gov/coronavirus/2019-ncov/about/steps-when-sick.html   CDC -- Ending Home Isolation: www.cdc.gov/coronavirus/2019-ncov/hcp/disposition-in-home-patients.html   CDC -- Caring for Someone: www.cdc.gov/coronavirus/2019-ncov/if-you-are-sick/care-for-someone.html   [facility] -- Interim Guidance for Hospital Discharge to Home: www.health.[facility].mn./diseases/coronavirus/hcp/hospdischarge.pdf   [facility] clinical trials (COVID-19 research studies): clinicalaffairs.[facility]-clinical-trials    Below are the COVID-19 hotlines at the [facility] ([facility]). Interpreters are available.    For health questions: Call 537-867-1384 or 1-389.737.7177 (7 a.m. to 7 p.m.) For questions about schools and childcare: Call 766-202-3247 or 1-204.456.7309 (7 a.m. to 7 p.m.)       Diagnosis: Contact with and (suspected) exposure to other viral communicable diseases  Diagnosis ICD: Z20.828   [Consult Closing:] : Thank you very much for allowing me to participate in the care of this patient.  If you have any questions, please do not hesitate to contact me. [Sincerely,] : Sincerely, [FreeTextEntry3] : Gennaro Hi MD FACS

## 2023-04-17 NOTE — ASSESSMENT
[FreeTextEntry1] : Reviewed and reconciled medications, allergies, PMHx, PSHx, SocHx, FMHx.\par \par Pt presents with h/o myasthenia gravis, bilateral SNHL - wears bilateral amplification, watchman device, a-fib, loop recorder, sleep apnea and sudden left SNHL. s/p Left Trans-Tympanic Steroid Membrane Injection on 3/30/2020, 4/16/2020 and 5/12/2020 presents today for 6 month follow up on ears. \par \par Physical Exam -\par right ear - clean and clear\par left ear - cerumen impaction removed\par deviated septum b/l -  along the floor right, midportion on the left\par elongated uvula\par \par Carotid Doppler 10/16/22 - small plaques both sides of the carotid arteries, not affecting blood flow\par \par Plan: \par Carotid Doppler results discussed. Keep left hearing aid out for an hour. Discuss with audiologist how to keep left hearing aid in without pushing wax in. FU 3 months.

## 2023-06-01 ENCOUNTER — APPOINTMENT (OUTPATIENT)
Dept: PHARMACY | Facility: CLINIC | Age: 78
End: 2023-06-01
Payer: SELF-PAY

## 2023-06-01 PROCEDURE — V5299A: CUSTOM | Mod: NC

## 2023-06-19 ENCOUNTER — OFFICE (OUTPATIENT)
Dept: URBAN - METROPOLITAN AREA CLINIC 109 | Facility: CLINIC | Age: 78
Setting detail: OPHTHALMOLOGY
End: 2023-06-19
Payer: MEDICARE

## 2023-06-19 VITALS — HEIGHT: 60 IN

## 2023-06-19 DIAGNOSIS — H40.043: ICD-10-CM

## 2023-06-19 DIAGNOSIS — H26.492: ICD-10-CM

## 2023-06-19 DIAGNOSIS — B00.52: ICD-10-CM

## 2023-06-19 PROCEDURE — 92014 COMPRE OPH EXAM EST PT 1/>: CPT | Performed by: OPHTHALMOLOGY

## 2023-06-19 ASSESSMENT — PACHYMETRY
OS_CT_CORRECTION: -1
OS_CT_UM: 567
OD_CT_CORRECTION: -1
OD_CT_UM: 552

## 2023-06-19 ASSESSMENT — REFRACTION_AUTOREFRACTION
OD_CYLINDER: -1.00
OS_SPHERE: +1.50
OS_CYLINDER: -1.00
OS_AXIS: 88
OD_AXIS: 92
OD_SPHERE: +0.75

## 2023-06-19 ASSESSMENT — REFRACTION_MANIFEST
OD_SPHERE: -0.50
OD_ADD: +2.50
OS_ADD: +2.50
OS_VA1: 20/20-
OD_VA1: 20/20-
OS_SPHERE: +0.50

## 2023-06-19 ASSESSMENT — TONOMETRY
OD_IOP_MMHG: 18
OS_IOP_MMHG: 18

## 2023-06-19 ASSESSMENT — VISUAL ACUITY
OD_BCVA: 20/25-1
OS_BCVA: 20/25-2

## 2023-06-19 ASSESSMENT — SPHEQUIV_DERIVED
OD_SPHEQUIV: 0.25
OS_SPHEQUIV: 1

## 2023-06-19 ASSESSMENT — CONFRONTATIONAL VISUAL FIELD TEST (CVF)
OD_FINDINGS: FULL
OS_FINDINGS: FULL

## 2023-06-22 ENCOUNTER — APPOINTMENT (OUTPATIENT)
Dept: PHARMACY | Facility: CLINIC | Age: 78
End: 2023-06-22
Payer: SELF-PAY

## 2023-06-22 PROCEDURE — V5299A: CUSTOM | Mod: NC

## 2023-07-17 ENCOUNTER — APPOINTMENT (OUTPATIENT)
Dept: OTOLARYNGOLOGY | Facility: CLINIC | Age: 78
End: 2023-07-17
Payer: MEDICARE

## 2023-07-17 VITALS
DIASTOLIC BLOOD PRESSURE: 68 MMHG | SYSTOLIC BLOOD PRESSURE: 119 MMHG | HEART RATE: 67 BPM | BODY MASS INDEX: 20.41 KG/M2 | HEIGHT: 66 IN | WEIGHT: 127 LBS

## 2023-07-17 DIAGNOSIS — H61.22 IMPACTED CERUMEN, LEFT EAR: ICD-10-CM

## 2023-07-17 DIAGNOSIS — H61.812 EXOSTOSIS OF LEFT EXTERNAL CANAL: ICD-10-CM

## 2023-07-17 PROCEDURE — 99213 OFFICE O/P EST LOW 20 MIN: CPT | Mod: 25

## 2023-07-17 PROCEDURE — 69210 REMOVE IMPACTED EAR WAX UNI: CPT

## 2023-07-17 NOTE — PHYSICAL EXAM
[] : septum deviated bilaterally [Midline] : trachea located in midline position [Normal] : no neck adenopathy [FreeTextEntry9] : sofia exostosis, cerumen impaction removed via curettage, peroxide, suction, irrigation [FreeTextEntry5] : no response to tuning forks [de-identified] : allergic inflamed turbinates  [de-identified] : elongated uvula [FreeTextEntry2] : sinuses nontender to percussion.

## 2023-07-17 NOTE — ADDENDUM
[FreeTextEntry1] : Documented by Emily Montejo acting as scribe for Dr. Hi on 07/17/2023.\par \par All Medical record entries made by the scribe were at my, Dr. Hi,direction and personally dictated by me on 07/17/2023. I have reviewed the chart and agree that the record accurately reflects my personal performance of the history, physical exam, assessment and plan. I have also personally directed, reviewed, and agreed with the discharge instructions.

## 2023-07-17 NOTE — ASSESSMENT
[FreeTextEntry1] : Reviewed and reconciled medications, allergies, PMHx, PSHx, SocHx, FMHx.\par \par Pt presents with h/o myasthenia gravis, bilateral SNHL - wears bilateral amplification, watchman device, a-fib, loop recorder, sleep apnea and sudden left SNHL. s/p Left Trans-Tympanic Steroid Membrane Injection on 3/30/2020, 4/16/2020 and 5/12/2020 presents today for 3 month follow up on left ear. \par \par Physical Exam -\par left ear: sofia exostosis, cerumen impaction removed via curettage, suction, peroxide, irrigation\par allergic inflamed turbinates \par deviated septum \par \par Plan: \par Cerumen removed AS. Discussed r/b/a of left ear surgery for sofia exostosis - not recommended due to age. Wait an hour before putting left hearing aid in. Continue working with audiologist. FU 3 months.

## 2023-07-17 NOTE — HISTORY OF PRESENT ILLNESS
[de-identified] : Pt presents with h/o myasthenia gravis, bilateral SNHL - wears bilateral amplification, watchman device, a-fib, loop recorder, sleep apnea and sudden left SNHL. s/p Left Trans-Tympanic Steroid Membrane Injection on 3/30/2020, 4/16/2020 and 5/12/2020 presents today c/o left ear clogging that comes and goes. Notes he is working with the audiologist to better fit left hearing aid to stop from pushing wax into ear canal. Notes pt has been getting head aches when he gets reflux symptoms.

## 2023-07-24 ENCOUNTER — NON-APPOINTMENT (OUTPATIENT)
Age: 78
End: 2023-07-24

## 2023-07-25 ENCOUNTER — APPOINTMENT (OUTPATIENT)
Dept: SURGERY | Facility: CLINIC | Age: 78
End: 2023-07-25
Payer: MEDICARE

## 2023-07-25 VITALS
HEART RATE: 65 BPM | BODY MASS INDEX: 20.41 KG/M2 | HEIGHT: 66 IN | OXYGEN SATURATION: 100 % | TEMPERATURE: 97.6 F | WEIGHT: 127 LBS | DIASTOLIC BLOOD PRESSURE: 78 MMHG | SYSTOLIC BLOOD PRESSURE: 131 MMHG

## 2023-07-25 DIAGNOSIS — K40.30 UNILATERAL INGUINAL HERNIA, WITH OBSTRUCTION, W/OUT GANGRENE, NOT SPECIFIED AS RECURRENT: ICD-10-CM

## 2023-07-25 DIAGNOSIS — S76.211A STRAIN OF ADDUCTOR MUSCLE, FASCIA AND TENDON OF RIGHT THIGH, INITIAL ENCOUNTER: ICD-10-CM

## 2023-07-25 PROCEDURE — 99214 OFFICE O/P EST MOD 30 MIN: CPT

## 2023-07-25 NOTE — ED PROVIDER NOTE - CHIEF COMPLAINT
The patient is a 72y Male complaining of back pain/injury. 1 Principal Discharge DX:	Neck pain  Secondary Diagnosis:	Migraine headache

## 2023-07-25 NOTE — ASSESSMENT
[FreeTextEntry1] : Hx of esophageal ulcers, peptic stricture vs Schatzki ring (prior dilation), Hiatal hernia.\par Presents for consideration for antireflux procedure and hiatal hernia repair.\par Hx of Myasthenia Gravis.\par Seen previously by several GI's and Surgeons at [facility].\par \par Further evaluation and testing required prior to consideration for surgical interventions including but not limited to Esophageal manometry and pH monitoring (to be completed off PPI's) as Hiatal hernia appears to be small and may not be the source/cause of his symptoms.  Given hx of Myasthenia I'm suspicious of esophageal motility disorder and or chronic stricture as contributing factors for which fundoplication may not be appropriate solution.\par \par Complex case to be referred to Thoracic surgery for further workup and management given extent of esophageal disease.  Discussed with Dr Juni Evangelista who agree to see the patient in consultation and arrange further workup including Manometry and pH testing.\par \par Contact information provided to patient and his wife.  Time spent reviewing previous history and endoscopy reports.  Discussed these findings and concerns.  All questions answered to their satisfaction.\par \par They will call Dr Evangelista's office to schedule subsequent evaluation.

## 2023-07-25 NOTE — HISTORY OF PRESENT ILLNESS
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  Treated with PPI's and Sucralfate\maximiliano Has consulted with multiple Gastroenterologists and surgeons at [facility] and [facility].

## 2023-07-25 NOTE — REVIEW OF SYSTEMS
[Loss Of Hearing] : hearing loss [As Noted in HPI] : as noted in HPI [Heartburn] : heartburn [Negative] : Heme/Lymph [FreeTextEntry9] : kyphosis

## 2023-07-25 NOTE — PHYSICAL EXAM
[Normal Breath Sounds] : Normal breath sounds [Normal Heart Sounds] : normal heart sounds [No Rash or Lesion] : No rash or lesion [Alert] : alert [Oriented to Person] : oriented to person [Oriented to Place] : oriented to place [Oriented to Time] : oriented to time [Calm] : calm [de-identified] : Elderly gentleman in no acute distress [de-identified] : AMINA UNDERWOOD EOMI [de-identified] : Soft, nontender nondistended, positive bowel sounds in all four quads.  No hernia or masses. No rebound or guarding.\par  [de-identified] : Ambulating wihtout difficulty or assistance.  Kyphosis

## 2023-08-17 ENCOUNTER — APPOINTMENT (OUTPATIENT)
Dept: THORACIC SURGERY | Facility: CLINIC | Age: 78
End: 2023-08-17
Payer: MEDICARE

## 2023-08-17 ENCOUNTER — RESULT REVIEW (OUTPATIENT)
Age: 78
End: 2023-08-17

## 2023-08-17 VITALS
HEIGHT: 66 IN | RESPIRATION RATE: 18 BRPM | OXYGEN SATURATION: 99 % | SYSTOLIC BLOOD PRESSURE: 138 MMHG | DIASTOLIC BLOOD PRESSURE: 73 MMHG | BODY MASS INDEX: 20.73 KG/M2 | HEART RATE: 66 BPM | WEIGHT: 129 LBS | TEMPERATURE: 98 F

## 2023-08-17 DIAGNOSIS — Z87.19 PERSONAL HISTORY OF OTHER DISEASES OF THE DIGESTIVE SYSTEM: ICD-10-CM

## 2023-08-17 PROCEDURE — 99204 OFFICE O/P NEW MOD 45 MIN: CPT

## 2023-08-17 NOTE — REVIEW OF SYSTEMS
[Feeling Tired] : feeling tired [Recent Weight Loss (___ Lbs)] : recent [unfilled] ~Ulb weight loss [Abdominal Pain] : abdominal pain [Vomiting] : vomiting [Diarrhea] : diarrhea [Negative] : Heme/Lymph

## 2023-08-18 RX ORDER — CHOLECALCIFEROL (VITAMIN D3) 25 MCG
TABLET ORAL
Refills: 0 | Status: ACTIVE | COMMUNITY

## 2023-08-18 RX ORDER — DENOSUMAB 60 MG/ML
60 INJECTION SUBCUTANEOUS
Refills: 0 | Status: ACTIVE | COMMUNITY

## 2023-08-18 RX ORDER — BUTYROSPERMUM PARKII(SHEA BUTTER), SIMMONDSIA CHINENSIS (JOJOBA) SEED OIL, ALOE BARBADENSIS LEAF EXTRACT .01; 1; 3.5 G/100G; G/100G; G/100G
100 LIQUID TOPICAL
Refills: 0 | Status: ACTIVE | COMMUNITY

## 2023-08-18 RX ORDER — VITAMIN B COMPLEX
CAPSULE ORAL
Refills: 0 | Status: ACTIVE | COMMUNITY

## 2023-08-18 RX ORDER — EFINACONAZOLE 100 MG/ML
SOLUTION TOPICAL
Refills: 0 | Status: ACTIVE | COMMUNITY

## 2023-08-18 NOTE — PHYSICAL EXAM
[General Appearance - Alert] : alert [General Appearance - In No Acute Distress] : in no acute distress [Sclera] : the sclera and conjunctiva were normal [PERRL With Normal Accommodation] : pupils were equal in size, round, and reactive to light [Outer Ear] : the ears and nose were normal in appearance [Neck Appearance] : the appearance of the neck was normal [Neck Cervical Mass (___cm)] : no neck mass was observed [Jugular Venous Distention Increased] : there was no jugular-venous distention [] : no respiratory distress [Respiration, Rhythm And Depth] : normal respiratory rhythm and effort [Auscultation Breath Sounds / Voice Sounds] : lungs were clear to auscultation bilaterally [Heart Rate And Rhythm] : heart rate was normal and rhythm regular [Heart Sounds] : normal S1 and S2 [2+] : left 2+ [Bowel Sounds] : normal bowel sounds [Abdomen Soft] : soft [Cervical Lymph Nodes Enlarged Posterior Bilaterally] : posterior cervical [Cervical Lymph Nodes Enlarged Anterior Bilaterally] : anterior cervical [Supraclavicular Lymph Nodes Enlarged Bilaterally] : supraclavicular [Abnormal Walk] : normal gait [Skin Color & Pigmentation] : normal skin color and pigmentation [Skin Turgor] : normal skin turgor [No Focal Deficits] : no focal deficits [Oriented To Time, Place, And Person] : oriented to person, place, and time [Impaired Insight] : insight and judgment were intact [Affect] : the affect was normal [Mood] : the mood was normal [Right Carotid Bruit] : no bruit heard over the right carotid [Left Carotid Bruit] : no bruit heard over the left carotid [FreeTextEntry2] : mild edema [FreeTextEntry1] : mildly distended

## 2023-08-18 NOTE — HISTORY OF PRESENT ILLNESS
[FreeTextEntry1] : Mr. SUTTON is a 78 year old male referred by   who presents for consultation regarding hiatal hernia and gastroesophageal reflux disease. His past medical history includes esophageal dysmotility (and stricture vs Schatzki ring s/p esophageal dilation x 3 due to hiccups, bubble and vomiting - Minesh Solares [place]), GERD, Myasthenia Gravis.  He was evaluated by multiple GI doctors at [facility] and [facility] for GI disturbances. Recent shingles and repeated urinary tract infection  Today he reports having an endoscopy last year which diagnosed a sliding hiatal hernia with intermittent diarrhea (he sees a functional medicine doctor and is on supplements). May 17th he developed more severe acid reflux symptoms. He gets severe burning of the chest and severe migraines. He can feel the pressure under his ribs with eating which he feels is due to the hernia. The burning sensation is mildly better since starting Sucralfate. He does require Tums.   The patient further describes the extent of his previous workups and evaluations for his chronic symptoms. He has been on several elimination diets, has had stool testing for malabsorption, bacterial overgrowth, gastrin and VIP leves and has seen a functional medicine doctor who has prescribed several supplements and recommended apple cider vinegar + honey before eating, which he feels does improve his symptoms. Other workup has not been diagnostic.   The patient is accompanied by his wife of 42 years, Maurilio, who assists with history taking.   Gastro: Minesh Solares

## 2023-08-18 NOTE — DATA REVIEWED
[FreeTextEntry1] : Notes from Dr Butterfield from 7/25/23 Further evaluation and testing required prior to consideration for surgical interventions including but not limited to Esophageal manometry and pH monitoring (to be completed off PPI's) as Hiatal hernia appears to be small and may not be the source/cause of his symptoms. Given hx of Myasthenia I'm suspicious of esophageal motility disorder and or chronic stricture as contributing factors for which fundoplication may not be appropriate solution.

## 2023-08-18 NOTE — ASSESSMENT
[FreeTextEntry1] : I had the pleasure of seeing Mr. SUTTON in the office today to discuss hiatal hernia and gastroesophageal reflux disease.  Briefly, this is a 78 year old male with a history of myasthenia gravis, hiatal hernia and esophageal dysmotility with esophageal stricture as well as diarrhea. He has developed fairly severe symptoms of the acid reflux however, he also has some strictures which has been addressed in the past. His last esophageal dilatation was in January 2023 which was done due to vomiting.  He has made lifestyle and diet changes to address the acid reflux and remains on medication to improve symptoms but he does have significant breakthrough symptoms. He has already had esophageal ulcers and strictures which may be a result of ongoing acid reflex.  At this point, we have to prove that there are no other complicating issues, such as achalasia or malignancy. He also has myasthenia gravis and esophageal motility will require further evaluation. I would like to do some imaging and testing to further decipher the disease process.   I have fully reviewed and evaluated all available results. All questions were answered and concerns were addressed.   Plan: - Endoscopy with possible biopsy and/or dilatation - CT Chest, abdomen and pelvis with oral contrast - Bravo (pH test) and manometry with Dr. Gupta - Return to care to discuss surgical plan - Patient is in full understanding and agreement with the plan going forward.   I, Dr. Juni Meyers, personally performed the evaluation and management (E/M) services for this new patient.  That E/M includes conducting the initial examination, assessing all conditions, and establishing the plan of care.  Today, Tank Rea PA-C, was here to observe my evaluation and management services for this patient to be followed going forward.

## 2023-08-18 NOTE — CONSULT LETTER
[Dear  ___] : Dear  [unfilled], [Courtesy Letter:] : I had the pleasure of seeing your patient, [unfilled], in my office today. [Please see my note below.] : Please see my note below. [Consult Closing:] : Thank you very much for allowing me to participate in the care of this patient.  If you have any questions, please do not hesitate to contact me. [Sincerely,] : Sincerely, [FreeTextEntry2] : Jayce Butterfield MD [FreeTextEntry3] : Juni Evangelista MD Director of Thoracic Surgery, [place]  Cardiovascular & Thoracic Surgery [facility]  [DrHelena  ___] : Dr. MUNOZ

## 2023-08-26 ENCOUNTER — APPOINTMENT (OUTPATIENT)
Dept: CT IMAGING | Facility: CLINIC | Age: 78
End: 2023-08-26
Payer: MEDICARE

## 2023-08-26 PROCEDURE — 71250 CT THORAX DX C-: CPT

## 2023-08-26 PROCEDURE — 74176 CT ABD & PELVIS W/O CONTRAST: CPT

## 2023-09-05 ENCOUNTER — APPOINTMENT (OUTPATIENT)
Dept: PHARMACY | Facility: CLINIC | Age: 78
End: 2023-09-05
Payer: SELF-PAY

## 2023-09-05 PROCEDURE — V5299A: CUSTOM

## 2023-09-19 ENCOUNTER — TRANSCRIPTION ENCOUNTER (OUTPATIENT)
Age: 78
End: 2023-09-19

## 2023-09-20 ENCOUNTER — TRANSCRIPTION ENCOUNTER (OUTPATIENT)
Age: 78
End: 2023-09-20

## 2023-09-20 ENCOUNTER — APPOINTMENT (OUTPATIENT)
Dept: CARDIOTHORACIC SURGERY | Facility: HOSPITAL | Age: 78
End: 2023-09-20
Payer: MEDICARE

## 2023-09-20 ENCOUNTER — RESULT REVIEW (OUTPATIENT)
Age: 78
End: 2023-09-20

## 2023-09-20 ENCOUNTER — OUTPATIENT (OUTPATIENT)
Dept: OUTPATIENT SERVICES | Facility: HOSPITAL | Age: 78
LOS: 1 days | Discharge: HOME | End: 2023-09-20
Payer: MEDICARE

## 2023-09-20 DIAGNOSIS — Z98.890 OTHER SPECIFIED POSTPROCEDURAL STATES: Chronic | ICD-10-CM

## 2023-09-20 DIAGNOSIS — H26.8 OTHER SPECIFIED CATARACT: Chronic | ICD-10-CM

## 2023-09-20 DIAGNOSIS — K21.9 GASTRO-ESOPHAGEAL REFLUX DISEASE WITHOUT ESOPHAGITIS: ICD-10-CM

## 2023-09-20 DIAGNOSIS — Z95.818 PRESENCE OF OTHER CARDIAC IMPLANTS AND GRAFTS: Chronic | ICD-10-CM

## 2023-09-20 DIAGNOSIS — Z95.828 PRESENCE OF OTHER VASCULAR IMPLANTS AND GRAFTS: Chronic | ICD-10-CM

## 2023-09-20 PROCEDURE — 88305 TISSUE EXAM BY PATHOLOGIST: CPT | Mod: 26

## 2023-09-20 PROCEDURE — 88342 IMHCHEM/IMCYTCHM 1ST ANTB: CPT | Mod: 26

## 2023-09-20 PROCEDURE — 88305 TISSUE EXAM BY PATHOLOGIST: CPT

## 2023-09-20 PROCEDURE — ZZZZZ: CPT

## 2023-09-20 PROCEDURE — 43249 ESOPH EGD DILATION <30 MM: CPT

## 2023-09-20 PROCEDURE — C1726: CPT

## 2023-09-20 PROCEDURE — 88342 IMHCHEM/IMCYTCHM 1ST ANTB: CPT

## 2023-09-20 PROCEDURE — 43220 ESOPHAGOSCOPY BALLOON <30MM: CPT

## 2023-09-20 PROCEDURE — 43239 EGD BIOPSY SINGLE/MULTIPLE: CPT | Mod: XS

## 2023-09-20 DEVICE — CATH BLLN CRE .035INX7.7FR 12-13.5-15MM: Type: IMPLANTABLE DEVICE | Status: FUNCTIONAL

## 2023-09-20 NOTE — BRIEF OPERATIVE NOTE - NSICDXBRIEFPROCEDURE_GEN_ALL_CORE_FT
PROCEDURES:  EGD, with balloon dilation and biopsy 20-Sep-2023 09:17:01 biopsy of antrum, GE junction, mid esophagus Blank Ramirez

## 2023-09-27 LAB — SURGICAL PATHOLOGY STUDY: SIGNIFICANT CHANGE UP

## 2023-10-05 ENCOUNTER — TRANSCRIPTION ENCOUNTER (OUTPATIENT)
Age: 78
End: 2023-10-05

## 2023-10-05 ENCOUNTER — APPOINTMENT (OUTPATIENT)
Dept: THORACIC SURGERY | Facility: CLINIC | Age: 78
End: 2023-10-05
Payer: MEDICARE

## 2023-10-05 VITALS
TEMPERATURE: 98.2 F | BODY MASS INDEX: 20.73 KG/M2 | RESPIRATION RATE: 16 BRPM | WEIGHT: 129 LBS | HEART RATE: 76 BPM | DIASTOLIC BLOOD PRESSURE: 77 MMHG | HEIGHT: 66 IN | OXYGEN SATURATION: 100 % | SYSTOLIC BLOOD PRESSURE: 131 MMHG

## 2023-10-05 DIAGNOSIS — Z87.19 PERSONAL HISTORY OF OTHER DISEASES OF THE DIGESTIVE SYSTEM: ICD-10-CM

## 2023-10-05 PROCEDURE — 99213 OFFICE O/P EST LOW 20 MIN: CPT

## 2023-10-05 RX ORDER — SUCRALFATE 1 G/1
1 TABLET ORAL
Refills: 0 | Status: COMPLETED | COMMUNITY
End: 2023-10-05

## 2023-10-05 RX ORDER — GLYCOPYRROLATE 1 MG/1
1 TABLET ORAL
Qty: 150 | Refills: 0 | Status: COMPLETED | COMMUNITY
Start: 2022-09-15 | End: 2023-10-05

## 2023-10-06 ENCOUNTER — OUTPATIENT (OUTPATIENT)
Dept: OUTPATIENT SERVICES | Facility: HOSPITAL | Age: 78
LOS: 1 days | Discharge: HOME | End: 2023-10-06
Payer: MEDICARE

## 2023-10-06 DIAGNOSIS — Z98.890 OTHER SPECIFIED POSTPROCEDURAL STATES: Chronic | ICD-10-CM

## 2023-10-06 DIAGNOSIS — Z95.828 PRESENCE OF OTHER VASCULAR IMPLANTS AND GRAFTS: Chronic | ICD-10-CM

## 2023-10-06 DIAGNOSIS — K21.9 GASTRO-ESOPHAGEAL REFLUX DISEASE WITHOUT ESOPHAGITIS: ICD-10-CM

## 2023-10-06 DIAGNOSIS — H26.8 OTHER SPECIFIED CATARACT: Chronic | ICD-10-CM

## 2023-10-06 DIAGNOSIS — Z95.818 PRESENCE OF OTHER CARDIAC IMPLANTS AND GRAFTS: Chronic | ICD-10-CM

## 2023-10-10 PROCEDURE — 91038 ESOPH IMPED FUNCT TEST > 1HR: CPT

## 2023-10-10 PROCEDURE — 91010 ESOPHAGUS MOTILITY STUDY: CPT

## 2023-10-16 ENCOUNTER — APPOINTMENT (OUTPATIENT)
Dept: OTOLARYNGOLOGY | Facility: CLINIC | Age: 78
End: 2023-10-16
Payer: MEDICARE

## 2023-10-16 VITALS
BODY MASS INDEX: 20.41 KG/M2 | HEIGHT: 66 IN | SYSTOLIC BLOOD PRESSURE: 129 MMHG | DIASTOLIC BLOOD PRESSURE: 70 MMHG | WEIGHT: 127 LBS | HEART RATE: 62 BPM

## 2023-10-16 DIAGNOSIS — H90.3 SENSORINEURAL HEARING LOSS, BILATERAL: ICD-10-CM

## 2023-10-16 DIAGNOSIS — H91.22 SUDDEN IDIOPATHIC HEARING LOSS, LEFT EAR: ICD-10-CM

## 2023-10-16 PROCEDURE — 99213 OFFICE O/P EST LOW 20 MIN: CPT

## 2023-10-16 PROCEDURE — 92567 TYMPANOMETRY: CPT

## 2023-10-16 PROCEDURE — 92557 COMPREHENSIVE HEARING TEST: CPT

## 2023-10-19 ENCOUNTER — APPOINTMENT (OUTPATIENT)
Dept: THORACIC SURGERY | Facility: CLINIC | Age: 78
End: 2023-10-19
Payer: MEDICARE

## 2023-10-19 VITALS
RESPIRATION RATE: 16 BRPM | SYSTOLIC BLOOD PRESSURE: 136 MMHG | HEART RATE: 79 BPM | BODY MASS INDEX: 20.98 KG/M2 | DIASTOLIC BLOOD PRESSURE: 75 MMHG | WEIGHT: 130 LBS | TEMPERATURE: 98.7 F | OXYGEN SATURATION: 99 %

## 2023-10-19 PROCEDURE — 99214 OFFICE O/P EST MOD 30 MIN: CPT

## 2023-11-02 ENCOUNTER — RESULT REVIEW (OUTPATIENT)
Age: 78
End: 2023-11-02

## 2023-11-02 ENCOUNTER — OUTPATIENT (OUTPATIENT)
Dept: OUTPATIENT SERVICES | Facility: HOSPITAL | Age: 78
LOS: 1 days | End: 2023-11-02
Payer: MEDICARE

## 2023-11-02 DIAGNOSIS — Z98.890 OTHER SPECIFIED POSTPROCEDURAL STATES: Chronic | ICD-10-CM

## 2023-11-02 DIAGNOSIS — H26.8 OTHER SPECIFIED CATARACT: Chronic | ICD-10-CM

## 2023-11-02 DIAGNOSIS — K22.2 ESOPHAGEAL OBSTRUCTION: ICD-10-CM

## 2023-11-02 DIAGNOSIS — Z95.828 PRESENCE OF OTHER VASCULAR IMPLANTS AND GRAFTS: Chronic | ICD-10-CM

## 2023-11-02 DIAGNOSIS — Z95.818 PRESENCE OF OTHER CARDIAC IMPLANTS AND GRAFTS: Chronic | ICD-10-CM

## 2023-11-02 PROCEDURE — 74220 X-RAY XM ESOPHAGUS 1CNTRST: CPT | Mod: 26

## 2023-11-02 PROCEDURE — 74220 X-RAY XM ESOPHAGUS 1CNTRST: CPT

## 2023-11-08 ENCOUNTER — OFFICE (OUTPATIENT)
Dept: URBAN - METROPOLITAN AREA CLINIC 109 | Facility: CLINIC | Age: 78
Setting detail: OPHTHALMOLOGY
End: 2023-11-08
Payer: MEDICARE

## 2023-11-08 VITALS — HEIGHT: 60 IN

## 2023-11-08 DIAGNOSIS — H26.492: ICD-10-CM

## 2023-11-08 DIAGNOSIS — H40.043: ICD-10-CM

## 2023-11-08 DIAGNOSIS — B00.52: ICD-10-CM

## 2023-11-08 PROCEDURE — 92133 CPTRZD OPH DX IMG PST SGM ON: CPT | Performed by: OPHTHALMOLOGY

## 2023-11-08 PROCEDURE — 99213 OFFICE O/P EST LOW 20 MIN: CPT | Performed by: OPHTHALMOLOGY

## 2023-11-08 ASSESSMENT — REFRACTION_AUTOREFRACTION
OD_AXIS: 092
OS_AXIS: 082
OS_SPHERE: +2.00
OD_CYLINDER: -1.75
OS_CYLINDER: -1.50
OD_SPHERE: +1.25

## 2023-11-08 ASSESSMENT — SPHEQUIV_DERIVED
OD_SPHEQUIV: 0.375
OS_SPHEQUIV: 1.25

## 2023-11-08 ASSESSMENT — CONFRONTATIONAL VISUAL FIELD TEST (CVF)
OS_FINDINGS: FULL
OD_FINDINGS: FULL

## 2023-11-08 ASSESSMENT — REFRACTION_MANIFEST
OS_SPHERE: +0.50
OS_ADD: +2.50
OD_ADD: +2.50
OS_VA1: 20/20-
OD_VA1: 20/20-
OD_SPHERE: -0.50

## 2023-11-16 ENCOUNTER — APPOINTMENT (OUTPATIENT)
Dept: THORACIC SURGERY | Facility: CLINIC | Age: 78
End: 2023-11-16
Payer: MEDICARE

## 2023-11-16 VITALS
DIASTOLIC BLOOD PRESSURE: 80 MMHG | SYSTOLIC BLOOD PRESSURE: 143 MMHG | OXYGEN SATURATION: 96 % | HEIGHT: 66 IN | WEIGHT: 130 LBS | BODY MASS INDEX: 20.89 KG/M2 | HEART RATE: 84 BPM | RESPIRATION RATE: 16 BRPM | TEMPERATURE: 98.4 F

## 2023-11-16 PROCEDURE — 99214 OFFICE O/P EST MOD 30 MIN: CPT

## 2023-11-16 RX ORDER — PRASTERONE (DHEA) 25 MG
25 CAPSULE ORAL
Refills: 0 | Status: COMPLETED | COMMUNITY
End: 2023-11-16

## 2023-12-14 ENCOUNTER — APPOINTMENT (OUTPATIENT)
Dept: PHARMACY | Facility: CLINIC | Age: 78
End: 2023-12-14
Payer: MEDICARE

## 2023-12-14 PROCEDURE — V5299A: CUSTOM

## 2023-12-19 ENCOUNTER — TRANSCRIPTION ENCOUNTER (OUTPATIENT)
Age: 78
End: 2023-12-19

## 2023-12-20 ENCOUNTER — RESULT REVIEW (OUTPATIENT)
Age: 78
End: 2023-12-20

## 2023-12-20 ENCOUNTER — APPOINTMENT (OUTPATIENT)
Dept: THORACIC SURGERY | Facility: HOSPITAL | Age: 78
End: 2023-12-20

## 2023-12-20 ENCOUNTER — OUTPATIENT (OUTPATIENT)
Dept: OUTPATIENT SERVICES | Facility: HOSPITAL | Age: 78
LOS: 1 days | Discharge: HOME | End: 2023-12-20
Payer: MEDICARE

## 2023-12-20 DIAGNOSIS — Z98.890 OTHER SPECIFIED POSTPROCEDURAL STATES: Chronic | ICD-10-CM

## 2023-12-20 DIAGNOSIS — Z95.818 PRESENCE OF OTHER CARDIAC IMPLANTS AND GRAFTS: Chronic | ICD-10-CM

## 2023-12-20 DIAGNOSIS — K21.9 GASTRO-ESOPHAGEAL REFLUX DISEASE WITHOUT ESOPHAGITIS: ICD-10-CM

## 2023-12-20 DIAGNOSIS — Z95.828 PRESENCE OF OTHER VASCULAR IMPLANTS AND GRAFTS: Chronic | ICD-10-CM

## 2023-12-20 DIAGNOSIS — H26.8 OTHER SPECIFIED CATARACT: Chronic | ICD-10-CM

## 2023-12-20 PROCEDURE — 43249 ESOPH EGD DILATION <30 MM: CPT

## 2023-12-20 PROCEDURE — 88312 SPECIAL STAINS GROUP 1: CPT | Mod: 26

## 2023-12-20 PROCEDURE — C1726: CPT

## 2023-12-20 PROCEDURE — 88342 IMHCHEM/IMCYTCHM 1ST ANTB: CPT | Mod: 26

## 2023-12-20 PROCEDURE — 43239 EGD BIOPSY SINGLE/MULTIPLE: CPT | Mod: XS

## 2023-12-20 PROCEDURE — 88305 TISSUE EXAM BY PATHOLOGIST: CPT | Mod: 26

## 2023-12-20 PROCEDURE — 88342 IMHCHEM/IMCYTCHM 1ST ANTB: CPT

## 2023-12-20 PROCEDURE — 88312 SPECIAL STAINS GROUP 1: CPT

## 2023-12-20 PROCEDURE — 88305 TISSUE EXAM BY PATHOLOGIST: CPT

## 2023-12-20 DEVICE — CATH BLLN CRE .035INX7.7FR 15-16.5-18MM: Type: IMPLANTABLE DEVICE | Status: FUNCTIONAL

## 2023-12-20 NOTE — H&P ADULT - NSHPSOCIALHISTORY_GEN_ALL_CORE
Social History    Current non-smoker (V49.89) (Z78.9)  Disabled  Former smoker (V15.82) (Z87.891)   · quit 1975, smoked for 16 years, avg 2 ppd    No alcohol use  No drug use

## 2023-12-20 NOTE — H&P ADULT - HISTORY OF PRESENT ILLNESS
History of Present Illness as obtained from outpatient visit with Dr torres 11/19/23       78-year male who presents today for a follow-up appointment. Previously, he has been followed for sliding hiatal hernia and severe acid reflux symptoms. He is status post an upper endoscopy with balloon dilation on 9/20/2023. He has also undergone Manometry testing, along with BRAVO testing (aborted and he was prescribed Omeprazole 40mg BID by Dr. Gupta). and a barium esophagram.    Additional past medical history includes esophageal dysmotility (and stricture vs Schatzki ring s/p esophageal dilation x 3 due to hiccups, bubble and vomiting - Minesh Solares [place]), GERD, Myasthenia Gravis. He has been evaluated by multiple GI doctors at [facility] and [facility] for GI disturbances.    he is scheduled today because He continues to be experiencing significant acid reflux symptoms, despite taking Omeprazole 40mg BID, Famotidine 40mg daily, and Pepto-Bismol/TUMS as needed. He is also experiencing acid burning sensation/acidic taste in his mouth/chest with the need to constantly clear his throat. He does continue to experience significant diarrhea, which he attributes to the Omeprazole; he as an appointment with GI Dr. Solares tomorrow 11/17.      Neurologist- Dr. 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)  Gastro-esophageal reflux disease without esophagitis (530.81) (K21.9)  H/O esophageal ulcer (V12.79) (Z87.19)  Hiatal hernia with GERD and esophagitis (553.3,530.11) (K44.9,K21.00)  Impacted cerumen of left ear (380.4) (H61.22)  Lumbago (724.2) (M54.50)  Mixed conductive and sensorineural hearing loss, unilateral with unrestricted hearing on  the contralateral side (389.21)  Muscle weakness (728.87) (M62.81)  Myalgia (729.1) (M79.10)  Myasthenia gravis (358.00) (G70.00)  Myositis (729.1) (M60.9)  Obstructive sleep apnea, adult (327.23) (G47.33)  Postprocedural hypertension (997.91) (I97.3)  Pre-op testing (V72.84) (Z01.818)  Presence of Watchman left atrial appendage closure device (V45.09) (Z95.818)  Right-sided carotid artery disease, unspecified type (447.9) (I77.9)  Sensorineural hearing loss, bilateral (389.18) (H90.3)  SNHL (sensorineural hearing loss) (389.10) (H90.5)  Strain of groin, right, subsequent encounter (V58.89) (S76.211D)  Submandibular sialoadenitis (527.2) (K11.20)  Sudden idiopathic hearing loss of left ear (388.2) (H91.22)  Sudden idiopathic hearing loss, left ear (388.2) (H91.22)  Tinnitus of left ear (388.30) (H93.12)              Past Medical History    History of Ear foreign body, left, subsequent encounter (V58.89) (T16.2XXD)  History of atrial fibrillation (V12.59) (Z86.79)  History of cardiovascular disorder (V12.50) (Z86.79)  History of chronic rhinitis (V12.69) (Z87.09)  History of drainage from ear (V12.49) (Z86.69)  History of esophageal reflux (V12.79) (Z87.19)  History of gastroesophageal reflux (GERD) (V12.79) (Z87.19)  History of hypercholesterolemia (V12.29) (Z86.39)  History of hypertension (V12.59) (Z86.79)  History of impaired glucose tolerance (V12.29) (Z87.898)  History of inguinal hernia (V12.79) (Z87.19)  History of TIAs (V12.54) (Z86.73)  History of transient cerebral ischemia (V12.54) (Z86.73)  History of Hypertrophy of nasal turbinates (478.0) (J34.3)  History of Incarcerated right inguinal hernia (550.10) (K40.30)  History of Inguinal strain, right, initial encounter (848.8) (S76.211A)  History of Leg edema (782.3) (R60.0)  History of Other noninfectious chronic otitis externa of left ear (380.89) (H60.8X2)  History of Other specified disorders of nose and nasal sinuses (478.19) (J34.89)  History of Paroxysmal atrial fibrillation (427.31) (I48.0)        Surgical History    History of Atrial appendage closure device insertion  History of Cardiac cath His ablation  History of Inguinal hernia repair  History of Loop recorder insertion        Family History    Family history of blood dyscrasia (V18.3) (Z83.2) : Father  No pertinent family history : Sibling  Family history of Old age : Mother History of Present Illness as obtained from outpatient visit with Dr torres 11/19/23       78-year male who presents today for a follow-up appointment. Previously, he has been followed for sliding hiatal hernia and severe acid reflux symptoms. He is status post an upper endoscopy with balloon dilation on 9/20/2023. He has also undergone Manometry testing, along with BRAVO testing (aborted and he was prescribed Omeprazole 40mg BID by Dr. Gupta). and a barium esophagram.    Additional past medical history includes esophageal dysmotility (and stricture vs Schatzki ring s/p esophageal dilation x 3 due to hiccups, bubble and vomiting - Minesh Solares [place]), GERD, Myasthenia Gravis. He has been evaluated by multiple GI doctors at [facility] and [facility] for GI disturbances.    he is scheduled today because He continues to be experiencing significant acid reflux symptoms, despite taking Omeprazole 40mg BID, Famotidine 40mg daily, and Pepto-Bismol/TUMS as needed. He is also experiencing acid burning sensation/acidic taste in his mouth/chest with the need to constantly clear his throat. He does continue to experience significant diarrhea, which he attributes to the Omeprazole; he as an appointment with GI Dr. Solares tomorrow 11/17.      Neurologist- Dr. 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)  Gastro-esophageal reflux disease without esophagitis (530.81) (K21.9)  H/O esophageal ulcer (V12.79) (Z87.19)  Hiatal hernia with GERD and esophagitis (553.3,530.11) (K44.9,K21.00)  Impacted cerumen of left ear (380.4) (H61.22)  Lumbago (724.2) (M54.50)  Mixed conductive and sensorineural hearing loss, unilateral with unrestricted hearing on  the contralateral side (389.21)  Muscle weakness (728.87) (M62.81)  Myalgia (729.1) (M79.10)  Myasthenia gravis (358.00) (G70.00)  Myositis (729.1) (M60.9)  Obstructive sleep apnea, adult (327.23) (G47.33)  Postprocedural hypertension (997.91) (I97.3)  Pre-op testing (V72.84) (Z01.818)  Presence of Watchman left atrial appendage closure device (V45.09) (Z95.818)  Right-sided carotid artery disease, unspecified type (447.9) (I77.9)  Sensorineural hearing loss, bilateral (389.18) (H90.3)  SNHL (sensorineural hearing loss) (389.10) (H90.5)  Strain of groin, right, subsequent encounter (V58.89) (S76.211D)  Submandibular sialoadenitis (527.2) (K11.20)  Sudden idiopathic hearing loss of left ear (388.2) (H91.22)  Sudden idiopathic hearing loss, left ear (388.2) (H91.22)  Tinnitus of left ear (388.30) (H93.12)              Past Medical History    History of Ear foreign body, left, subsequent encounter (V58.89) (T16.2XXD)  History of atrial fibrillation (V12.59) (Z86.79)  History of cardiovascular disorder (V12.50) (Z86.79)  History of chronic rhinitis (V12.69) (Z87.09)  History of drainage from ear (V12.49) (Z86.69)  History of esophageal reflux (V12.79) (Z87.19)  History of gastroesophageal reflux (GERD) (V12.79) (Z87.19)  History of hypercholesterolemia (V12.29) (Z86.39)  History of hypertension (V12.59) (Z86.79)  History of impaired glucose tolerance (V12.29) (Z87.898)  History of inguinal hernia (V12.79) (Z87.19)  History of TIAs (V12.54) (Z86.73)  History of transient cerebral ischemia (V12.54) (Z86.73)  History of Hypertrophy of nasal turbinates (478.0) (J34.3)  History of Incarcerated right inguinal hernia (550.10) (K40.30)  History of Inguinal strain, right, initial encounter (848.8) (S76.211A)  History of Leg edema (782.3) (R60.0)  History of Other noninfectious chronic otitis externa of left ear (380.89) (H60.8X2)  History of Other specified disorders of nose and nasal sinuses (478.19) (J34.89)  History of Paroxysmal atrial fibrillation (427.31) (I48.0)        Surgical History    History of Atrial appendage closure device insertion  History of Cardiac cath His ablation  History of Inguinal hernia repair  History of Loop recorder insertion        Family History    Family history of blood dyscrasia (V18.3) (Z83.2) : Father  No pertinent family history : Sibling  Family history of Old age : Mother

## 2023-12-20 NOTE — H&P ADULT - ASSESSMENT
78-year male who presents today for a follow-up appointment. Previously, he has been followed for sliding hiatal hernia and severe acid reflux symptoms. He is status post an upper endoscopy with balloon dilation on 9/20/2023. He has also undergone Manometry testing, along with BRAVO testing (aborted and he was prescribed Omeprazole 40mg BID by Dr. Gupta). and a barium esophagram.    Additional past medical history includes esophageal dysmotility (and stricture vs Schatzki ring s/p esophageal dilation x 3 due to hiccups, bubble and vomiting - Minesh Solares [place]), GERD, Myasthenia Gravis. He has been evaluated by multiple GI doctors at [facility] and [facility] for GI disturbances.    Now presenting through [facility] for scheduled EGD w/ dilation for continued GERD/reflux symptoms  78-year male who presents today for a follow-up appointment. Previously, he has been followed for sliding hiatal hernia and severe acid reflux symptoms. He is status post an upper endoscopy with balloon dilation on 9/20/2023. He has also undergone Manometry testing, along with BRAVO testing (aborted and he was prescribed Omeprazole 40mg BID by Dr. Gupta). and a barium esophagram.    Additional past medical history includes esophageal dysmotility (and stricture vs Schatzki ring s/p esophageal dilation x 3 due to hiccups, bubble and vomiting - Minesh Solares [place]), GERD, Myasthenia Gravis. He has been evaluated by multiple GI doctors at [facility] and [facility] for GI disturbances.    Now presenting through \[facility]\"" for scheduled EGD w/ dilation for continued GERD/reflux symptoms

## 2023-12-26 LAB
SURGICAL PATHOLOGY STUDY: SIGNIFICANT CHANGE UP
SURGICAL PATHOLOGY STUDY: SIGNIFICANT CHANGE UP

## 2024-01-04 ENCOUNTER — APPOINTMENT (OUTPATIENT)
Dept: THORACIC SURGERY | Facility: CLINIC | Age: 79
End: 2024-01-04
Payer: MEDICARE

## 2024-01-04 VITALS
HEIGHT: 66 IN | SYSTOLIC BLOOD PRESSURE: 130 MMHG | WEIGHT: 131 LBS | OXYGEN SATURATION: 100 % | DIASTOLIC BLOOD PRESSURE: 65 MMHG | HEART RATE: 62 BPM | RESPIRATION RATE: 16 BRPM | BODY MASS INDEX: 21.05 KG/M2 | TEMPERATURE: 97.6 F

## 2024-01-04 DIAGNOSIS — Z87.891 PERSONAL HISTORY OF NICOTINE DEPENDENCE: ICD-10-CM

## 2024-01-04 PROCEDURE — 99214 OFFICE O/P EST MOD 30 MIN: CPT

## 2024-01-04 NOTE — REASON FOR VISIT
[Follow-Up: _____] : a [unfilled] follow-up visit [FreeTextEntry1] : Upper endoscopy with balloon dilation 12/20/2023

## 2024-01-04 NOTE — HISTORY OF PRESENT ILLNESS
[FreeTextEntry1] : Mr. Haji, a 78-year-old male, is here for a follow-up appointment status post upper endoscopy with balloon dilation performed on 12/20/2023. Previously, he had been under observation for a sliding hiatal hernia and severe acid reflux symptoms. He is status post an upper endoscopy with balloon dilation on 9/20/2023. He has also undergone Manometry testing, BRAVO testing (aborted by Dr. Gupta), and an Esophragm.   He is diagnosed with gastroesophageal reflux disease, Grade D esophagitis, and a hiatal hernia. In addition, an esophageal stricture has been identified, which has been managed with balloon dilations in the past. During our previous office visit we discussed in detailed discussions regarding the pathophysiology of gastroesophageal reflux disease, esophagitis, hiatal hernia, and the esophageal anatomy in relation to his pronounced symptoms. The options of esophagectomy versus hiatal hernia repair were thoroughly explored. Considering his medical history, particularly the presence of myasthenia gravis, an esophagectomy was deemed a high-risk intervention. However, it was acknowledged that a hiatal hernia repair would not address his existing esophageal stricture. After extensive deliberation, Mr. Haji is fully informed about the risks and benefits associated with both an esophagectomy and hiatal hernia repair, recognizing the persistence of a distal esophageal stricture in the latter case.  Additional past medical history includes esophageal dysmotility (and stricture vs Schatzki ring s/p esophageal dilation x 3 due to hiccups, bubble and vomiting - Minesh Solares [place]), GERD, Myasthenia Gravis. He has been evaluated by multiple GI doctors at [facility] and [facility] for GI disturbances.  Today he presents with his wife who assists with the history taking. He continues to be experiencing significant acid reflux symptoms, despite taking Omeprazole 40mg BID, Famotidine 40mg every night, and Pepto-Bismol/TUMS as needed. He is also experiencing acid burning sensation/acidic taste in his mouth/chest with the need to constantly clear his throat. He does continue to experience significant diarrhea, which he attributes to the Omeprazole and is being followed by GI Dr. Solares. He denies dysphagia symptoms.

## 2024-01-04 NOTE — DATA REVIEWED
[FreeTextEntry1] : Accession:                             [id number] Collected Date/Time:                   12/20/2023 17:00 EST Received Date/Time:                    12/21/2023 06:46 EST  Addendum Report - Auth (Verified)  Addendum  1. Gastroesophageal junction , biopsy - GMS stain for fungus: Negative   2. Esophagus, mid at 26 cm , biopsy - GMS stain for fungus: Negative  Verified by: Emmanuel Poole M.D. (Electronic Signature)  Surgical Pathology Report - Auth (Verified) Specimen(s) Submitted 1  Antrum biopsy 2  GE Junction biopsy 3  Mid esophagus at 26   Final Diagnosis  1. Stomach, antrum , biopsy - Dilated oxyntic glands suggesting proton pump inhibitor effect - No increased inflammation seen - No Helicobacter pylori  identified  (immunostain)  2. Gastroesophageal junction,  , biopsy - Squamous and gastric columnar mucosa - Moderate acute and chronic inflammation - Ulcer bed - No increased eosinophils, intestinal goblet cell metaplasia, or dysplasia identified  3. Esophagus, mid @ 26 cm, biopsy - Squamous mucosa - Focus of acute and chronic inflammation with surface inflammatory debris - No increased eosinophils, glandular mucosa, or dysplasia identified  Verified by: Emmanuel Poole M.D. (Electronic Signature)      EXAM: XR ESOPH SNGL CON STUDY ORDERED BY: GERALDINE WALTON PROCEDURE DATE: 11/02/2023 INTERPRETATION: Clinical history: 78-year-old male acid reflux, hiatal hernia.  Esophagram was performed, cine images were obtained. There are no previous fluoroscopic studies available for comparison, correlation is made with the chest CT of 8/26/2023.  FINDINGS: Normal transit of contrast through the pharynx, esophagus stomach and duodenum.  Smooth posterior impression at the C5 level most suspicious for a prominent cricopharyngeal muscle/bore noted (images 46-47 of series 2).  A small hiatal hernia with normal mucosa is evident (images 321-359 of series 3, series 4 and series 8).  A 1 cm segment of smooth narrowing to a luminal diameter of 0.5 cm just proximal to the hiatal hernia is evident (images 103-194 of series 2).  No gastroesophageal reflux or Zenker's diverticulum.  Normal mucosa with no intraluminal filling defect.  Fluoroscopy time: 1.5 minutes.  IMPRESSION:  No gastroesophageal reflux.  Small hiatal hernia with normal mucosa and a 1 cm segment of smooth narrowing of the distal esophagus to 0.5 cm, just proximal to the hernia.  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 Antrum biopsy: - Gastric oxyntic type mucosa, negative for inflammation. No  intestinal metaplasia seen. Negative for carcinoma. No Helicobacter pylori is seen by H T E or immunohistochemistry. 2. GE Junction biopsy: - Ulcer bed with acute inflammation and refractile material, possibly pill fragments. 3. Mid-esophagus biopsy: - Squamous mucosa, negative for inflammation. Verified by: Estiven Akins MD (Electronic Signature) Reported on: 09/27/23 08:04 EDT.

## 2024-01-04 NOTE — PHYSICAL EXAM
[Sclera] : the sclera and conjunctiva were normal [Neck Appearance] : the appearance of the neck was normal [] : no respiratory distress [Respiration, Rhythm And Depth] : normal respiratory rhythm and effort [Auscultation Breath Sounds / Voice Sounds] : lungs were clear to auscultation bilaterally [Heart Rate And Rhythm] : heart rate was normal and rhythm regular [Heart Sounds] : normal S1 and S2 [Examination Of The Chest] : the chest was normal in appearance [FreeTextEntry2] : no edema [Abdomen Tenderness] : non-tender [Abnormal Walk] : normal gait [Skin Color & Pigmentation] : normal skin color and pigmentation [Sensation] : the sensory exam was normal to light touch and pinprick [Motor Exam] : the motor exam was normal [Oriented To Time, Place, And Person] : oriented to person, place, and time [Impaired Insight] : insight and judgment were intact [Affect] : the affect was normal [Mood] : the mood was normal

## 2024-01-04 NOTE — ASSESSMENT
[FreeTextEntry1] : Mr. Haji, a 78-year-old male, is here for a follow-up office visit following an upper endoscopy with balloon dilation performed on 12/20/2023. Previously, he had been under observation for a sliding hiatal hernia and severe acid reflux symptoms. He is status post an upper endoscopy with balloon dilation on 9/20/2023. He has also undergone Manometry testing, BRAVO testing (aborted by Dr. Gupta), and an Esophragm.   Today he presents with his wife who assists with the history taking. He continues to be experiencing significant acid reflux symptoms, despite taking Omeprazole 40mg BID, Famotidine 40mg every night, and Pepto-Bismol/TUMS as needed. He is also experiencing acid burning sensation/acidic taste in his mouth/chest with the need to constantly clear his throat. He does continue to experience significant diarrhea, which he attributes to the Omeprazole and is being followed by GI Dr. Solares. He denies dysphagia symptoms.  I have independently reviewed the medical records and imaging at the time of this office consultation, and discussed the following interpretations with Mr. Haji and his wife. We re-discussed that in great detail the pathophysiology of gastroesophageal reflux disease, Grade D esophagitis, hiatal hernia, and the esophageal anatomy in relation to his pronounced symptoms. The options of esophagectomy versus hiatal hernia repair were thoroughly explored. Considering his medical history, particularly the presence of myasthenia gravis, an esophagectomy was deemed a high-risk intervention. However, it was acknowledged that a hiatal hernia repair would not address his existing esophageal stricture. After extensive deliberation, Mr. Haji is fully informed about the risks and benefits associated with both an esophagectomy and hiatal hernia repair, recognizing the persistence of a distal esophageal stricture in the latter case.   We reviewed that during the 12/20/23 upper endoscopy, there was a noted narrowing that was not obstructive; that area was dilated but there was no major yield. Given these findings, I am recommending that he proceed with a Hiatal Hernia Repair with Fundoplication. The planned procedure, hospital stay and recovery was discussed in detail. All risks, benefits and alternatives were discussed at length with the patient. All questions addressed. The patient fully understood and would like to proceed with surgical intervention as discussed. Prior to the above surgical intervention, given his history of Myasthenia Graivs I will require neurology, medical and cardiology clearance.  Pre-operative Checklist reviewed with patient today in office: - Confirm allergies, including latex: PENICILLIN - Confirm pacemaker/defibrillator: NONE - Anticoagulation/antiplatelets noted and will be discontinued/continued: ASPIRIN (d/c 3 days prior) - SGLT-2 Inhibitors noted and will be discontinued 3 days prior to surgery: NONE  PLAN: - Neurology clearance - Medical clearance - Cardiology clearance - Follow up with rheumatologist regarding Prolia injection prior to surgery  - Surgery - Upper Endoscopy/Robot assisted Hiatal Hernia Repair with fundoplication , book for 2/12/24 (patient prefers a Monday, if possible)   I, Dr. Evangelista, personally performed the evaluation and management (E/M) services for this established patient who presents today with (a) new problem(s)/exacerbation of (an) existing condition(s).  That E/M includes conducting the examination, assessing all new/exacerbated conditions, and establishing a new plan of care.  Today, my ACP, Claudia Aguiar NP, was here to observe my evaluation and management services for this new problem/exacerbated condition to be followed going forward.

## 2024-01-04 NOTE — REVIEW OF SYSTEMS
[Fever] : no fever [Chills] : no chills [Feeling Poorly] : not feeling poorly [Feeling Tired] : feeling tired [Chest Pain] : no chest pain [Palpitations] : no palpitations [Lower Ext Edema] : no extremity edema [Shortness Of Breath] : no shortness of breath [Wheezing] : no wheezing [Cough] : no cough [SOB on Exertion] : no shortness of breath during exertion [As Noted in HPI] : as noted in HPI [Negative] : Heme/Lymph

## 2024-01-08 ENCOUNTER — APPOINTMENT (OUTPATIENT)
Dept: OTOLARYNGOLOGY | Facility: CLINIC | Age: 79
End: 2024-01-08
Payer: MEDICARE

## 2024-01-08 VITALS
WEIGHT: 129 LBS | BODY MASS INDEX: 20.73 KG/M2 | HEIGHT: 66 IN | HEART RATE: 63 BPM | SYSTOLIC BLOOD PRESSURE: 147 MMHG | DIASTOLIC BLOOD PRESSURE: 72 MMHG

## 2024-01-08 DIAGNOSIS — T16.2XXA FOREIGN BODY IN LEFT EAR, INITIAL ENCOUNTER: ICD-10-CM

## 2024-01-08 PROCEDURE — 69200 CLEAR OUTER EAR CANAL: CPT | Mod: LT

## 2024-01-24 ENCOUNTER — OUTPATIENT (OUTPATIENT)
Dept: OUTPATIENT SERVICES | Facility: HOSPITAL | Age: 79
LOS: 1 days | End: 2024-01-24
Payer: MEDICARE

## 2024-01-24 VITALS
SYSTOLIC BLOOD PRESSURE: 120 MMHG | RESPIRATION RATE: 16 BRPM | WEIGHT: 136.69 LBS | HEART RATE: 90 BPM | TEMPERATURE: 97 F | DIASTOLIC BLOOD PRESSURE: 88 MMHG | OXYGEN SATURATION: 97 % | HEIGHT: 66 IN

## 2024-01-24 DIAGNOSIS — Z98.890 OTHER SPECIFIED POSTPROCEDURAL STATES: Chronic | ICD-10-CM

## 2024-01-24 DIAGNOSIS — Z01.818 ENCOUNTER FOR OTHER PREPROCEDURAL EXAMINATION: ICD-10-CM

## 2024-01-24 DIAGNOSIS — Z13.89 ENCOUNTER FOR SCREENING FOR OTHER DISORDER: ICD-10-CM

## 2024-01-24 DIAGNOSIS — Z29.9 ENCOUNTER FOR PROPHYLACTIC MEASURES, UNSPECIFIED: ICD-10-CM

## 2024-01-24 DIAGNOSIS — H26.8 OTHER SPECIFIED CATARACT: Chronic | ICD-10-CM

## 2024-01-24 DIAGNOSIS — I48.91 UNSPECIFIED ATRIAL FIBRILLATION: ICD-10-CM

## 2024-01-24 DIAGNOSIS — K44.9 DIAPHRAGMATIC HERNIA WITHOUT OBSTRUCTION OR GANGRENE: ICD-10-CM

## 2024-01-24 DIAGNOSIS — Z86.69 PERSONAL HISTORY OF OTHER DISEASES OF THE NERVOUS SYSTEM AND SENSE ORGANS: ICD-10-CM

## 2024-01-24 DIAGNOSIS — Z95.828 PRESENCE OF OTHER VASCULAR IMPLANTS AND GRAFTS: Chronic | ICD-10-CM

## 2024-01-24 DIAGNOSIS — Z95.818 PRESENCE OF OTHER CARDIAC IMPLANTS AND GRAFTS: Chronic | ICD-10-CM

## 2024-01-24 LAB
A1C WITH ESTIMATED AVERAGE GLUCOSE RESULT: 4.7 % — SIGNIFICANT CHANGE UP (ref 4–5.6)
ANION GAP SERPL CALC-SCNC: 11 MMOL/L — SIGNIFICANT CHANGE UP (ref 5–17)
APTT BLD: 35.2 SEC — SIGNIFICANT CHANGE UP (ref 24.5–35.6)
BASOPHILS # BLD AUTO: 0.02 K/UL — SIGNIFICANT CHANGE UP (ref 0–0.2)
BASOPHILS NFR BLD AUTO: 0.3 % — SIGNIFICANT CHANGE UP (ref 0–2)
BLD GP AB SCN SERPL QL: SIGNIFICANT CHANGE UP
BUN SERPL-MCNC: 13.7 MG/DL — SIGNIFICANT CHANGE UP (ref 8–20)
CALCIUM SERPL-MCNC: 8.8 MG/DL — SIGNIFICANT CHANGE UP (ref 8.4–10.5)
CHLORIDE SERPL-SCNC: 105 MMOL/L — SIGNIFICANT CHANGE UP (ref 96–108)
CO2 SERPL-SCNC: 25 MMOL/L — SIGNIFICANT CHANGE UP (ref 22–29)
CREAT SERPL-MCNC: 0.61 MG/DL — SIGNIFICANT CHANGE UP (ref 0.5–1.3)
EGFR: 99 ML/MIN/1.73M2 — SIGNIFICANT CHANGE UP
EOSINOPHIL # BLD AUTO: 0.04 K/UL — SIGNIFICANT CHANGE UP (ref 0–0.5)
EOSINOPHIL NFR BLD AUTO: 0.6 % — SIGNIFICANT CHANGE UP (ref 0–6)
ESTIMATED AVERAGE GLUCOSE: 88 MG/DL — SIGNIFICANT CHANGE UP (ref 68–114)
GLUCOSE SERPL-MCNC: 75 MG/DL — SIGNIFICANT CHANGE UP (ref 70–99)
HCT VFR BLD CALC: 42.1 % — SIGNIFICANT CHANGE UP (ref 39–50)
HGB BLD-MCNC: 13.9 G/DL — SIGNIFICANT CHANGE UP (ref 13–17)
IMM GRANULOCYTES NFR BLD AUTO: 0.3 % — SIGNIFICANT CHANGE UP (ref 0–0.9)
INR BLD: 1.11 RATIO — SIGNIFICANT CHANGE UP (ref 0.85–1.18)
LYMPHOCYTES # BLD AUTO: 0.82 K/UL — LOW (ref 1–3.3)
LYMPHOCYTES # BLD AUTO: 11.8 % — LOW (ref 13–44)
MCHC RBC-ENTMCNC: 32 PG — SIGNIFICANT CHANGE UP (ref 27–34)
MCHC RBC-ENTMCNC: 33 GM/DL — SIGNIFICANT CHANGE UP (ref 32–36)
MCV RBC AUTO: 96.8 FL — SIGNIFICANT CHANGE UP (ref 80–100)
MONOCYTES # BLD AUTO: 0.69 K/UL — SIGNIFICANT CHANGE UP (ref 0–0.9)
MONOCYTES NFR BLD AUTO: 9.9 % — SIGNIFICANT CHANGE UP (ref 2–14)
NEUTROPHILS # BLD AUTO: 5.37 K/UL — SIGNIFICANT CHANGE UP (ref 1.8–7.4)
NEUTROPHILS NFR BLD AUTO: 77.1 % — HIGH (ref 43–77)
PLATELET # BLD AUTO: 304 K/UL — SIGNIFICANT CHANGE UP (ref 150–400)
POTASSIUM SERPL-MCNC: 4.5 MMOL/L — SIGNIFICANT CHANGE UP (ref 3.5–5.3)
POTASSIUM SERPL-SCNC: 4.5 MMOL/L — SIGNIFICANT CHANGE UP (ref 3.5–5.3)
PROTHROM AB SERPL-ACNC: 12.3 SEC — SIGNIFICANT CHANGE UP (ref 9.5–13)
RBC # BLD: 4.35 M/UL — SIGNIFICANT CHANGE UP (ref 4.2–5.8)
RBC # FLD: 14.6 % — HIGH (ref 10.3–14.5)
SODIUM SERPL-SCNC: 141 MMOL/L — SIGNIFICANT CHANGE UP (ref 135–145)
WBC # BLD: 6.96 K/UL — SIGNIFICANT CHANGE UP (ref 3.8–10.5)
WBC # FLD AUTO: 6.96 K/UL — SIGNIFICANT CHANGE UP (ref 3.8–10.5)

## 2024-01-24 PROCEDURE — 93010 ELECTROCARDIOGRAM REPORT: CPT

## 2024-01-24 PROCEDURE — G0463: CPT

## 2024-01-24 PROCEDURE — 93005 ELECTROCARDIOGRAM TRACING: CPT

## 2024-01-24 RX ORDER — CHOLECALCIFEROL (VITAMIN D3) 125 MCG
1 CAPSULE ORAL
Qty: 0 | Refills: 0 | DISCHARGE

## 2024-01-24 RX ORDER — SODIUM CHLORIDE 9 MG/ML
3 INJECTION INTRAMUSCULAR; INTRAVENOUS; SUBCUTANEOUS EVERY 8 HOURS
Refills: 0 | Status: DISCONTINUED | OUTPATIENT
Start: 2024-02-12 | End: 2024-02-15

## 2024-01-24 NOTE — H&P PST ADULT - OTHER CARE PROVIDERS
PCP Dr. Akhtar [phone], cardiologist Dr. Goodrich  [phone] PCP Dr. Akhtar [phone], cardiologist Dr. Goodrich  [phone], neurology

## 2024-01-24 NOTE — H&P PST ADULT - NSICDXPASTMEDICALHX_GEN_ALL_CORE_FT
PAST MEDICAL HISTORY:  Atrial fibrillation, unspecified type     Benign prostatic hyperplasia, presence of lower urinary tract symptoms unspecified, unspecified morphology     GERD (gastroesophageal reflux disease)     Glaucoma     Herpes virus disease right eye    History of myasthenia gravis 2016 , no weakness, in remission    HLD (hyperlipidemia)     Iron deficiency anemia     Nasal polyp     Osteopenia     Right hip pain avasular necrosis of right femoral head 2018    TIA (transient ischemic attack) 2006 PAST MEDICAL HISTORY:  Atrial fibrillation, unspecified type     Benign prostatic hyperplasia, presence of lower urinary tract symptoms unspecified, unspecified morphology     GERD (gastroesophageal reflux disease)     Glaucoma     Herpes virus disease right eye    History of myasthenia gravis 2016 , no weakness, in remission    HLD (hyperlipidemia)     Iron deficiency anemia     Nasal polyp     Osteopenia     Right hip pain avasular necrosis of right femoral head 2018    TIA (transient ischemic attack) 2006

## 2024-01-24 NOTE — H&P PST ADULT - PROBLEM SELECTOR PLAN 3
Labs performed  Continue medication.  Patient instructed to take mestinon with a sip of water morning of surgery.  Neurology clearance pending.

## 2024-01-24 NOTE — H&P PST ADULT - NSICDXPASTSURGICALHX_GEN_ALL_CORE_FT
PAST SURGICAL HISTORY:  Central venous catheter in place, temporary vortex port implant on right chest    H/O cardiac radiofrequency ablation May 2018    H/O coronary angiogram 3/2012 no stents    H/O nasal polypectomy wears nasal strip for better breathing    Other cataract right    Status post placement of implantable loop recorder 4/2018 left chest PAST SURGICAL HISTORY:  Central venous catheter in place, temporary vortex port implant on right chest    H/O cardiac radiofrequency ablation May 2018    H/O colonoscopy     H/O coronary angiogram 3/2012 no stents    H/O endoscopy     H/O nasal polypectomy wears nasal strip for better breathing    Other cataract right    Status post placement of implantable loop recorder 4/2018 left chest

## 2024-01-24 NOTE — H&P PST ADULT - ASSESSMENT
This is a 78 year old male presenting today for PST, PMH includes TIA, a-fib, anemia, esophageal dysmotility (and stricture vs Schatzki ring s/p esophageal dilation x 3 due to hiccups, bubble and vomiting - Minesh Solares [place]), GERD, Myasthenia Gravis. Patient c/o acid reflux and diarrhea. He reports severe burning of the chest and severe migraines. He can feel the pressure under his ribs with eating which he feels is due to the hernia. He reports feeling "bloated".  Today he presents with his wife Maurilio who assists with the history taking. Reports trying diet and lifestyle changes to address his acid reflux with no improvement. He is now scheduled for upper endoscopy robot assisted hiatal hernia repair with fundoplication on 24 with Dr. Evangelista pending medical, cardiac and neurology clearance.           CAPRINI SCORE    AGE RELATED RISK FACTORS                                                             [ ] Age 41-60 years                                            (1 Point)  [ ] Age: 61-74 years                                           (2 Points)                 [ X] Age= 75 years                                                (3 Points)             DISEASE RELATED RISK FACTORS                                                       [ ] Edema in the lower extremities                 (1 Point)                     [X ] Varicose veins                                               (1 Point)                                 [ ] BMI > 25 Kg/m2                                            (1 Point)                                  [ ] Serious infection (ie PNA)                            (1 Point)                     [ ] Lung disease ( COPD, Emphysema)            (1 Point)                                                                          [ ] Acute myocardial infarction                         (1 Point)                  [ ] Congestive heart failure (in the previous month)  (1 Point)         [ ] Inflammatory bowel disease                            (1 Point)                  [ ] Central venous access, PICC or Port               (2 points)       (within the last month)                                                                [ ] Stroke (in the previous month)                        (5 Points)    [ ] Previous or present malignancy                       (2 points)                                                                                                                                                         HEMATOLOGY RELATED FACTORS                                                         [ ] Prior episodes of VTE                                     (3 Points)                     [ ] Positive family history for VTE                      (3 Points)                  [ ] Prothrombin 26706 A                                     (3 Points)                     [ ] Factor V Leiden                                                (3 Points)                        [ ] Lupus anticoagulants                                      (3 Points)                                                           [ ] Anticardiolipin antibodies                              (3 Points)                                                       [ ] High homocysteine in the blood                   (3 Points)                                             [ ] Other congenital or acquired thrombophilia      (3 Points)                                                [ ] Heparin induced thrombocytopenia                  (3 Points)                                        MOBILITY RELATED FACTORS  [ ] Bed rest                                                         (1 Point)  [ ] Plaster cast                                                    (2 points)  [ ] Bed bound for more than 72 hours           (2 Points)    GENDER SPECIFIC FACTORS  [ ] Pregnancy or had a baby within the last month   (1 Point)  [ ] Post-partum < 6 weeks                                   (1 Point)  [ ] Hormonal therapy  or oral contraception   (1 Point)  [ ] History of pregnancy complications              (1 point)  [ ] Unexplained or recurrent              (1 Point)    OTHER RISK FACTORS                                           (1 Point)  [X ] BMI >40, smoking, diabetes requiring insulin, chemotherapy  blood transfusions and length of surgery over 2 hours    SURGERY RELATED RISK FACTORS  [ ]  Section within the last month     (1 Point)  [ ] Minor surgery                                                  (1 Point)  [ ] Arthroscopic surgery                                       (2 Points)  [ X] Planned major surgery lasting more            (2 Points)      than 45 minutes     [ ] Elective hip or knee joint replacement       (5 points)       surgery                                                TRAUMA RELATED RISK FACTORS  [ ] Fracture of the hip, pelvis, or leg                       (5 Points)  [ ] Spinal cord injury resulting in paralysis             (5 points)       (in the previous month)    [ ] Paralysis  (less than 1 month)                             (5 Points)  [ ] Multiple Trauma within 1 month                        (5 Points)    Total Score [    7    ]    Caprini Score 0-2: Low Risk, NO VTE prophylaxis required for most patients, encourage ambulation  Caprini Score 3-6: Moderate Risk , pharmacologic VTE prophylaxis is indicated for most patients (in the absence of contraindications)  Caprini Score Greater than or =7: High risk, pharmocologic VTE prophylaxis indicated for most patients (in the absence of contraindications)      OPIOID RISK TOOL    HUNTER EACH BOX THAT APPLIES AND ADD TOTALS AT THE END    FAMILY HISTORY OF SUBSTANCE ABUSE                 FEMALE         MALE                                                Alcohol                             [  ]1 pt          [  ]3pts                                               Illegal Durgs                     [  ]2 pts        [  ]3pts                                               Rx Drugs                           [  ]4 pts        [  ]4 pts    PERSONAL HISTORY OF SUBSTANCE ABUSE                                                                                          Alcohol                             [  ]3 pts       [  ]3 pts                                               Illegal Drugs                     [  ]4 pts        [  ]4 pts                                               Rx Drugs                           [  ]5 pts        [  ]5 pts    AGE BETWEEN 16-45 YEARS                                      [  ]1 pt         [  ]1 pt    HISTORY OF PREADOLESCENT   SEXUAL ABUSE                                                             [  ]3 pts        [  ]0pts    PSYCHOLOGICAL DISEASE                     ADD, OCD, Bipolar, Schizophrenia        [  ]2 pts         [  ]2 pts                      Depression                                               [  ]1 pt           [  ]1 pt           SCORING TOTAL   (add numbers and type here)              (*0**)                                     A score of 3 or lower indicated LOW risk for future opioid abuse  A score of 4 to 7 indicated moderate risk for future opioid abuse  A score of 8 or higher indicates a high risk for opioid abuse

## 2024-01-24 NOTE — H&P PST ADULT - NSICDXFAMILYHX_GEN_ALL_CORE_FT
standing/actual FAMILY HISTORY:  Sibling  Still living? Unknown  Family history of anemia, Age at diagnosis: Age Unknown FAMILY HISTORY:  Sibling  Still living? Unknown  Family history of anemia, Age at diagnosis: Age Unknown

## 2024-01-24 NOTE — H&P PST ADULT - HISTORY OF PRESENT ILLNESS
Patient is a 78 year old  female presenting today for PST, PMH includes   Patient is a 78 year old male presenting today for PST, PMH includes TIA, a-fib, anemia, esophageal dysmotility (and stricture vs Schatzki ring s/p esophageal dilation x 3 due to hiccups, bubble and vomiting - Minesh Solares [place]), GERD, Myasthenia Gravis. Patient c/o acid reflux and diarrhea. He reports severe burning of the chest and severe migraines. He can feel the pressure under his ribs with eating which he feels is due to the hernia. Today he presents with his wife Maurilio who assists with the history taking. Reports trying diet and lifestyle changes to address his acid reflux with no improvement. He is now scheduled for upper endoscopy robot assisted hiatal hernia repair with fundoplication on 2/12/24 with Dr. Evangelista pending medical, cardiac and neurology clearance.

## 2024-01-24 NOTE — H&P PST ADULT - PROBLEM SELECTOR PLAN 1
Labs and EKG performed  Scheduled for upper endoscopy robot assisted hiatal hernia repair with fundoplication on 2/12/24 with Dr. Evangelista pending medical, cardiac and neurology clearance.   Written and verbal instructions provided.  Patient educated on surgical scrub, preadmission instructions, clearance and day of procedure medications, verbalizes understanding.  Patient instructed to stop vitamins/supplements/herbal medications/NSAIDS for one week prior to surgery and discuss with PMD, verbalized understanding.  Asked the patient to consult with cardiologist about holding ASA and the patient  agreed.  Patient verbalized understanding of instructions and was given the opportunity to ask questions and have them answered.  Out patient medications reviewed  and verified with patient.

## 2024-02-05 ENCOUNTER — APPOINTMENT (OUTPATIENT)
Dept: OTOLARYNGOLOGY | Facility: CLINIC | Age: 79
End: 2024-02-05
Payer: MEDICARE

## 2024-02-05 VITALS
WEIGHT: 128 LBS | HEART RATE: 56 BPM | SYSTOLIC BLOOD PRESSURE: 131 MMHG | BODY MASS INDEX: 20.57 KG/M2 | DIASTOLIC BLOOD PRESSURE: 73 MMHG | HEIGHT: 66 IN

## 2024-02-05 DIAGNOSIS — K13.79 OTHER LESIONS OF ORAL MUCOSA: ICD-10-CM

## 2024-02-05 DIAGNOSIS — H93.12 TINNITUS, LEFT EAR: ICD-10-CM

## 2024-02-05 PROCEDURE — 99213 OFFICE O/P EST LOW 20 MIN: CPT

## 2024-02-05 NOTE — PHYSICAL EXAM
[] : septum deviated to the left [de-identified] : inflamed turbinates [de-identified] : elongated uvula [Normal] : no rashes

## 2024-02-05 NOTE — ASSESSMENT
[FreeTextEntry1] : Reviewed and reconciled medications, allergies, PMHx, PSHx, SocHx, FMHx.       physical exam: -deviated septum left -inflamed turbinates b/l -enlongated uvula     Plan: Follow up in 3 months     Case discussed with Dr. Hi independent

## 2024-02-05 NOTE — HISTORY OF PRESENT ILLNESS
[de-identified] :  Pt. with h/o myasthenia gravis, bilateral SNHL - wears bilateral amplification, watchman device, a-fib, loop recorder, sleep apnea and sudden left SNHL, reflux, s/p Left Trans-Tympanic Steroid Membrane Injection on 3/30/2020, 4/16/2020 and 5/12/2020 presents for ear evaluation as he has narrow ear canals. Patient states he is scheduled for surgery and wanted to make sure ears were okay prior to surgery.

## 2024-02-05 NOTE — CONSULT LETTER
[Dear  ___] : Dear  [unfilled], [Courtesy Letter:] : I had the pleasure of seeing your patient, [unfilled], in my office today. [Please see my note below.] : Please see my note below. [Referral Closing:] : Thank you very much for seeing this patient.  If you have any questions, please do not hesitate to contact me. [Sincerely,] : Sincerely, [FreeTextEntry3] : Rebel Mejia PA-C

## 2024-02-11 ENCOUNTER — TRANSCRIPTION ENCOUNTER (OUTPATIENT)
Age: 79
End: 2024-02-11

## 2024-02-12 ENCOUNTER — INPATIENT (INPATIENT)
Facility: HOSPITAL | Age: 79
LOS: 2 days | Discharge: HOME | DRG: 328 | End: 2024-02-15
Attending: THORACIC SURGERY (CARDIOTHORACIC VASCULAR SURGERY) | Admitting: THORACIC SURGERY (CARDIOTHORACIC VASCULAR SURGERY)
Payer: MEDICARE

## 2024-02-12 ENCOUNTER — APPOINTMENT (OUTPATIENT)
Dept: THORACIC SURGERY | Facility: HOSPITAL | Age: 79
End: 2024-02-12

## 2024-02-12 ENCOUNTER — RESULT REVIEW (OUTPATIENT)
Age: 79
End: 2024-02-12

## 2024-02-12 VITALS
WEIGHT: 136.69 LBS | TEMPERATURE: 98 F | SYSTOLIC BLOOD PRESSURE: 129 MMHG | HEIGHT: 65.98 IN | HEART RATE: 83 BPM | RESPIRATION RATE: 16 BRPM | DIASTOLIC BLOOD PRESSURE: 63 MMHG | OXYGEN SATURATION: 99 %

## 2024-02-12 DIAGNOSIS — Z98.890 OTHER SPECIFIED POSTPROCEDURAL STATES: Chronic | ICD-10-CM

## 2024-02-12 DIAGNOSIS — Z95.818 PRESENCE OF OTHER CARDIAC IMPLANTS AND GRAFTS: Chronic | ICD-10-CM

## 2024-02-12 DIAGNOSIS — K44.9 DIAPHRAGMATIC HERNIA WITHOUT OBSTRUCTION OR GANGRENE: ICD-10-CM

## 2024-02-12 DIAGNOSIS — H26.8 OTHER SPECIFIED CATARACT: Chronic | ICD-10-CM

## 2024-02-12 DIAGNOSIS — Z95.828 PRESENCE OF OTHER VASCULAR IMPLANTS AND GRAFTS: Chronic | ICD-10-CM

## 2024-02-12 LAB
ABO RH CONFIRMATION: SIGNIFICANT CHANGE UP
ALBUMIN SERPL ELPH-MCNC: 3.6 G/DL — SIGNIFICANT CHANGE UP (ref 3.3–5.2)
ALP SERPL-CCNC: 65 U/L — SIGNIFICANT CHANGE UP (ref 40–120)
ALT FLD-CCNC: 35 U/L — SIGNIFICANT CHANGE UP
ANION GAP SERPL CALC-SCNC: 14 MMOL/L — SIGNIFICANT CHANGE UP (ref 5–17)
APTT BLD: 23.7 SEC — LOW (ref 24.5–35.6)
AST SERPL-CCNC: 35 U/L — SIGNIFICANT CHANGE UP
BILIRUB DIRECT SERPL-MCNC: 0.1 MG/DL — SIGNIFICANT CHANGE UP (ref 0–0.3)
BILIRUB INDIRECT FLD-MCNC: 0.2 MG/DL — SIGNIFICANT CHANGE UP (ref 0.2–1)
BILIRUB SERPL-MCNC: 0.3 MG/DL — LOW (ref 0.4–2)
BUN SERPL-MCNC: 14.4 MG/DL — SIGNIFICANT CHANGE UP (ref 8–20)
CALCIUM SERPL-MCNC: 9 MG/DL — SIGNIFICANT CHANGE UP (ref 8.4–10.5)
CHLORIDE SERPL-SCNC: 104 MMOL/L — SIGNIFICANT CHANGE UP (ref 96–108)
CO2 SERPL-SCNC: 21 MMOL/L — LOW (ref 22–29)
CREAT SERPL-MCNC: 0.61 MG/DL — SIGNIFICANT CHANGE UP (ref 0.5–1.3)
EGFR: 99 ML/MIN/1.73M2 — SIGNIFICANT CHANGE UP
GLUCOSE SERPL-MCNC: 155 MG/DL — HIGH (ref 70–99)
HCT VFR BLD CALC: 38.8 % — LOW (ref 39–50)
HGB BLD-MCNC: 12.6 G/DL — LOW (ref 13–17)
INR BLD: 1.15 RATIO — SIGNIFICANT CHANGE UP (ref 0.85–1.18)
MAGNESIUM SERPL-MCNC: 1.5 MG/DL — LOW (ref 1.6–2.6)
MCHC RBC-ENTMCNC: 30.7 PG — SIGNIFICANT CHANGE UP (ref 27–34)
MCHC RBC-ENTMCNC: 32.5 GM/DL — SIGNIFICANT CHANGE UP (ref 32–36)
MCV RBC AUTO: 94.6 FL — SIGNIFICANT CHANGE UP (ref 80–100)
PHOSPHATE SERPL-MCNC: 3.5 MG/DL — SIGNIFICANT CHANGE UP (ref 2.4–4.7)
PLATELET # BLD AUTO: 240 K/UL — SIGNIFICANT CHANGE UP (ref 150–400)
POTASSIUM SERPL-MCNC: 3.7 MMOL/L — SIGNIFICANT CHANGE UP (ref 3.5–5.3)
POTASSIUM SERPL-SCNC: 3.7 MMOL/L — SIGNIFICANT CHANGE UP (ref 3.5–5.3)
PROT SERPL-MCNC: 5.8 G/DL — LOW (ref 6.6–8.7)
PROTHROM AB SERPL-ACNC: 12.7 SEC — SIGNIFICANT CHANGE UP (ref 9.5–13)
RBC # BLD: 4.1 M/UL — LOW (ref 4.2–5.8)
RBC # FLD: 13.9 % — SIGNIFICANT CHANGE UP (ref 10.3–14.5)
SODIUM SERPL-SCNC: 139 MMOL/L — SIGNIFICANT CHANGE UP (ref 135–145)
WBC # BLD: 10.47 K/UL — SIGNIFICANT CHANGE UP (ref 3.8–10.5)
WBC # FLD AUTO: 10.47 K/UL — SIGNIFICANT CHANGE UP (ref 3.8–10.5)

## 2024-02-12 PROCEDURE — 43281 LAP PARAESOPHAG HERN REPAIR: CPT

## 2024-02-12 PROCEDURE — 43281 LAP PARAESOPHAG HERN REPAIR: CPT | Mod: AS

## 2024-02-12 PROCEDURE — 71045 X-RAY EXAM CHEST 1 VIEW: CPT | Mod: 26,77

## 2024-02-12 PROCEDURE — 88302 TISSUE EXAM BY PATHOLOGIST: CPT | Mod: 26

## 2024-02-12 PROCEDURE — 99291 CRITICAL CARE FIRST HOUR: CPT

## 2024-02-12 PROCEDURE — 43200 ESOPHAGOSCOPY FLEXIBLE BRUSH: CPT

## 2024-02-12 PROCEDURE — S2900 ROBOTIC SURGICAL SYSTEM: CPT | Mod: NC

## 2024-02-12 PROCEDURE — 71045 X-RAY EXAM CHEST 1 VIEW: CPT | Mod: 26

## 2024-02-12 DEVICE — SURGICEL FIBRILLAR 4 X 4": Type: IMPLANTABLE DEVICE | Status: FUNCTIONAL

## 2024-02-12 DEVICE — LIGATING CLIPS WECK HEMOLOK POLYMER LARGE (PURPLE) 6: Type: IMPLANTABLE DEVICE | Status: FUNCTIONAL

## 2024-02-12 RX ORDER — ONDANSETRON 8 MG/1
4 TABLET, FILM COATED ORAL ONCE
Refills: 0 | Status: DISCONTINUED | OUTPATIENT
Start: 2024-02-12 | End: 2024-02-12

## 2024-02-12 RX ORDER — PYRIDOSTIGMINE BROMIDE 60 MG/5ML
60 SOLUTION ORAL ONCE
Refills: 0 | Status: COMPLETED | OUTPATIENT
Start: 2024-02-12 | End: 2024-02-12

## 2024-02-12 RX ORDER — VANCOMYCIN HCL 1 G
1000 VIAL (EA) INTRAVENOUS ONCE
Refills: 0 | Status: DISCONTINUED | OUTPATIENT
Start: 2024-02-12 | End: 2024-02-12

## 2024-02-12 RX ORDER — PREDNISOLONE SODIUM PHOSPHATE 1 %
1 DROPS OPHTHALMIC (EYE)
Refills: 0 | Status: DISCONTINUED | OUTPATIENT
Start: 2024-02-12 | End: 2024-02-15

## 2024-02-12 RX ORDER — HEPARIN SODIUM 5000 [USP'U]/ML
5000 INJECTION INTRAVENOUS; SUBCUTANEOUS ONCE
Refills: 0 | Status: DISCONTINUED | OUTPATIENT
Start: 2024-02-12 | End: 2024-02-12

## 2024-02-12 RX ORDER — TAMSULOSIN HYDROCHLORIDE 0.4 MG/1
0.4 CAPSULE ORAL AT BEDTIME
Refills: 0 | Status: DISCONTINUED | OUTPATIENT
Start: 2024-02-12 | End: 2024-02-15

## 2024-02-12 RX ORDER — FENTANYL CITRATE 50 UG/ML
25 INJECTION INTRAVENOUS
Refills: 0 | Status: DISCONTINUED | OUTPATIENT
Start: 2024-02-12 | End: 2024-02-12

## 2024-02-12 RX ORDER — LATANOPROST 0.05 MG/ML
1 SOLUTION/ DROPS OPHTHALMIC; TOPICAL AT BEDTIME
Refills: 0 | Status: DISCONTINUED | OUTPATIENT
Start: 2024-02-12 | End: 2024-02-15

## 2024-02-12 RX ORDER — MAGNESIUM SULFATE 500 MG/ML
2 VIAL (ML) INJECTION ONCE
Refills: 0 | Status: COMPLETED | OUTPATIENT
Start: 2024-02-12 | End: 2024-02-12

## 2024-02-12 RX ORDER — METOCLOPRAMIDE HCL 10 MG
10 TABLET ORAL THREE TIMES A DAY
Refills: 0 | Status: DISCONTINUED | OUTPATIENT
Start: 2024-02-12 | End: 2024-02-15

## 2024-02-12 RX ORDER — HEPARIN SODIUM 5000 [USP'U]/ML
5000 INJECTION INTRAVENOUS; SUBCUTANEOUS EVERY 8 HOURS
Refills: 0 | Status: DISCONTINUED | OUTPATIENT
Start: 2024-02-12 | End: 2024-02-15

## 2024-02-12 RX ORDER — MYCOPHENOLATE MOFETIL 250 MG/1
1000 CAPSULE ORAL
Refills: 0 | Status: DISCONTINUED | OUTPATIENT
Start: 2024-02-12 | End: 2024-02-15

## 2024-02-12 RX ORDER — PANTOPRAZOLE SODIUM 20 MG/1
40 TABLET, DELAYED RELEASE ORAL DAILY
Refills: 0 | Status: DISCONTINUED | OUTPATIENT
Start: 2024-02-12 | End: 2024-02-14

## 2024-02-12 RX ORDER — POTASSIUM CHLORIDE 20 MEQ
10 PACKET (EA) ORAL
Refills: 0 | Status: COMPLETED | OUTPATIENT
Start: 2024-02-12 | End: 2024-02-12

## 2024-02-12 RX ORDER — PYRIDOSTIGMINE BROMIDE 60 MG/5ML
60 SOLUTION ORAL
Refills: 0 | Status: DISCONTINUED | OUTPATIENT
Start: 2024-02-12 | End: 2024-02-15

## 2024-02-12 RX ORDER — HYDROMORPHONE HYDROCHLORIDE 2 MG/ML
0.5 INJECTION INTRAMUSCULAR; INTRAVENOUS; SUBCUTANEOUS
Refills: 0 | Status: DISCONTINUED | OUTPATIENT
Start: 2024-02-12 | End: 2024-02-13

## 2024-02-12 RX ORDER — SODIUM CHLORIDE 9 MG/ML
1000 INJECTION, SOLUTION INTRAVENOUS
Refills: 0 | Status: DISCONTINUED | OUTPATIENT
Start: 2024-02-12 | End: 2024-02-13

## 2024-02-12 RX ORDER — BUPIVACAINE 13.3 MG/ML
20 INJECTION, SUSPENSION, LIPOSOMAL INFILTRATION ONCE
Refills: 0 | Status: DISCONTINUED | OUTPATIENT
Start: 2024-02-12 | End: 2024-02-12

## 2024-02-12 RX ORDER — ONDANSETRON 8 MG/1
4 TABLET, FILM COATED ORAL THREE TIMES A DAY
Refills: 0 | Status: DISCONTINUED | OUTPATIENT
Start: 2024-02-12 | End: 2024-02-15

## 2024-02-12 RX ADMIN — Medication 10 MILLIGRAM(S): at 22:27

## 2024-02-12 RX ADMIN — SODIUM CHLORIDE 3 MILLILITER(S): 9 INJECTION INTRAMUSCULAR; INTRAVENOUS; SUBCUTANEOUS at 22:04

## 2024-02-12 RX ADMIN — ONDANSETRON 4 MILLIGRAM(S): 8 TABLET, FILM COATED ORAL at 22:26

## 2024-02-12 RX ADMIN — ONDANSETRON 4 MILLIGRAM(S): 8 TABLET, FILM COATED ORAL at 14:34

## 2024-02-12 RX ADMIN — MYCOPHENOLATE MOFETIL 1000 MILLIGRAM(S): 250 CAPSULE ORAL at 18:30

## 2024-02-12 RX ADMIN — HEPARIN SODIUM 5000 UNIT(S): 5000 INJECTION INTRAVENOUS; SUBCUTANEOUS at 14:39

## 2024-02-12 RX ADMIN — Medication 100 MILLIEQUIVALENT(S): at 18:31

## 2024-02-12 RX ADMIN — LATANOPROST 1 DROP(S): 0.05 SOLUTION/ DROPS OPHTHALMIC; TOPICAL at 22:28

## 2024-02-12 RX ADMIN — Medication 25 GRAM(S): at 14:39

## 2024-02-12 RX ADMIN — TAMSULOSIN HYDROCHLORIDE 0.4 MILLIGRAM(S): 0.4 CAPSULE ORAL at 22:25

## 2024-02-12 RX ADMIN — Medication 100 MILLIEQUIVALENT(S): at 14:40

## 2024-02-12 RX ADMIN — Medication 100 MILLIEQUIVALENT(S): at 16:04

## 2024-02-12 RX ADMIN — PYRIDOSTIGMINE BROMIDE 60 MILLIGRAM(S): 60 SOLUTION ORAL at 22:25

## 2024-02-12 RX ADMIN — PYRIDOSTIGMINE BROMIDE 60 MILLIGRAM(S): 60 SOLUTION ORAL at 13:24

## 2024-02-12 RX ADMIN — SODIUM CHLORIDE 75 MILLILITER(S): 9 INJECTION, SOLUTION INTRAVENOUS at 13:23

## 2024-02-12 RX ADMIN — PANTOPRAZOLE SODIUM 40 MILLIGRAM(S): 20 TABLET, DELAYED RELEASE ORAL at 18:30

## 2024-02-12 RX ADMIN — HEPARIN SODIUM 5000 UNIT(S): 5000 INJECTION INTRAVENOUS; SUBCUTANEOUS at 22:28

## 2024-02-12 RX ADMIN — Medication 10 MILLIGRAM(S): at 14:39

## 2024-02-12 RX ADMIN — PYRIDOSTIGMINE BROMIDE 60 MILLIGRAM(S): 60 SOLUTION ORAL at 18:30

## 2024-02-12 RX ADMIN — SODIUM CHLORIDE 3 MILLILITER(S): 9 INJECTION INTRAMUSCULAR; INTRAVENOUS; SUBCUTANEOUS at 14:46

## 2024-02-12 NOTE — BRIEF OPERATIVE NOTE - OPERATION/FINDINGS
Hiatal hernia with hernia SAC, repaired with Toupet Fundo. Left pleura violated with evacuation of PTX confirmed by CXR

## 2024-02-12 NOTE — PROGRESS NOTE ADULT - SUBJECTIVE AND OBJECTIVE BOX
SAL SUTTON  MRN-[id number]    HPI:  Patient is a 78 year old male presenting today for PST, PMH includes TIA, a-fib, anemia, esophageal dysmotility (and stricture vs Schatzki ring s/p esophageal dilation x 3 due to hiccups, bubble and vomiting - Minesh Solares [place]), GERD, Myasthenia Gravis. Patient c/o acid reflux and diarrhea. He reports severe burning of the chest and severe migraines. He can feel the pressure under his ribs with eating which he feels is due to the hernia. Today he presents with his wife Maurilio who assists with the history taking. Reports trying diet and lifestyle changes to address his acid reflux with no improvement. He is now scheduled for upper endoscopy robot assisted hiatal hernia repair with fundoplication on 2/12/24 with Dr. Evangelista pending medical, cardiac and neurology clearance.  (24 Jan 2024 12:26)      Surgery/Hospital Course:  ·  PRE-OP DIAGNOSIS:  Hiatal hernia with GERD and esophagitis   ·  POST-OP DIAGNOSIS:  Hiatal hernia with GERD and esophagitis   ·  PROCEDURES:  Nissen fundoplication, laparoscopic, robotic assisted 12-Feb-2024   Today:  No acute events     ICU Vital Signs Last 24 Hrs  T(C): 36.5 (12 Feb 2024 06:34), Max: 36.5 (12 Feb 2024 06:34)  T(F): 97.7 (12 Feb 2024 06:34), Max: 97.7 (12 Feb 2024 06:34)  HR: 83 (12 Feb 2024 06:34) (83 - 83)  BP: 129/63 (12 Feb 2024 06:34) (129/63 - 129/63)  BP(mean): --  ABP: --  ABP(mean): --  RR: 16 (12 Feb 2024 06:34) (16 - 16)  SpO2: 99% (12 Feb 2024 06:34) (99% - 99%)    O2 Parameters below as of 12 Feb 2024 06:34  Patient On (Oxygen Delivery Method): room air            Physical Exam:  Gen: A&O   CNS: non focal 	  Neck: no JVD  RES : clear , no wheezing              CVS: Regular  rhythm. Normal S1/S2  Abd: Soft, non-distended. Bowel sounds present.  Skin: No rash.  Ext:  no edema    ============================I/O===========================   I&O's Detail    ============================ LABS =========================                  ======================Micro/Rad/Cardio=================  Culture: Reviewed   CXR: Reviewed  Echo:Reviewed  ======================================================  PAST MEDICAL & SURGICAL HISTORY:  HLD (hyperlipidemia)      GERD (gastroesophageal reflux disease)      TIA (transient ischemic attack)  2006      Atrial fibrillation, unspecified type      Iron deficiency anemia      Nasal polyp      Benign prostatic hyperplasia, presence of lower urinary tract symptoms unspecified, unspecified morphology      Osteopenia      Glaucoma      Herpes virus disease  right eye      History of myasthenia gravis  2016 , no weakness, in remission      Right hip pain  avasular necrosis of right femoral head 2018      Other cataract  right      H/O nasal polypectomy  wears nasal strip for better breathing      H/O coronary angiogram  3/2012 no stents      Status post placement of implantable loop recorder  4/2018 left chest      Central venous catheter in place, temporary  vortex port implant on right chest      H/O cardiac radiofrequency ablation  May 2018      H/O colonoscopy      H/O endoscopy        ====================ASSESSMENT ==============  - 78 year old male presenting today for PST, PMH includes TIA, a-fib, anemia, esophageal dysmotility (and stricture vs Schatzki ring s/p esophageal dilation x 3 due to hiccups, bubble and vomiting - Minesh Solares [place]), GERD, Myasthenia Gravis.   -Patient c/o acid reflux and diarrhea. He reports severe burning of the chest and severe migraines. He can feel the pressure under his ribs with eating which he feels is due to the hernia.       ---HLD (hyperlipidemia)  ---GERD (gastroesophageal reflux disease)  ---TIA (transient ischemic attack)  ---Atrial fibrillation, unspecified type  ---Benign prostatic hyperplasia, presence of lower urinary tract symptoms unspecified, unspecified morphology  ---History of myasthenia gravis  ---H/O coronary angiogram  ---H/O cardiac radiofrequency ablation  ---Hiatal hernia with GERD and esophagitis   ---S/p Nissen fundoplication, laparoscopic, robotic assisted 12-Feb-2024   ---Post op Hypovolemia  ---Post op respiratory insufficiency       Plan:  ====================== NEUROLOGY=====================    ==================== RESPIRATORY======================  Post op respiratory insufficiency  Mechanical Ventilation:      ====================CARDIOVASCULAR==================  Post op Hypovolemia    ===================HEMATOLOGIC/ONC ===================  Monitor H&H/Plts    heparin   Injectable 5000 Unit(s) SubCutaneous every 8 hours    ===================== RENAL =========================  Continue monitoring urine output, I&OS, BUN/Cr   tamsulosin 0.4 milliGRAM(s) Oral at bedtime    ==================== GASTROINTESTINAL===================    =======================    ENDOCRINE  =====================    ========================INFECTIOUS DISEASE================      -Close hemodynamic , ventilatory and drain monitoring and management per post op routine .  -Monitor Neurologic status ,   -Head of the bed should remain elevated to 45 degrees,  -Monitor adequacy of oxygenation and ventilation and attempt to wean oxygen ,  -Monitor for arrhythmias and monitor parameters for organ perfusion,  -Glycemic control is satisfactory,  -Nutritional goals will be met using po eventually , insure adequate caloric intake and monitor the same ,  -Electrolytes have been repleted as necessary , pain control has been achieved  and wound care has been carried out ,  -Stress ulcer and VTE prophylaxis will be achieved,  -Agressive PT and early mobility and ambulation goals will be met,  -The family was updated about the course and plan .      I have spent 35 minutes providing acute care for this critically ill patient     Patient requires continuous monitoring with bedside rhythm monitoring, pulse ox monitoring, and intermittent blood gas analysis. Care plan discussed with ICU care team. Patient remained critical and at risk for life threatening decompensation.

## 2024-02-12 NOTE — BRIEF OPERATIVE NOTE - NSICDXBRIEFPROCEDURE_GEN_ALL_CORE_FT
PROCEDURES:  Nissen fundoplication, laparoscopic, robotic assisted 12-Feb-2024 11:35:44  Otto Alvarado

## 2024-02-13 DIAGNOSIS — K21.9 GASTRO-ESOPHAGEAL REFLUX DISEASE WITHOUT ESOPHAGITIS: ICD-10-CM

## 2024-02-13 DIAGNOSIS — G70.00 MYASTHENIA GRAVIS WITHOUT (ACUTE) EXACERBATION: ICD-10-CM

## 2024-02-13 LAB
ANION GAP SERPL CALC-SCNC: 12 MMOL/L — SIGNIFICANT CHANGE UP (ref 5–17)
APTT BLD: 28.5 SEC — SIGNIFICANT CHANGE UP (ref 24.5–35.6)
BUN SERPL-MCNC: 10.7 MG/DL — SIGNIFICANT CHANGE UP (ref 8–20)
CALCIUM SERPL-MCNC: 8.7 MG/DL — SIGNIFICANT CHANGE UP (ref 8.4–10.5)
CHLORIDE SERPL-SCNC: 103 MMOL/L — SIGNIFICANT CHANGE UP (ref 96–108)
CO2 SERPL-SCNC: 24 MMOL/L — SIGNIFICANT CHANGE UP (ref 22–29)
CREAT SERPL-MCNC: 0.68 MG/DL — SIGNIFICANT CHANGE UP (ref 0.5–1.3)
EGFR: 96 ML/MIN/1.73M2 — SIGNIFICANT CHANGE UP
GLUCOSE SERPL-MCNC: 99 MG/DL — SIGNIFICANT CHANGE UP (ref 70–99)
HCT VFR BLD CALC: 35.7 % — LOW (ref 39–50)
HGB BLD-MCNC: 12.4 G/DL — LOW (ref 13–17)
INR BLD: 1.24 RATIO — HIGH (ref 0.85–1.18)
MAGNESIUM SERPL-MCNC: 1.8 MG/DL — SIGNIFICANT CHANGE UP (ref 1.6–2.6)
MCHC RBC-ENTMCNC: 31.9 PG — SIGNIFICANT CHANGE UP (ref 27–34)
MCHC RBC-ENTMCNC: 34.7 GM/DL — SIGNIFICANT CHANGE UP (ref 32–36)
MCV RBC AUTO: 91.8 FL — SIGNIFICANT CHANGE UP (ref 80–100)
PLATELET # BLD AUTO: 243 K/UL — SIGNIFICANT CHANGE UP (ref 150–400)
POTASSIUM SERPL-MCNC: 4.5 MMOL/L — SIGNIFICANT CHANGE UP (ref 3.5–5.3)
POTASSIUM SERPL-SCNC: 4.5 MMOL/L — SIGNIFICANT CHANGE UP (ref 3.5–5.3)
PROTHROM AB SERPL-ACNC: 13.7 SEC — HIGH (ref 9.5–13)
RBC # BLD: 3.89 M/UL — LOW (ref 4.2–5.8)
RBC # FLD: 13.9 % — SIGNIFICANT CHANGE UP (ref 10.3–14.5)
SODIUM SERPL-SCNC: 139 MMOL/L — SIGNIFICANT CHANGE UP (ref 135–145)
WBC # BLD: 9.59 K/UL — SIGNIFICANT CHANGE UP (ref 3.8–10.5)
WBC # FLD AUTO: 9.59 K/UL — SIGNIFICANT CHANGE UP (ref 3.8–10.5)

## 2024-02-13 PROCEDURE — 71045 X-RAY EXAM CHEST 1 VIEW: CPT | Mod: 26

## 2024-02-13 PROCEDURE — 71250 CT THORAX DX C-: CPT | Mod: 26

## 2024-02-13 PROCEDURE — 99024 POSTOP FOLLOW-UP VISIT: CPT

## 2024-02-13 PROCEDURE — 99291 CRITICAL CARE FIRST HOUR: CPT

## 2024-02-13 RX ORDER — TAMSULOSIN HYDROCHLORIDE 0.4 MG/1
0.4 CAPSULE ORAL AT BEDTIME
Refills: 0 | Status: DISCONTINUED | OUTPATIENT
Start: 2024-02-13 | End: 2024-02-13

## 2024-02-13 RX ORDER — SENNA PLUS 8.6 MG/1
2 TABLET ORAL AT BEDTIME
Refills: 0 | Status: DISCONTINUED | OUTPATIENT
Start: 2024-02-13 | End: 2024-02-15

## 2024-02-13 RX ORDER — MAGNESIUM SULFATE 500 MG/ML
2 VIAL (ML) INJECTION ONCE
Refills: 0 | Status: COMPLETED | OUTPATIENT
Start: 2024-02-13 | End: 2024-02-13

## 2024-02-13 RX ORDER — ASPIRIN/CALCIUM CARB/MAGNESIUM 324 MG
81 TABLET ORAL DAILY
Refills: 0 | Status: DISCONTINUED | OUTPATIENT
Start: 2024-02-13 | End: 2024-02-15

## 2024-02-13 RX ORDER — GABAPENTIN 400 MG/1
200 CAPSULE ORAL AT BEDTIME
Refills: 0 | Status: DISCONTINUED | OUTPATIENT
Start: 2024-02-13 | End: 2024-02-15

## 2024-02-13 RX ADMIN — PYRIDOSTIGMINE BROMIDE 60 MILLIGRAM(S): 60 SOLUTION ORAL at 07:19

## 2024-02-13 RX ADMIN — ONDANSETRON 4 MILLIGRAM(S): 8 TABLET, FILM COATED ORAL at 07:20

## 2024-02-13 RX ADMIN — SODIUM CHLORIDE 3 MILLILITER(S): 9 INJECTION INTRAMUSCULAR; INTRAVENOUS; SUBCUTANEOUS at 04:53

## 2024-02-13 RX ADMIN — MYCOPHENOLATE MOFETIL 1000 MILLIGRAM(S): 250 CAPSULE ORAL at 17:40

## 2024-02-13 RX ADMIN — ONDANSETRON 4 MILLIGRAM(S): 8 TABLET, FILM COATED ORAL at 21:06

## 2024-02-13 RX ADMIN — Medication 10 MILLIGRAM(S): at 07:20

## 2024-02-13 RX ADMIN — SODIUM CHLORIDE 3 MILLILITER(S): 9 INJECTION INTRAMUSCULAR; INTRAVENOUS; SUBCUTANEOUS at 20:47

## 2024-02-13 RX ADMIN — Medication 25 GRAM(S): at 07:20

## 2024-02-13 RX ADMIN — MYCOPHENOLATE MOFETIL 1000 MILLIGRAM(S): 250 CAPSULE ORAL at 12:18

## 2024-02-13 RX ADMIN — Medication 10 MILLIGRAM(S): at 15:00

## 2024-02-13 RX ADMIN — PYRIDOSTIGMINE BROMIDE 60 MILLIGRAM(S): 60 SOLUTION ORAL at 15:31

## 2024-02-13 RX ADMIN — Medication 10 MILLIGRAM(S): at 20:58

## 2024-02-13 RX ADMIN — PYRIDOSTIGMINE BROMIDE 60 MILLIGRAM(S): 60 SOLUTION ORAL at 11:28

## 2024-02-13 RX ADMIN — GABAPENTIN 200 MILLIGRAM(S): 400 CAPSULE ORAL at 20:57

## 2024-02-13 RX ADMIN — LATANOPROST 1 DROP(S): 0.05 SOLUTION/ DROPS OPHTHALMIC; TOPICAL at 20:57

## 2024-02-13 RX ADMIN — HEPARIN SODIUM 5000 UNIT(S): 5000 INJECTION INTRAVENOUS; SUBCUTANEOUS at 07:20

## 2024-02-13 RX ADMIN — HEPARIN SODIUM 5000 UNIT(S): 5000 INJECTION INTRAVENOUS; SUBCUTANEOUS at 20:57

## 2024-02-13 RX ADMIN — HEPARIN SODIUM 5000 UNIT(S): 5000 INJECTION INTRAVENOUS; SUBCUTANEOUS at 15:00

## 2024-02-13 RX ADMIN — TAMSULOSIN HYDROCHLORIDE 0.4 MILLIGRAM(S): 0.4 CAPSULE ORAL at 20:56

## 2024-02-13 RX ADMIN — PANTOPRAZOLE SODIUM 40 MILLIGRAM(S): 20 TABLET, DELAYED RELEASE ORAL at 12:17

## 2024-02-13 RX ADMIN — ONDANSETRON 4 MILLIGRAM(S): 8 TABLET, FILM COATED ORAL at 15:00

## 2024-02-13 RX ADMIN — PYRIDOSTIGMINE BROMIDE 60 MILLIGRAM(S): 60 SOLUTION ORAL at 19:38

## 2024-02-13 RX ADMIN — SODIUM CHLORIDE 3 MILLILITER(S): 9 INJECTION INTRAMUSCULAR; INTRAVENOUS; SUBCUTANEOUS at 13:11

## 2024-02-13 NOTE — PROGRESS NOTE ADULT - SUBJECTIVE AND OBJECTIVE BOX
Subjective - patient seen and evaluated bedside. Sitting comfortably in bed. Complains of minimal pain at incision sites. Denies CP, SOB, HA, dizziness, n/v/d    Review of Systems: negative x 10 systems except as noted above    Brief summary:  78yMale POD# 1 robotic hiatal hernia repair w/ toupet fundoplication    Significant/[id number] events: as above      PAST MEDICAL & SURGICAL HISTORY:  HLD (hyperlipidemia)      GERD (gastroesophageal reflux disease)      TIA (transient ischemic attack)  2006      Atrial fibrillation, unspecified type      Iron deficiency anemia      Nasal polyp      Benign prostatic hyperplasia, presence of lower urinary tract symptoms unspecified, unspecified morphology      Osteopenia      Glaucoma      Herpes virus disease  right eye      History of myasthenia gravis  2016 , no weakness, in remission      Right hip pain  avasular necrosis of right femoral head 2018      Other cataract  right      H/O nasal polypectomy  wears nasal strip for better breathing      H/O coronary angiogram  3/2012 no stents      Status post placement of implantable loop recorder  4/2018 left chest      Central venous catheter in place, temporary  vortex port implant on right chest      H/O cardiac radiofrequency ablation  May 2018      H/O colonoscopy      H/O endoscopy            heparin   Injectable 5000 Unit(s) SubCutaneous every 8 hours  HYDROmorphone  Injectable 0.5 milliGRAM(s) IV Push every 10 minutes PRN  lactated ringers. 1000 milliLiter(s) IV Continuous <Continuous>  latanoprost 0.005% Ophthalmic Solution 1 Drop(s) Both EYES at bedtime  metoclopramide Injectable 10 milliGRAM(s) IV Push three times a day  mycophenolate mofetil 1000 milliGRAM(s) Oral two times a day  ondansetron Injectable 4 milliGRAM(s) IV Push three times a day  pantoprazole  Injectable 40 milliGRAM(s) IV Push daily  prednisoLONE acetate 1% Suspension 1 Drop(s) Both EYES <User Schedule>  pyridostigmine 60 milliGRAM(s) Oral <User Schedule>  sodium chloride 0.9% lock flush 3 milliLiter(s) IV Push every 8 hours  tamsulosin 0.4 milliGRAM(s) Oral at bedtime  MEDICATIONS  (PRN):  HYDROmorphone  Injectable 0.5 milliGRAM(s) IV Push every 10 minutes PRN Severe Pain (7 - 10)    Height (cm): 167.6 (02-12 @ 06:34)  Weight (kg): 61.961 (02-12 @ 06:34)  BMI (kg/m2): 22.1 (02-12 @ 06:34)  BSA (m2): 1.7 (02-12 @ 06:34)  Daily Height in cm: 167.6 (12 Feb 2024 06:34)    Daily                               12.6   10.47 )-----------( 240      ( 12 Feb 2024 13:15 )             38.8   02-12    139  |  104  |  14.4  ----------------------------<  155<H>  3.7   |  21.0<L>  |  0.61    Ca    9.0      12 Feb 2024 13:15  Phos  3.5     02-12  Mg     1.5     02-12    TPro  5.8<L>  /  Alb  3.6  /  TBili  0.3<L>  /  DBili  0.1  /  AST  35  /  ALT  35  /  AlkPhos  65  02-12      PT/INR - ( 12 Feb 2024 13:15 )   PT: 12.7 sec;   INR: 1.15 ratio         PTT - ( 12 Feb 2024 13:15 )  PTT:23.7 sec      Objective:  T(C): 37 (02-12-24 @ 20:00), Max: 37 (02-12-24 @ 20:00)  HR: 88 (02-12-24 @ 23:00) (70 - 95)  BP: 124/64 (02-12-24 @ 23:00) (120/62 - 168/84)  RR: 14 (02-12-24 @ 23:00) (10 - 34)  SpO2: 93% (02-12-24 @ 23:00) (93% - 100%)  Wt(kg): --CAPILLARY BLOOD GLUCOSE      I&O's Summary    12 Feb 2024 07:01  -  13 Feb 2024 00:44  --------------------------------------------------------  IN: 1490 mL / OUT: 2000 mL / NET: -510 mL        Physical Exam  General: NAD  Neuro: A+O x 3, non-focal, speech clear and intact  Psych: Appropriate affect  HEENT:  NCAT, No conjuctival edema or icterus, no thrush.  Pulm: CTA, equal bilaterally  CV: RRR, +S1S2  Abd: soft, NT, ND, +BS  Ext: +DP Pulses b/l, no edema  Skin: Warm, dry, intact  Inc: abd incision sites c/d/i, no signs of infction or bleeding      Imaging:    < from: Xray Esophagram Single Contrast (11.02.23 @ 09:42) >  IMPRESSION:    No gastroesophageal reflux.    Small hiatal hernia with normal mucosa and a 1 cm segment of smooth   narrowing of the distal esophagus to 0.5 cm, just proximal to the hernia.    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

## 2024-02-13 NOTE — PROGRESS NOTE ADULT - SUBJECTIVE AND OBJECTIVE BOX
SAL SUTTON  [id number]    HPI:  Patient is a 78 year old male presenting today for PST, PMH includes TIA, a-fib, anemia, esophageal dysmotility (and stricture vs Schatzki ring s/p esophageal dilation x 3 due to hiccups, bubble and vomiting - Minesh Solares [place]), GERD, Myasthenia Gravis. Patient c/o acid reflux and diarrhea. He reports severe burning of the chest and severe migraines. He can feel the pressure under his ribs with eating which he feels is due to the hernia. Today he presents with his wife Maurilio who assists with the history taking. Reports trying diet and lifestyle changes to address his acid reflux with no improvement. He is now scheduled for upper endoscopy robot assisted hiatal hernia repair with fundoplication on 2/12/24 with Dr. Evangelista pending medical, cardiac and neurology clearance.  (24 Jan 2024 12:26)      Surgery/Hospital Course:  ·  PRE-OP DIAGNOSIS:  Hiatal hernia with GERD and esophagitis   ·  POST-OP DIAGNOSIS:  Hiatal hernia with GERD and esophagitis   ·  PROCEDURES:  Nissen fundoplication, laparoscopic, robotic assisted 12-Feb-2024   Today:  No acute events -    ICU Vital Signs Last 24 Hrs  T(C): 37.4 (13 Feb 2024 08:00), Max: 37.4 (13 Feb 2024 08:00)  T(F): 99.4 (13 Feb 2024 08:00), Max: 99.4 (13 Feb 2024 08:00)  HR: 68 (13 Feb 2024 10:00) (61 - 92)  BP: 119/73 (13 Feb 2024 10:00) (111/59 - 167/78)  BP(mean): 91 (13 Feb 2024 10:00) (78 - 112)  ABP: --  ABP(mean): --  RR: 23 (13 Feb 2024 10:00) (10 - 26)  SpO2: 94% (13 Feb 2024 10:00) (92% - 100%)    O2 Parameters below as of 13 Feb 2024 08:00  Patient On (Oxygen Delivery Method): room air            Physical Exam:  Gen: A&O   CNS: non focal 	  Neck: no JVD  RES : clear , no wheezing              CVS: Regular  rhythm. Normal S1/S2  Abd: Soft, non-distended. Bowel sounds present.  Skin: No rash.  Ext:  no edema    ============================I/O===========================   I&O's Detail    12 Feb 2024 07:01  -  13 Feb 2024 07:00  --------------------------------------------------------  IN:    IV PiggyBack: 350 mL    Lactated Ringers: 1425 mL    Oral Fluid: 240 mL  Total IN: 2015 mL    OUT:    Voided (mL): 2500 mL  Total OUT: 2500 mL    Total NET: -485 mL      13 Feb 2024 07:01  -  13 Feb 2024 11:43  --------------------------------------------------------  IN:    IV PiggyBack: 50 mL    Lactated Ringers: 225 mL    Oral Fluid: 60 mL  Total IN: 335 mL    OUT:    Voided (mL): 900 mL  Total OUT: 900 mL    Total NET: -565 mL        ============================ LABS =========================                        12.4   9.59  )-----------( 243      ( 13 Feb 2024 02:34 )             35.7     02-13    139  |  103  |  10.7  ----------------------------<  99  4.5   |  24.0  |  0.68    Ca    8.7      13 Feb 2024 03:15  Phos  3.5     02-12  Mg     1.8     02-13    TPro  5.8<L>  /  Alb  3.6  /  TBili  0.3<L>  /  DBili  0.1  /  AST  35  /  ALT  35  /  AlkPhos  65  02-12    LIVER FUNCTIONS - ( 12 Feb 2024 13:15 )  Alb: 3.6 g/dL / Pro: 5.8 g/dL / ALK PHOS: 65 U/L / ALT: 35 U/L / AST: 35 U/L / GGT: x           PT/INR - ( 13 Feb 2024 02:34 )   PT: 13.7 sec;   INR: 1.24 ratio         PTT - ( 13 Feb 2024 02:34 )  PTT:28.5 sec    Urinalysis Basic - ( 13 Feb 2024 03:15 )    Color: x / Appearance: x / SG: x / pH: x  Gluc: 99 mg/dL / Ketone: x  / Bili: x / Urobili: x   Blood: x / Protein: x / Nitrite: x   Leuk Esterase: x / RBC: x / WBC x   Sq Epi: x / Non Sq Epi: x / Bacteria: x      ======================Micro/Rad/Cardio=================  Culture: Reviewed   CXR: Reviewed  Echo:Reviewed  ======================================================  PAST MEDICAL & SURGICAL HISTORY:  HLD (hyperlipidemia)      GERD (gastroesophageal reflux disease)      TIA (transient ischemic attack)  2006      Atrial fibrillation, unspecified type      Iron deficiency anemia      Nasal polyp      Benign prostatic hyperplasia, presence of lower urinary tract symptoms unspecified, unspecified morphology      Osteopenia      Glaucoma      Herpes virus disease  right eye      History of myasthenia gravis  2016 , no weakness, in remission      Right hip pain  avasular necrosis of right femoral head 2018      Other cataract  right      H/O nasal polypectomy  wears nasal strip for better breathing      H/O coronary angiogram  3/2012 no stents      Status post placement of implantable loop recorder  4/2018 left chest      Central venous catheter in place, temporary  vortex port implant on right chest      H/O cardiac radiofrequency ablation  May 2018      H/O colonoscopy      H/O endoscopy        ====================ASSESSMENT ==============  - 78 year old male presenting today for PST, PMH includes TIA, a-fib, anemia, esophageal dysmotility (and stricture vs Schatzki ring s/p esophageal dilation x 3 due to hiccups, bubble and vomiting - Minesh Reyez [place]), GERD, Myasthenia Gravis.   -Patient c/o acid reflux and diarrhea. He reports severe burning of the chest and severe migraines. He can feel the pressure under his ribs with eating which he feels is due to the hernia.       ---HLD (hyperlipidemia)  ---GERD (gastroesophageal reflux disease)  ---TIA (transient ischemic attack)  ---Atrial fibrillation, unspecified type  ---Benign prostatic hyperplasia, presence of lower urinary tract symptoms unspecified, unspecified morphology  ---History of myasthenia gravis  ---H/O coronary angiogram  ---H/O cardiac radiofrequency ablation  ---Hiatal hernia with GERD and esophagitis   ---S/p Nissen fundoplication, laparoscopic, robotic assisted 12-Feb-2024   ---Post op Hypovolemia  ---Post op respiratory insufficiency       Plan:  -Pending CT esophagram this AM prior to advancing diet  -Continue protonix, reglan, zofran  -Chest PT and IS use with bedside nurse.  - Continue mestinon at home dose and frequency.  - HSQ and SCDs for DVT prophylaxis  - GI prophylaxis      ====================== NEUROLOGY=====================  HYDROmorphone  Injectable 0.5 milliGRAM(s) IV Push every 10 minutes PRN Severe Pain (7 - 10)  metoclopramide Injectable 10 milliGRAM(s) IV Push three times a day  ondansetron Injectable 4 milliGRAM(s) IV Push three times a day    ==================== RESPIRATORY======================  Post op respiratory insufficiency    ====================CARDIOVASCULAR==================  Post op Hypovolemia    ===================HEMATOLOGIC/ONC ===================  Monitor H&H/Plts    heparin   Injectable 5000 Unit(s) SubCutaneous every 8 hours    ===================== RENAL =========================  Continue monitoring urine output, I&OS, BUN/Cr   tamsulosin 0.4 milliGRAM(s) Oral at bedtime    ==================== GASTROINTESTINAL===================  lactated ringers. 1000 milliLiter(s) (75 mL/Hr) IV Continuous <Continuous>  pantoprazole  Injectable 40 milliGRAM(s) IV Push daily  sodium chloride 0.9% lock flush 3 milliLiter(s) IV Push every 8 hours    =======================    ENDOCRINE  =====================    ========================INFECTIOUS DISEASE================      -Monitor Neurologic status ,   -Head of the bed should remain elevated to 45 degrees,  -Monitor for arrhythmias and monitor parameters for organ perfusion,  -Glycemic control is satisfactory,  -Nutritional goals will be met using po eventually , insure adequate caloric intake and monitor the same ,  -Electrolytes have been repleted as necessary , pain control has been achieved  and wound care has been carried out ,  -Stress ulcer and VTE prophylaxis will be achieved,  -Agressive PT and early mobility and ambulation goals will be met,  -The family was updated about the course and plan .      I have spent 35 minutes providing acute care for this critically ill patient     Patient requires continuous monitoring with bedside rhythm monitoring, pulse ox monitoring, and intermittent blood gas analysis. Care plan discussed with ICU care team. Patient remained critical and at risk for life threatening decompensation.

## 2024-02-14 DIAGNOSIS — I48.91 UNSPECIFIED ATRIAL FIBRILLATION: ICD-10-CM

## 2024-02-14 LAB
ANION GAP SERPL CALC-SCNC: 12 MMOL/L — SIGNIFICANT CHANGE UP (ref 5–17)
APPEARANCE UR: ABNORMAL
BACTERIA # UR AUTO: NEGATIVE /HPF — SIGNIFICANT CHANGE UP
BILIRUB UR-MCNC: NEGATIVE — SIGNIFICANT CHANGE UP
BUN SERPL-MCNC: 10.8 MG/DL — SIGNIFICANT CHANGE UP (ref 8–20)
CALCIUM SERPL-MCNC: 8.4 MG/DL — SIGNIFICANT CHANGE UP (ref 8.4–10.5)
CAST: 0 /LPF — SIGNIFICANT CHANGE UP (ref 0–4)
CHLORIDE SERPL-SCNC: 103 MMOL/L — SIGNIFICANT CHANGE UP (ref 96–108)
CO2 SERPL-SCNC: 23 MMOL/L — SIGNIFICANT CHANGE UP (ref 22–29)
COLOR SPEC: YELLOW — SIGNIFICANT CHANGE UP
CREAT SERPL-MCNC: 0.65 MG/DL — SIGNIFICANT CHANGE UP (ref 0.5–1.3)
DIFF PNL FLD: ABNORMAL
EGFR: 97 ML/MIN/1.73M2 — SIGNIFICANT CHANGE UP
GLUCOSE SERPL-MCNC: 86 MG/DL — SIGNIFICANT CHANGE UP (ref 70–99)
GLUCOSE UR QL: NEGATIVE MG/DL — SIGNIFICANT CHANGE UP
HCT VFR BLD CALC: 39.8 % — SIGNIFICANT CHANGE UP (ref 39–50)
HGB BLD-MCNC: 13.2 G/DL — SIGNIFICANT CHANGE UP (ref 13–17)
KETONES UR-MCNC: NEGATIVE MG/DL — SIGNIFICANT CHANGE UP
LEUKOCYTE ESTERASE UR-ACNC: ABNORMAL
MAGNESIUM SERPL-MCNC: 1.9 MG/DL — SIGNIFICANT CHANGE UP (ref 1.6–2.6)
MCHC RBC-ENTMCNC: 31 PG — SIGNIFICANT CHANGE UP (ref 27–34)
MCHC RBC-ENTMCNC: 33.2 GM/DL — SIGNIFICANT CHANGE UP (ref 32–36)
MCV RBC AUTO: 93.4 FL — SIGNIFICANT CHANGE UP (ref 80–100)
NITRITE UR-MCNC: NEGATIVE — SIGNIFICANT CHANGE UP
PH UR: 6 — SIGNIFICANT CHANGE UP (ref 5–8)
PHOSPHATE SERPL-MCNC: 4.5 MG/DL — SIGNIFICANT CHANGE UP (ref 2.4–4.7)
PLATELET # BLD AUTO: 231 K/UL — SIGNIFICANT CHANGE UP (ref 150–400)
POTASSIUM SERPL-MCNC: 4.1 MMOL/L — SIGNIFICANT CHANGE UP (ref 3.5–5.3)
POTASSIUM SERPL-SCNC: 4.1 MMOL/L — SIGNIFICANT CHANGE UP (ref 3.5–5.3)
PROT UR-MCNC: 100 MG/DL
RBC # BLD: 4.26 M/UL — SIGNIFICANT CHANGE UP (ref 4.2–5.8)
RBC # FLD: 13.9 % — SIGNIFICANT CHANGE UP (ref 10.3–14.5)
RBC CASTS # UR COMP ASSIST: 106 /HPF — HIGH (ref 0–4)
SODIUM SERPL-SCNC: 137 MMOL/L — SIGNIFICANT CHANGE UP (ref 135–145)
SP GR SPEC: 1.02 — SIGNIFICANT CHANGE UP (ref 1–1.03)
SQUAMOUS # UR AUTO: 0 /HPF — SIGNIFICANT CHANGE UP (ref 0–5)
UROBILINOGEN FLD QL: 1 MG/DL — SIGNIFICANT CHANGE UP (ref 0.2–1)
WBC # BLD: 9.27 K/UL — SIGNIFICANT CHANGE UP (ref 3.8–10.5)
WBC # FLD AUTO: 9.27 K/UL — SIGNIFICANT CHANGE UP (ref 3.8–10.5)
WBC UR QL: 483 /HPF — HIGH (ref 0–5)

## 2024-02-14 PROCEDURE — 71045 X-RAY EXAM CHEST 1 VIEW: CPT | Mod: 26

## 2024-02-14 PROCEDURE — 99222 1ST HOSP IP/OBS MODERATE 55: CPT | Mod: 25

## 2024-02-14 PROCEDURE — 99024 POSTOP FOLLOW-UP VISIT: CPT

## 2024-02-14 PROCEDURE — 93010 ELECTROCARDIOGRAM REPORT: CPT

## 2024-02-14 PROCEDURE — 99291 CRITICAL CARE FIRST HOUR: CPT

## 2024-02-14 RX ORDER — LOPERAMIDE HCL 2 MG
2 TABLET ORAL DAILY
Refills: 0 | Status: DISCONTINUED | OUTPATIENT
Start: 2024-02-14 | End: 2024-02-15

## 2024-02-14 RX ORDER — METOPROLOL TARTRATE 50 MG
12.5 TABLET ORAL
Refills: 0 | Status: DISCONTINUED | OUTPATIENT
Start: 2024-02-14 | End: 2024-02-15

## 2024-02-14 RX ORDER — PANTOPRAZOLE SODIUM 20 MG/1
40 TABLET, DELAYED RELEASE ORAL
Refills: 0 | Status: DISCONTINUED | OUTPATIENT
Start: 2024-02-14 | End: 2024-02-15

## 2024-02-14 RX ORDER — MAGNESIUM SULFATE 500 MG/ML
2 VIAL (ML) INJECTION ONCE
Refills: 0 | Status: COMPLETED | OUTPATIENT
Start: 2024-02-14 | End: 2024-02-14

## 2024-02-14 RX ORDER — METOPROLOL TARTRATE 50 MG
2.5 TABLET ORAL ONCE
Refills: 0 | Status: COMPLETED | OUTPATIENT
Start: 2024-02-14 | End: 2024-02-14

## 2024-02-14 RX ADMIN — SODIUM CHLORIDE 3 MILLILITER(S): 9 INJECTION INTRAMUSCULAR; INTRAVENOUS; SUBCUTANEOUS at 23:53

## 2024-02-14 RX ADMIN — SODIUM CHLORIDE 3 MILLILITER(S): 9 INJECTION INTRAMUSCULAR; INTRAVENOUS; SUBCUTANEOUS at 16:11

## 2024-02-14 RX ADMIN — PANTOPRAZOLE SODIUM 40 MILLIGRAM(S): 20 TABLET, DELAYED RELEASE ORAL at 11:44

## 2024-02-14 RX ADMIN — HEPARIN SODIUM 5000 UNIT(S): 5000 INJECTION INTRAVENOUS; SUBCUTANEOUS at 21:45

## 2024-02-14 RX ADMIN — ONDANSETRON 4 MILLIGRAM(S): 8 TABLET, FILM COATED ORAL at 21:46

## 2024-02-14 RX ADMIN — Medication 25 GRAM(S): at 02:09

## 2024-02-14 RX ADMIN — PYRIDOSTIGMINE BROMIDE 60 MILLIGRAM(S): 60 SOLUTION ORAL at 11:44

## 2024-02-14 RX ADMIN — PYRIDOSTIGMINE BROMIDE 60 MILLIGRAM(S): 60 SOLUTION ORAL at 20:20

## 2024-02-14 RX ADMIN — MYCOPHENOLATE MOFETIL 1000 MILLIGRAM(S): 250 CAPSULE ORAL at 11:44

## 2024-02-14 RX ADMIN — Medication 2.5 MILLIGRAM(S): at 02:10

## 2024-02-14 RX ADMIN — Medication 12.5 MILLIGRAM(S): at 17:35

## 2024-02-14 RX ADMIN — HEPARIN SODIUM 5000 UNIT(S): 5000 INJECTION INTRAVENOUS; SUBCUTANEOUS at 13:24

## 2024-02-14 RX ADMIN — Medication 10 MILLIGRAM(S): at 06:04

## 2024-02-14 RX ADMIN — TAMSULOSIN HYDROCHLORIDE 0.4 MILLIGRAM(S): 0.4 CAPSULE ORAL at 21:46

## 2024-02-14 RX ADMIN — Medication 10 MILLIGRAM(S): at 21:45

## 2024-02-14 RX ADMIN — MYCOPHENOLATE MOFETIL 1000 MILLIGRAM(S): 250 CAPSULE ORAL at 21:47

## 2024-02-14 RX ADMIN — GABAPENTIN 200 MILLIGRAM(S): 400 CAPSULE ORAL at 21:45

## 2024-02-14 RX ADMIN — Medication 10 MILLIGRAM(S): at 13:25

## 2024-02-14 RX ADMIN — Medication 12.5 MILLIGRAM(S): at 06:03

## 2024-02-14 RX ADMIN — ONDANSETRON 4 MILLIGRAM(S): 8 TABLET, FILM COATED ORAL at 13:24

## 2024-02-14 RX ADMIN — HEPARIN SODIUM 5000 UNIT(S): 5000 INJECTION INTRAVENOUS; SUBCUTANEOUS at 06:06

## 2024-02-14 RX ADMIN — LATANOPROST 1 DROP(S): 0.05 SOLUTION/ DROPS OPHTHALMIC; TOPICAL at 21:46

## 2024-02-14 RX ADMIN — Medication 81 MILLIGRAM(S): at 11:44

## 2024-02-14 RX ADMIN — ONDANSETRON 4 MILLIGRAM(S): 8 TABLET, FILM COATED ORAL at 06:05

## 2024-02-14 RX ADMIN — SODIUM CHLORIDE 3 MILLILITER(S): 9 INJECTION INTRAMUSCULAR; INTRAVENOUS; SUBCUTANEOUS at 04:45

## 2024-02-14 RX ADMIN — PYRIDOSTIGMINE BROMIDE 60 MILLIGRAM(S): 60 SOLUTION ORAL at 16:18

## 2024-02-14 RX ADMIN — Medication 2 MILLIGRAM(S): at 23:52

## 2024-02-14 NOTE — CONSULT NOTE ADULT - NS ATTEND AMEND GEN_ALL_CORE FT
Patient seen and examined by me.    T(C): 36.7 (02-14-24 @ 08:00), Max: 37.2 (02-13-24 @ 19:00)  HR: 69 (02-14-24 @ 12:00) (62 - 135)  BP: 130/58 (02-14-24 @ 12:00) (103/67 - 160/85)  RR: 23 (02-14-24 @ 12:00) (12 - 30)  SpO2: 95% (02-14-24 @ 12:00) (91% - 98%)  Patient alert and awake.  Chest- Bilateral Clear BS  Cardiac- S1 and S2  Abdomen- Soft    Assessment/Plan:  1. PAF now in NSR.      I have discussed my recommendation with the PA which are outlined above.  Will sign off.

## 2024-02-14 NOTE — CONSULT NOTE ADULT - PROBLEM SELECTOR RECOMMENDATION 9
- Patient with a history of atrial fibrillation s/p ablation and Watchman.   - MCOT 09/23 with Afib burden of 1%.   - Patient with post-operative Afib with RVR, already converted back to NSR.   - Patient wants to defer AV nodals at this time as very self-limited.   - No need for AC at this time as with history of Watchman.   - Continue telemetry monitoring.  - Can give IV metoprolol or diltiazem if recurrence.

## 2024-02-14 NOTE — PROGRESS NOTE ADULT - SUBJECTIVE AND OBJECTIVE BOX
Subjective - patient seen and evaluated bedside. Sitting comfortably in bed. Denies CP, SOB, HA, dizziness, n/v/d    Review of Systems: negative x 10 systems except as noted above    Brief summary:  78yMale POD# 2 Robotic assisted hiatal hernia repair    Significant/[id number] events:  CT esophagram without leak.     PAST MEDICAL & SURGICAL HISTORY:  HLD (hyperlipidemia)      TIA (transient ischemic attack)  2006      Atrial fibrillation, unspecified type      Iron deficiency anemia      Nasal polyp      Benign prostatic hyperplasia, presence of lower urinary tract symptoms unspecified, unspecified morphology      Osteopenia      Glaucoma      Herpes virus disease  right eye      History of myasthenia gravis  2016 , no weakness, in remission      Right hip pain  avasular necrosis of right femoral head 2018      GERD (gastroesophageal reflux disease)      Other cataract  right      H/O nasal polypectomy  wears nasal strip for better breathing      H/O coronary angiogram  3/2012 no stents      Status post placement of implantable loop recorder  4/2018 left chest      Central venous catheter in place, temporary  vortex port implant on right chest      H/O cardiac radiofrequency ablation  May 2018      H/O colonoscopy      H/O endoscopy            aspirin enteric coated 81 milliGRAM(s) Oral daily  gabapentin 200 milliGRAM(s) Oral at bedtime  heparin   Injectable 5000 Unit(s) SubCutaneous every 8 hours  latanoprost 0.005% Ophthalmic Solution 1 Drop(s) Both EYES at bedtime  metoclopramide Injectable 10 milliGRAM(s) IV Push three times a day  mycophenolate mofetil 1000 milliGRAM(s) Oral two times a day  ondansetron Injectable 4 milliGRAM(s) IV Push three times a day  pantoprazole  Injectable 40 milliGRAM(s) IV Push daily  prednisoLONE acetate 1% Suspension 1 Drop(s) Both EYES <User Schedule>  pyridostigmine 60 milliGRAM(s) Oral <User Schedule>  senna 2 Tablet(s) Oral at bedtime PRN  sodium chloride 0.9% lock flush 3 milliLiter(s) IV Push every 8 hours  tamsulosin 0.4 milliGRAM(s) Oral at bedtime  MEDICATIONS  (PRN):  senna 2 Tablet(s) Oral at bedtime PRN Constipation      Daily     Daily                               12.4   9.59  )-----------( 243      ( 13 Feb 2024 02:34 )             35.7   02-13    139  |  103  |  10.7  ----------------------------<  99  4.5   |  24.0  |  0.68    Ca    8.7      13 Feb 2024 03:15  Phos  3.5     02-12  Mg     1.8     02-13    TPro  5.8<L>  /  Alb  3.6  /  TBili  0.3<L>  /  DBili  0.1  /  AST  35  /  ALT  35  /  AlkPhos  65  02-12      PT/INR - ( 13 Feb 2024 02:34 )   PT: 13.7 sec;   INR: 1.24 ratio         PTT - ( 13 Feb 2024 02:34 )  PTT:28.5 sec      Objective:  T(C): 37.2 (02-13-24 @ 19:00), Max: 37.7 (02-13-24 @ 12:00)  HR: 74 (02-13-24 @ 23:00) (61 - 96)  BP: 138/66 (02-13-24 @ 23:00) (111/59 - 144/61)  RR: 12 (02-13-24 @ 23:00) (10 - 24)  SpO2: 94% (02-13-24 @ 23:00) (91% - 96%)  Wt(kg): --CAPILLARY BLOOD GLUCOSE      I&O's Summary    12 Feb 2024 07:01  -  13 Feb 2024 07:00  --------------------------------------------------------  IN: 2015 mL / OUT: 2500 mL / NET: -485 mL    13 Feb 2024 07:01  -  14 Feb 2024 00:42  --------------------------------------------------------  IN: 1175 mL / OUT: 1400 mL / NET: -225 mL        Physical Exam  General: NAD  Neuro: A+O x 3, non-focal, speech clear and intact  Psych: Appropriate affect  HEENT:  NCAT, No conjuctival edema or icterus, no thrush.  Pulm: CTA, equal bilaterally  CV: RRR, +S1S2  Abd: soft, NT, ND, +BS  Ext: +DP Pulses b/l, no edema  Skin: Warm, dry, intact  Inc: abdominal incison sites c/d/i, no evidence of infection or bleeding        Imaging:    < from: Xray Chest 1 View- PORTABLE-Routine (Xray Chest 1 View- PORTABLE-Routine in AM.) (02.13.24 @ 06:13) >  INTERPRETATION:  Portable AP chest radiographs    COMPARISON: 2/12/2024 chest x-ray.    CLINICAL INFORMATION: Status post hiatal hernia repair..    FINDINGS:  CATHETERS AND TUBES: None    PULMONARY: The airway is midline.  LEFT medial basilar airspace disease obscuring LEFT aortic line and   medial diaphragm contour.  Remaining visualized lung parenchyma clear..  No large pleural effusion.  No pneumothorax.    HEART/VASCULAR: The heart size and mediastinum configuration are within   the limits of normal.    BONES: The visualized osseous thorax is intact.  Resolving postoperative pneumoperitoneum.    IMPRESSION:  SuspectLEFT retrocardiac airspace disease.    < end of copied text >  < from: CT Chest w/ Oral Cont (02.13.24 @ 15:51) >    IMPRESSION:    1.  Bilateral pleural effusions and passive atelectasis.  2.  No extravasation of the oral contrast from the esophagus into the   mediastinum.  3.  Bilateral pneumothoraces.  4.  Pneumomediastinum.  5.  Free air in the upper abdomen.  6.  The fracture of the anterior-inferior endplate of the L1 vertebral   body is new since 8/26/2023.    --- End of Report ---    < end of copied text >

## 2024-02-14 NOTE — CONSULT NOTE ADULT - SUBJECTIVE AND OBJECTIVE BOX
[facility]                                              CARDIOLOGY AT [place] [address]                                             Telephone: [phone]. Fax:[phone]                                                       CARDIOLOGY CONSULTATION NOTE                                                                                             History obtained by: Patient and medical record  Community Cardiologist: Dr. Goodrich   obtained: Yes [  ] No [ X ]  Reason for Consultation: Atrial Fibrillation  Available out pt records reviewed: Yes [ X ] No [  ]    Chief complaint:    Patient is a 78y old  Male who presents with a chief complaint of HH with GERD (13 Feb 2024 11:43)      HPI: Patient is a 79 y/o M with a PMHx of paroxysmal atrial fibrillation s/p Afib ablation (2018), Watchman's device (03/19, not on AC), TIA, anemia, mild aortic stenosis, and esophageal dysmotility (stricture vs. Schatzki ring s/p esophageal dilation x 3), GERD, and Myasthenia gravis who presented to [facility] now s/p robotic assisted hiatal hernia repair. Patient tolerated the procedure well, but converted into Afib with RVR overnight. Patient denies any symptoms associated with the Afib, and at the time of my evaluation had converted back in NSR. Patient had an MCOT monitor 09/23 with less than 1% Afib burden, and he is not maintained on AV nodals. Patient notes some dysuria, but otherwise denies fevers, chills, CP, SOB, syncope, near syncope, headache, or dizziness.       CARDIAC TESTING   ECHO:  Echo 11/21:   EF normal, ascending aorta 3.6, minimal AS, VAIBHAV 1.5 cm2, mild MR/TR    STRESS:  NST 02/19 Negative for ischemia     CATH:   [facility] 2012: Unremarkable, no significant CAD    ELECTROPHYSIOLOGY:     PAST MEDICAL HISTORY  HLD (hyperlipidemia)    GERD (gastroesophageal reflux disease)    TIA (transient ischemic attack)    Myasthenia gravis    Atrial fibrillation, unspecified type    Iron deficiency anemia    Nasal polyp    Benign prostatic hyperplasia, presence of lower urinary tract symptoms unspecified, unspecified morphology    Osteopenia    Urinary retention    Glaucoma    Sleep apnea    Herpes virus disease    History of myasthenia gravis    Right hip pain        PAST SURGICAL HISTORY  No significant past surgical history    Other cataract    H/O nasal polypectomy    H/O coronary angiogram    Status post placement of implantable loop recorder    Central venous catheter in place, temporary    H/O cardiac radiofrequency ablation    H/O colonoscopy    H/O endoscopy        SOCIAL HISTORY:    CIGARETTES:   Former smoker  ALCOHOL: Denies  DRUGS: Denies    FAMILY HISTORY:  Family history of anemia (Sibling)      Family History of Cardiovascular Disease:  Yes [  ] No [  ]  Coronary Artery Disease in first degree relative: Yes [  ] No [  ]  Sudden Cardiac Death in First degree relative: Yes [  ] No [  ]    HOME MEDICATIONS:  aspirin 81 mg oral tablet: 1 orally once a day (12 Feb 2024 05:55)  Calcium 600+D 600 mg-200 intl units oral tablet: 1 tab(s) orally 2 times a day (12 Feb 2024 05:55)  CellCept 500 mg oral tablet: 2 tab(s) orally 2 times a day (12 Feb 2024 05:55)  Centrum Silver oral tablet: 1 tab(s) orally once a day (12 Feb 2024 05:55)  Creon 1 capsule three time a day with meals:  (12 Feb 2024 05:55)  famotidine 40 mg oral tablet: 1 tab(s) orally once a day (12 Feb 2024 05:55)  ferrous sulfate 325 mg (65 mg elemental iron) oral delayed release tablet: 1 tab(s) orally once a day (12 Feb 2024 05:55)  Fish Oil 1200 mg oral capsule: 1 cap(s) orally once a day (12 Feb 2024 05:55)  gabapentin 100 mg oral capsule: 2 cap(s) orally once a day (at bedtime) (12 Feb 2024 05:55)  Glucosamine &amp; Chondroitin with MSM oral tablet: 1 tab(s) orally once a day (at bedtime) (12 Feb 2024 05:55)  Jublia 10% topical solution: Apply topically to affected area once a day (12 Feb 2024 05:55)  latanoprost 0.005% ophthalmic solution: 1 drop(s) to each affected eye once a day (in the evening) (12 Feb 2024 05:55)  mirtazapine 7.5 mg oral tablet: 1 tab(s) orally once a day (at bedtime) (12 Feb 2024 05:55)  omeprazole 40 mg oral delayed release capsule: 1 cap(s) orally 2 times a day (12 Feb 2024 05:55)  Pepto-Bismol 525 mg/30 mL oral suspension: 30 milliliter(s) orally prn (12 Feb 2024 05:55)  prednisoLONE acetate 1% ophthalmic suspension: 1 drop(s) in each affected eye 1 drop a day 2 times a week (12 Feb 2024 05:55)  Prolia 60 mg/mL subcutaneous solution: 60 milligram(s) subcutaneously every 6 months (12 Feb 2024 05:55)  Tums 500 mg oral tablet, chewable: 1 tab(s) chewed prn (12 Feb 2024 05:55)      CURRENT CARDIAC MEDICATIONS:  metoprolol tartrate 12.5 milliGRAM(s) Oral two times a day      CURRENT OTHER MEDICATIONS:  gabapentin 200 milliGRAM(s) Oral at bedtime  metoclopramide Injectable 10 milliGRAM(s) IV Push three times a day  ondansetron Injectable 4 milliGRAM(s) IV Push three times a day  pantoprazole  Injectable 40 milliGRAM(s) IV Push daily  senna 2 Tablet(s) Oral at bedtime PRN Constipation  aspirin enteric coated 81 milliGRAM(s) Oral daily  heparin   Injectable 5000 Unit(s) SubCutaneous every 8 hours  latanoprost 0.005% Ophthalmic Solution 1 Drop(s) Both EYES at bedtime  mycophenolate mofetil 1000 milliGRAM(s) Oral two times a day  prednisoLONE acetate 1% Suspension 1 Drop(s) Both EYES <User Schedule>  pyridostigmine 60 milliGRAM(s) Oral <User Schedule>  sodium chloride 0.9% lock flush 3 milliLiter(s) IV Push every 8 hours  tamsulosin 0.4 milliGRAM(s) Oral at bedtime      ALLERGIES:   penicillins (Rash)  immune globulin intravenous (Unknown)      REVIEW OF SYMPTOMS:   CONSTITUTIONAL: No fever, no chills, no weight loss, no weight gain, no fatigue   ENMT:  No vertigo; No sinus or throat pain  NECK: No pain or stiffness  CARDIOVASCULAR: AS PER HPI  RESPIRATORY: AS PER HPI  : No dysuria, no hematuria   GI: AS PER HPI  NEURO: No headache, no slurred speech   MUSCULOSKELETAL: No joint pain or swelling; No muscle, back, or extremity pain  PSYCH: No agitation, no anxiety.    ALL OTHER REVIEW OF SYSTEMS ARE NEGATIVE.    VITAL SIGNS:  T(C): 36.7 (02-14-24 @ 08:00), Max: 37.7 (02-13-24 @ 12:00)  T(F): 98.1 (02-14-24 @ 08:00), Max: 99.8 (02-13-24 @ 12:00)  HR: 68 (02-14-24 @ 10:00) (62 - 135)  BP: 115/59 (02-14-24 @ 10:00) (103/67 - 160/85)  RR: 25 (02-14-24 @ 10:00) (12 - 30)  SpO2: 98% (02-14-24 @ 10:00) (91% - 98%)    INTAKE AND OUTPUT:     02-13 @ 07:01  -  02-14 @ 07:00  --------------------------------------------------------  IN: 1465 mL / OUT: 2100 mL / NET: -635 mL    02-14 @ 07:01  -  02-14 @ 10:08  --------------------------------------------------------  IN: 300 mL / OUT: 0 mL / NET: 300 mL        PHYSICAL EXAM:  Constitutional: Comfortable . No acute distress.   HEENT: Atraumatic and normocephalic , neck is supple . no JVD. No carotid bruit.  CNS: A&Ox3. No focal deficits.   Respiratory: CTAB, unlabored   Cardiovascular: RRR normal s1 s2.  No rubs or gallop. II/VI murmur lsb  Gastrointestinal: Soft, non-tender. +Bowel sounds.   Extremities: 2+ Peripheral Pulses, No clubbing, cyanosis, or edema  Psychiatric: Calm . no agitation.   Skin: Warm and dry, no ulcers on extremities     LABS:                            13.2   9.27  )-----------( 231      ( 14 Feb 2024 02:00 )             39.8     02-14    137  |  103  |  10.8  ----------------------------<  86  4.1   |  23.0  |  0.65    Ca    8.4      14 Feb 2024 02:00  Phos  4.5     02-14  Mg     1.9     02-14    TPro  5.8<L>  /  Alb  3.6  /  TBili  0.3<L>  /  DBili  0.1  /  AST  35  /  ALT  35  /  AlkPhos  65  02-12    PT/INR - ( 13 Feb 2024 02:34 )   PT: 13.7 sec;   INR: 1.24 ratio         PTT - ( 13 Feb 2024 02:34 )  PTT:28.5 sec  Urinalysis Basic - ( 14 Feb 2024 02:00 )    Color: x / Appearance: x / SG: x / pH: x  Gluc: 86 mg/dL / Ketone: x  / Bili: x / Urobili: x   Blood: x / Protein: x / Nitrite: x   Leuk Esterase: x / RBC: x / WBC x   Sq Epi: x / Non Sq Epi: x / Bacteria: x        INTERPRETATION OF TELEMETRY: SR, Afib with RVR    ECG: Afib with RVR, NSST/T wave abnormalities      Prior ECG: Yes [  ] No [  ]    RADIOLOGY & ADDITIONAL STUDIES:    X-ray:    < from: Xray Chest 1 View- PORTABLE-Routine (Xray Chest 1 View- PORTABLE-Routine in AM.) (02.13.24 @ 06:13) >    IMPRESSION:  SuspectLEFT retrocardiac airspace disease.    < end of copied text >    CT scan:   < from: CT Chest w/ Oral Cont (02.13.24 @ 15:51) >  FINDINGS:    Tubes/Lines: None.    Lungs, airways and pleura: Bilateral pleural effusions and lower lobe   passive atelectasis.    Bilateral pneumothoraces. The airways are unremarkable.    Mediastinum: The esophagus is dilated. There is oral contrast in the   esophagus. No extravasation of contrast from the esophagus. There is   pneumomediastinum.    The visualized thyroid gland is normal. There are no enlarged chest lymph   nodes. The heart is normal in size. Aortic valve calcifications. Status   post left atrial appendage occlusion device placement. No pericardial   effusion.    Upper Abdomen: Free air in the upper abdomen.    Bones And Soft Tissues: Spondylosis of the thoracic spine. There is a new   fracture of the anterior-inferior aspect of the L1 vertebralbody.   Anterior wedging of the T11 vertebral body. Deformity of the superior   endplate of the L3 vertebral body.  Pneumomediastinum. Hypodense hepatic   lesions are most compatible with cysts. Ascites.      IMPRESSION:    1.  Bilateral pleural effusions and passive atelectasis.  2.  No extravasation of the oral contrast from the esophagus into the   mediastinum.  3.  Bilateral pneumothoraces.  4.  Pneumomediastinum.  5.  Free air in the upper abdomen.  6.  The fracture of the anterior-inferior endplate of the L1 vertebral   body is new since 8/26/2023.    --- End of Report ---    < end of copied text >    MRI:   US:

## 2024-02-14 NOTE — PROGRESS NOTE ADULT - SUBJECTIVE AND OBJECTIVE BOX
SAL SUTTON  MRN-[id number]    HPI:  Patient is a 78 year old male presenting today for PST, PMH includes TIA, a-fib, anemia, esophageal dysmotility (and stricture vs Schatzki ring s/p esophageal dilation x 3 due to hiccups, bubble and vomiting - Minesh Solares [place]), GERD, Myasthenia Gravis. Patient c/o acid reflux and diarrhea. He reports severe burning of the chest and severe migraines. He can feel the pressure under his ribs with eating which he feels is due to the hernia. Today he presents with his wife Maurilio who assists with the history taking. Reports trying diet and lifestyle changes to address his acid reflux with no improvement. He is now scheduled for upper endoscopy robot assisted hiatal hernia repair with fundoplication on 2/12/24 with Dr. Evangelista pending medical, cardiac and neurology clearance.  (24 Jan 2024 12:26)        Surgery/Hospital Course:  ·  PRE-OP DIAGNOSIS:  Hiatal hernia with GERD and esophagitis   ·  POST-OP DIAGNOSIS:  Hiatal hernia with GERD and esophagitis   ·  PROCEDURES:  Nissen fundoplication, laparoscopic, robotic assisted 12-Feb-2024   Today:  No acute events-  -CT Chest w/ Oral Cont (02.13.24 )  1.  Bilateral pleural effusions and passive atelectasis.  2.  No extravasation of the oral contrast from the esophagus into the   mediastinum.  3.  Bilateral pneumothoraces.  4.  Pneumomediastinum.  5.  Free air in the upper abdomen.  6.  The fracture of the anterior-inferior endplate of the L1 vertebral   body is new since 8/26/2023.    ICU Vital Signs Last 24 Hrs  T(C): 36.7 (14 Feb 2024 08:00), Max: 37.7 (13 Feb 2024 12:00)  T(F): 98.1 (14 Feb 2024 08:00), Max: 99.8 (13 Feb 2024 12:00)  HR: 68 (14 Feb 2024 10:00) (62 - 135)  BP: 115/59 (14 Feb 2024 10:00) (103/67 - 160/85)  BP(mean): 79 (14 Feb 2024 10:00) (79 - 113)  ABP: --  ABP(mean): --  RR: 25 (14 Feb 2024 10:00) (12 - 30)  SpO2: 98% (14 Feb 2024 10:00) (91% - 98%)    O2 Parameters below as of 14 Feb 2024 10:00  Patient On (Oxygen Delivery Method): room air            Physical Exam:  Gen: A&O   CNS: non focal 	  Neck: no JVD  RES : clear , no wheezing              CVS: Regular  rhythm. Normal S1/S2  Abd: Soft, non-distended. Bowel sounds present.  Skin: No rash.  Ext:  no edema    ============================I/O===========================   I&O's Detail    13 Feb 2024 07:01  -  14 Feb 2024 07:00  --------------------------------------------------------  IN:    IV PiggyBack: 100 mL    Lactated Ringers: 825 mL    Oral Fluid: 540 mL  Total IN: 1465 mL    OUT:    Voided (mL): 2100 mL  Total OUT: 2100 mL    Total NET: -635 mL      14 Feb 2024 07:01  -  14 Feb 2024 11:28  --------------------------------------------------------  IN:    Oral Fluid: 300 mL  Total IN: 300 mL    OUT:  Total OUT: 0 mL    Total NET: 300 mL        ============================ LABS =========================                        13.2   9.27  )-----------( 231      ( 14 Feb 2024 02:00 )             39.8     02-14    137  |  103  |  10.8  ----------------------------<  86  4.1   |  23.0  |  0.65    Ca    8.4      14 Feb 2024 02:00  Phos  4.5     02-14  Mg     1.9     02-14    TPro  5.8<L>  /  Alb  3.6  /  TBili  0.3<L>  /  DBili  0.1  /  AST  35  /  ALT  35  /  AlkPhos  65  02-12    LIVER FUNCTIONS - ( 12 Feb 2024 13:15 )  Alb: 3.6 g/dL / Pro: 5.8 g/dL / ALK PHOS: 65 U/L / ALT: 35 U/L / AST: 35 U/L / GGT: x           PT/INR - ( 13 Feb 2024 02:34 )   PT: 13.7 sec;   INR: 1.24 ratio         PTT - ( 13 Feb 2024 02:34 )  PTT:28.5 sec    Urinalysis Basic - ( 14 Feb 2024 02:00 )    Color: x / Appearance: x / SG: x / pH: x  Gluc: 86 mg/dL / Ketone: x  / Bili: x / Urobili: x   Blood: x / Protein: x / Nitrite: x   Leuk Esterase: x / RBC: x / WBC x   Sq Epi: x / Non Sq Epi: x / Bacteria: x      ======================Micro/Rad/Cardio=================  Culture: Reviewed   CXR: Reviewed  Echo:Reviewed  ======================================================  PAST MEDICAL & SURGICAL HISTORY:  HLD (hyperlipidemia)      GERD (gastroesophageal reflux disease)      TIA (transient ischemic attack)  2006      Atrial fibrillation, unspecified type      Iron deficiency anemia      Nasal polyp      Benign prostatic hyperplasia, presence of lower urinary tract symptoms unspecified, unspecified morphology      Osteopenia      Glaucoma      Herpes virus disease  right eye      History of myasthenia gravis  2016 , no weakness, in remission      Right hip pain  avasular necrosis of right femoral head 2018      Other cataract  right      H/O nasal polypectomy  wears nasal strip for better breathing      H/O coronary angiogram  3/2012 no stents      Status post placement of implantable loop recorder  4/2018 left chest      Central venous catheter in place, temporary  vortex port implant on right chest      H/O cardiac radiofrequency ablation  May 2018      H/O colonoscopy      H/O endoscopy        ====================ASSESSMENT ==============  - 78 year old male presenting today for PST, PMH includes TIA, a-fib, anemia, esophageal dysmotility (and stricture vs Schatzki ring s/p esophageal dilation x 3 due to hiccups, bubble and vomiting - Minesh Solares [place]), GERD, Myasthenia Gravis.   -Patient c/o acid reflux and diarrhea. He reports severe burning of the chest and severe migraines. He can feel the pressure under his ribs with eating which he feels is due to the hernia.       ---HLD (hyperlipidemia)  ---GERD (gastroesophageal reflux disease)  ---TIA (transient ischemic attack)  ---Atrial fibrillation, unspecified type  ---Benign prostatic hyperplasia, presence of lower urinary tract symptoms unspecified, unspecified morphology  ---History of myasthenia gravis  ---H/O coronary angiogram  ---H/O cardiac radiofrequency ablation  ---Hiatal hernia with GERD and esophagitis   ---S/p Nissen fundoplication, laparoscopic, robotic assisted 12-Feb-2024   ---Post op Hypovolemia  ---Post op respiratory insufficiency       Plan:  -Continue protonix, reglan, zofran  -Chest PT and IS use with bedside nurse.  - Continue mestinon at home dose and frequency.  - HSQ and SCDs for DVT prophylaxis  - GI prophylaxis-  -Cardiology consult  ====================== NEUROLOGY=====================  gabapentin 200 milliGRAM(s) Oral at bedtime  metoclopramide Injectable 10 milliGRAM(s) IV Push three times a day  ondansetron Injectable 4 milliGRAM(s) IV Push three times a day    ==================== RESPIRATORY======================  Post op respiratory insufficiency    ====================CARDIOVASCULAR==================  Post op Hypovolemia  metoprolol tartrate 12.5 milliGRAM(s) Oral two times a day    ===================HEMATOLOGIC/ONC ===================  Monitor H&H/Plts    aspirin enteric coated 81 milliGRAM(s) Oral daily  heparin   Injectable 5000 Unit(s) SubCutaneous every 8 hours    ===================== RENAL =========================  Continue monitoring urine output, I&OS, BUN/Cr   tamsulosin 0.4 milliGRAM(s) Oral at bedtime    ==================== GASTROINTESTINAL===================  pantoprazole  Injectable 40 milliGRAM(s) IV Push daily  senna 2 Tablet(s) Oral at bedtime PRN Constipation  sodium chloride 0.9% lock flush 3 milliLiter(s) IV Push every 8 hours    =======================    ENDOCRINE  =====================    ========================INFECTIOUS DISEASE================      -Monitor Neurologic status ,   -Head of the bed should remain elevated to 45 degrees,  -Monitor for arrhythmias and monitor parameters for organ perfusion,  -Glycemic control is satisfactory,  -Nutritional goals will be met using po eventually , insure adequate caloric intake and monitor the same ,  -Electrolytes have been repleted as necessary , pain control has been achieved  and wound care has been carried out ,  -Stress ulcer and VTE prophylaxis will be achieved,  -Agressive PT and early mobility and ambulation goals will be met,  -The family was updated about the course and plan .      I have spent 35 minutes providing acute care for this critically ill patient     Patient requires continuous monitoring with bedside rhythm monitoring, pulse ox monitoring, and intermittent blood gas analysis. Care plan discussed with ICU care team. Patient remained critical and at risk for life threatening decompensation.

## 2024-02-14 NOTE — CONSULT NOTE ADULT - ASSESSMENT
A/P: Patient is a 77 y/o M with a PMHx of paroxysmal atrial fibrillation s/p Afib ablation (2018), Watchman's device (03/19, not on AC), TIA, anemia, mild aortic stenosis, and esophageal dysmotility (stricture vs. Schatzki ring s/p esophageal dilation x 3), GERD, and Myasthenia gravis who presented to [facility] now s/p robotic assisted hiatal hernia repair. Patient tolerated the procedure well, but converted into Afib with RVR overnight. Patient denies any symptoms associated with the Afib, and at the time of my evaluation had converted back in NSR. Patient had an MCOT monitor 09/23 with less than 1% Afib burden, and he is not maintained on AV nodals. Patient notes some dysuria, but otherwise denies fevers, chills, CP, SOB, syncope, near syncope, headache, or dizziness.

## 2024-02-15 ENCOUNTER — TRANSCRIPTION ENCOUNTER (OUTPATIENT)
Age: 79
End: 2024-02-15

## 2024-02-15 VITALS
SYSTOLIC BLOOD PRESSURE: 151 MMHG | HEART RATE: 76 BPM | DIASTOLIC BLOOD PRESSURE: 84 MMHG | TEMPERATURE: 98 F | RESPIRATION RATE: 18 BRPM

## 2024-02-15 LAB
ANION GAP SERPL CALC-SCNC: 11 MMOL/L — SIGNIFICANT CHANGE UP (ref 5–17)
BUN SERPL-MCNC: 12.9 MG/DL — SIGNIFICANT CHANGE UP (ref 8–20)
CALCIUM SERPL-MCNC: 8.5 MG/DL — SIGNIFICANT CHANGE UP (ref 8.4–10.5)
CHLORIDE SERPL-SCNC: 102 MMOL/L — SIGNIFICANT CHANGE UP (ref 96–108)
CO2 SERPL-SCNC: 25 MMOL/L — SIGNIFICANT CHANGE UP (ref 22–29)
CREAT SERPL-MCNC: 0.67 MG/DL — SIGNIFICANT CHANGE UP (ref 0.5–1.3)
EGFR: 96 ML/MIN/1.73M2 — SIGNIFICANT CHANGE UP
GLUCOSE SERPL-MCNC: 90 MG/DL — SIGNIFICANT CHANGE UP (ref 70–99)
HCT VFR BLD CALC: 36.5 % — LOW (ref 39–50)
HGB BLD-MCNC: 12.2 G/DL — LOW (ref 13–17)
MAGNESIUM SERPL-MCNC: 1.8 MG/DL — SIGNIFICANT CHANGE UP (ref 1.6–2.6)
MCHC RBC-ENTMCNC: 30.7 PG — SIGNIFICANT CHANGE UP (ref 27–34)
MCHC RBC-ENTMCNC: 33.4 GM/DL — SIGNIFICANT CHANGE UP (ref 32–36)
MCV RBC AUTO: 91.7 FL — SIGNIFICANT CHANGE UP (ref 80–100)
PHOSPHATE SERPL-MCNC: 4.1 MG/DL — SIGNIFICANT CHANGE UP (ref 2.4–4.7)
PLATELET # BLD AUTO: 238 K/UL — SIGNIFICANT CHANGE UP (ref 150–400)
POTASSIUM SERPL-MCNC: 3.8 MMOL/L — SIGNIFICANT CHANGE UP (ref 3.5–5.3)
POTASSIUM SERPL-SCNC: 3.8 MMOL/L — SIGNIFICANT CHANGE UP (ref 3.5–5.3)
RBC # BLD: 3.98 M/UL — LOW (ref 4.2–5.8)
RBC # FLD: 13.7 % — SIGNIFICANT CHANGE UP (ref 10.3–14.5)
SODIUM SERPL-SCNC: 138 MMOL/L — SIGNIFICANT CHANGE UP (ref 135–145)
WBC # BLD: 7.85 K/UL — SIGNIFICANT CHANGE UP (ref 3.8–10.5)
WBC # FLD AUTO: 7.85 K/UL — SIGNIFICANT CHANGE UP (ref 3.8–10.5)

## 2024-02-15 PROCEDURE — 87186 SC STD MICRODIL/AGAR DIL: CPT

## 2024-02-15 PROCEDURE — C9399: CPT

## 2024-02-15 PROCEDURE — 85027 COMPLETE CBC AUTOMATED: CPT

## 2024-02-15 PROCEDURE — 87077 CULTURE AEROBIC IDENTIFY: CPT

## 2024-02-15 PROCEDURE — 71250 CT THORAX DX C-: CPT

## 2024-02-15 PROCEDURE — 80048 BASIC METABOLIC PNL TOTAL CA: CPT

## 2024-02-15 PROCEDURE — 85730 THROMBOPLASTIN TIME PARTIAL: CPT

## 2024-02-15 PROCEDURE — 36415 COLL VENOUS BLD VENIPUNCTURE: CPT

## 2024-02-15 PROCEDURE — 71045 X-RAY EXAM CHEST 1 VIEW: CPT

## 2024-02-15 PROCEDURE — 93005 ELECTROCARDIOGRAM TRACING: CPT

## 2024-02-15 PROCEDURE — 99024 POSTOP FOLLOW-UP VISIT: CPT

## 2024-02-15 PROCEDURE — 85610 PROTHROMBIN TIME: CPT

## 2024-02-15 PROCEDURE — 80076 HEPATIC FUNCTION PANEL: CPT

## 2024-02-15 PROCEDURE — S2900: CPT

## 2024-02-15 PROCEDURE — 88302 TISSUE EXAM BY PATHOLOGIST: CPT

## 2024-02-15 PROCEDURE — 84100 ASSAY OF PHOSPHORUS: CPT

## 2024-02-15 PROCEDURE — 71045 X-RAY EXAM CHEST 1 VIEW: CPT | Mod: 26

## 2024-02-15 PROCEDURE — 87086 URINE CULTURE/COLONY COUNT: CPT

## 2024-02-15 PROCEDURE — C1889: CPT

## 2024-02-15 PROCEDURE — 83735 ASSAY OF MAGNESIUM: CPT

## 2024-02-15 PROCEDURE — 81001 URINALYSIS AUTO W/SCOPE: CPT

## 2024-02-15 RX ORDER — MYCOPHENOLATE MOFETIL 250 MG/1
2 CAPSULE ORAL
Qty: 120 | Refills: 0
Start: 2024-02-15 | End: 2024-03-15

## 2024-02-15 RX ORDER — POTASSIUM CHLORIDE 20 MEQ
20 PACKET (EA) ORAL ONCE
Refills: 0 | Status: COMPLETED | OUTPATIENT
Start: 2024-02-15 | End: 2024-02-15

## 2024-02-15 RX ORDER — GABAPENTIN 400 MG/1
2 CAPSULE ORAL
Qty: 60 | Refills: 0
Start: 2024-02-15 | End: 2024-03-15

## 2024-02-15 RX ORDER — ASPIRIN/CALCIUM CARB/MAGNESIUM 324 MG
1 TABLET ORAL
Qty: 30 | Refills: 0
Start: 2024-02-15 | End: 2024-03-15

## 2024-02-15 RX ORDER — METOPROLOL TARTRATE 50 MG
0.5 TABLET ORAL
Qty: 30 | Refills: 0
Start: 2024-02-15 | End: 2024-03-15

## 2024-02-15 RX ORDER — CALCIUM CARBONATE 500(1250)
1 TABLET ORAL
Refills: 0 | DISCHARGE

## 2024-02-15 RX ORDER — LATANOPROST 0.05 MG/ML
1 SOLUTION/ DROPS OPHTHALMIC; TOPICAL
Qty: 1 | Refills: 0
Start: 2024-02-15 | End: 2024-03-15

## 2024-02-15 RX ORDER — PANTOPRAZOLE SODIUM 20 MG/1
1 TABLET, DELAYED RELEASE ORAL
Qty: 30 | Refills: 0
Start: 2024-02-15 | End: 2024-03-15

## 2024-02-15 RX ORDER — EFINACONAZOLE 100 MG/ML
1 SOLUTION TOPICAL
Refills: 0 | DISCHARGE

## 2024-02-15 RX ORDER — ASPIRIN/CALCIUM CARB/MAGNESIUM 324 MG
1 TABLET ORAL
Refills: 0 | DISCHARGE

## 2024-02-15 RX ORDER — PYRIDOSTIGMINE BROMIDE 60 MG/5ML
1 SOLUTION ORAL
Qty: 120 | Refills: 0
Start: 2024-02-15 | End: 2024-03-15

## 2024-02-15 RX ORDER — FAMOTIDINE 10 MG/ML
1 INJECTION INTRAVENOUS
Refills: 0 | DISCHARGE

## 2024-02-15 RX ORDER — MAGNESIUM SULFATE 500 MG/ML
2 VIAL (ML) INJECTION ONCE
Refills: 0 | Status: COMPLETED | OUTPATIENT
Start: 2024-02-15 | End: 2024-02-15

## 2024-02-15 RX ORDER — PREDNISOLONE SODIUM PHOSPHATE 1 %
1 DROPS OPHTHALMIC (EYE)
Qty: 1 | Refills: 0
Start: 2024-02-15 | End: 2024-03-15

## 2024-02-15 RX ORDER — TAMSULOSIN HYDROCHLORIDE 0.4 MG/1
1 CAPSULE ORAL
Qty: 30 | Refills: 0
Start: 2024-02-15 | End: 2024-03-15

## 2024-02-15 RX ORDER — METOCLOPRAMIDE HCL 10 MG
1 TABLET ORAL
Qty: 21 | Refills: 0
Start: 2024-02-15 | End: 2024-02-21

## 2024-02-15 RX ORDER — PREDNISOLONE SODIUM PHOSPHATE 1 %
1 DROPS OPHTHALMIC (EYE)
Refills: 0 | DISCHARGE

## 2024-02-15 RX ORDER — GLUCOSAMINE/MSM/CHONDROITIN A 750-375MG
1 TABLET ORAL
Qty: 0 | Refills: 0 | DISCHARGE

## 2024-02-15 RX ORDER — DENOSUMAB 60 MG/ML
60 INJECTION SUBCUTANEOUS
Refills: 0 | DISCHARGE

## 2024-02-15 RX ORDER — OMEGA-3 ACID ETHYL ESTERS 1 G
1 CAPSULE ORAL
Qty: 0 | Refills: 0 | DISCHARGE

## 2024-02-15 RX ORDER — GABAPENTIN 400 MG/1
2 CAPSULE ORAL
Refills: 0 | DISCHARGE

## 2024-02-15 RX ORDER — OMEPRAZOLE 10 MG/1
1 CAPSULE, DELAYED RELEASE ORAL
Refills: 0 | DISCHARGE

## 2024-02-15 RX ORDER — MIRTAZAPINE 45 MG/1
0 TABLET, ORALLY DISINTEGRATING ORAL
Refills: 0 | DISCHARGE

## 2024-02-15 RX ORDER — LATANOPROST 0.05 MG/ML
1 SOLUTION/ DROPS OPHTHALMIC; TOPICAL
Qty: 0 | Refills: 0 | DISCHARGE

## 2024-02-15 RX ORDER — PYRIDOSTIGMINE BROMIDE 60 MG/5ML
1 SOLUTION ORAL
Qty: 30 | Refills: 0
Start: 2024-02-15 | End: 2024-03-15

## 2024-02-15 RX ORDER — MYCOPHENOLATE MOFETIL 250 MG/1
2 CAPSULE ORAL
Refills: 0 | DISCHARGE

## 2024-02-15 RX ADMIN — Medication 2 MILLIGRAM(S): at 11:15

## 2024-02-15 RX ADMIN — HEPARIN SODIUM 5000 UNIT(S): 5000 INJECTION INTRAVENOUS; SUBCUTANEOUS at 05:26

## 2024-02-15 RX ADMIN — ONDANSETRON 4 MILLIGRAM(S): 8 TABLET, FILM COATED ORAL at 05:26

## 2024-02-15 RX ADMIN — ONDANSETRON 4 MILLIGRAM(S): 8 TABLET, FILM COATED ORAL at 15:00

## 2024-02-15 RX ADMIN — Medication 10 MILLIGRAM(S): at 15:01

## 2024-02-15 RX ADMIN — PYRIDOSTIGMINE BROMIDE 60 MILLIGRAM(S): 60 SOLUTION ORAL at 05:27

## 2024-02-15 RX ADMIN — PYRIDOSTIGMINE BROMIDE 60 MILLIGRAM(S): 60 SOLUTION ORAL at 11:14

## 2024-02-15 RX ADMIN — Medication 25 GRAM(S): at 08:56

## 2024-02-15 RX ADMIN — Medication 10 MILLIGRAM(S): at 05:26

## 2024-02-15 RX ADMIN — PYRIDOSTIGMINE BROMIDE 60 MILLIGRAM(S): 60 SOLUTION ORAL at 15:46

## 2024-02-15 RX ADMIN — MYCOPHENOLATE MOFETIL 1000 MILLIGRAM(S): 250 CAPSULE ORAL at 11:14

## 2024-02-15 RX ADMIN — Medication 81 MILLIGRAM(S): at 11:14

## 2024-02-15 RX ADMIN — Medication 20 MILLIEQUIVALENT(S): at 08:56

## 2024-02-15 RX ADMIN — SODIUM CHLORIDE 3 MILLILITER(S): 9 INJECTION INTRAMUSCULAR; INTRAVENOUS; SUBCUTANEOUS at 05:27

## 2024-02-15 RX ADMIN — Medication 1 DROP(S): at 05:27

## 2024-02-15 RX ADMIN — SODIUM CHLORIDE 3 MILLILITER(S): 9 INJECTION INTRAMUSCULAR; INTRAVENOUS; SUBCUTANEOUS at 13:13

## 2024-02-15 RX ADMIN — Medication 12.5 MILLIGRAM(S): at 05:26

## 2024-02-15 RX ADMIN — PANTOPRAZOLE SODIUM 40 MILLIGRAM(S): 20 TABLET, DELAYED RELEASE ORAL at 05:27

## 2024-02-15 NOTE — PROGRESS NOTE ADULT - PROBLEM SELECTOR PROBLEM 1
Diaphragmatic hernia without obstruction or gangrene

## 2024-02-15 NOTE — DISCHARGE NOTE PROVIDER - NSDCFUADDAPPT_GEN_ALL_CORE_FT
-Please follow up with Dr. Evangelista on 2/29/24 at 1:45pm.  -Please follow up with your cardiologist within 1 week of discharge.   -Out office will follow up with you regarding your urine culture. They will call you if you need to take the antibiotic prescribed.   You had a hiatal hernia repair with Dr. Evangelista. Please follow these instructions on discharge:  1. You have small incisions on your abdomen that were closed with skin glue. The skin glue will peel on its own.   2. Shower daily. Wash incisions with soap and water. No rubbing of the incisions. Pat dry.  3. Continue a full liquid diet for 2-3 days from discharge. If you are tolerating full liquids, you may start soft foods but no red meats. Cut food into small bite sized portions. Smaller meals throughout the day are better than large meals. Continue this until your follow up visit.  4. Avoid foods that trigger heartburn (fatty or fried foods, etc).  5. Continue to use your incentive spirometer, deep breathe, cough, and walk often. [place] more frequent walks are better.  6. No heavy lifting for two weeks after surgery.

## 2024-02-15 NOTE — PROGRESS NOTE ADULT - PROBLEM SELECTOR PLAN 1
CT esophagram without leak  Tolerating full liquid die  Pt with episodes of diarrhea, imodium given.   Continue protonix, reglan, zofran  Encourage OOB to chair and ambulation with PT  Encourage deep breathing exercised and coughing  Chest PT and IS use with bedside nurse    Incidental finding of L1 vertebral fracture on CT scan. WIll discuss in AM rounds possibly obtaining spine consult.     To be further d/w team on AM rounds
Pending CT esophagram this AM prior to advancing diet  Continue protonix, reglan, zofran  Encourage OOB to chair and ambulation with PT  Encourage deep breathing exercised and coughing  Chest PT and IS use with bedside nurse
CT esophagram without leak  Tolerating full  liquid diet  Continue protonix, reglan, zofran  Encourage OOB to chair and ambulation with PT  Encourage deep breathing exercised and coughing  Chest PT and IS use with bedside nurse    Incidental finding of L1 vertebral fracture on CT scan. WIll discuss in AM rounds possibly obtaining spine consult.     To be further d/w team on AM rounds

## 2024-02-15 NOTE — DISCHARGE NOTE PROVIDER - NSDCMRMEDTOKEN_GEN_ALL_CORE_FT
aspirin 81 mg oral delayed release tablet: 1 tab(s) orally once a day  Calcium 600+D 600 mg-200 intl units oral tablet: 1 tab(s) orally 2 times a day  CellCept 500 mg oral tablet: 2 tab(s) orally 2 times a day  Centrum Silver oral tablet: 1 tab(s) orally once a day  gabapentin 100 mg oral capsule: 2 cap(s) orally once a day (at bedtime)  latanoprost 0.005% ophthalmic solution: 1 drop(s) to each affected eye once a day (at bedtime)  metoclopramide 10 mg oral tablet: 1 tab(s) orally 3 times a day  Metoprolol Tartrate 25 mg oral tablet: 0.5 tab(s) orally every 12 hours  pantoprazole 40 mg oral delayed release tablet: 1 tab(s) orally once a day (before a meal)  prednisoLONE acetate 1% ophthalmic suspension: 1 drop(s) to each affected eye once a day  pyRIDostigmine 60 mg oral tablet: 1 tab(s) orally once a day  tamsulosin 0.4 mg oral capsule: 1 cap(s) orally once a day (at bedtime)   aspirin 81 mg oral delayed release tablet: 1 tab(s) orally once a day  Bactrim  mg-160 mg oral tablet: 1 tab(s) orally 2 times a day  Calcium 600+D 600 mg-200 intl units oral tablet: 1 tab(s) orally 2 times a day  CellCept 500 mg oral tablet: 2 tab(s) orally 2 times a day  Centrum Silver oral tablet: 1 tab(s) orally once a day  gabapentin 100 mg oral capsule: 2 cap(s) orally once a day (at bedtime)  latanoprost 0.005% ophthalmic solution: 1 drop(s) to each affected eye once a day (at bedtime)  metoclopramide 10 mg oral tablet: 1 tab(s) orally 3 times a day  Metoprolol Tartrate 25 mg oral tablet: 0.5 tab(s) orally every 12 hours  pantoprazole 40 mg oral delayed release tablet: 1 tab(s) orally once a day (before a meal)  prednisoLONE acetate 1% ophthalmic suspension: 1 drop(s) to each affected eye once a day  pyRIDostigmine 60 mg oral tablet: 1 tab(s) orally once a day  tamsulosin 0.4 mg oral capsule: 1 cap(s) orally once a day (at bedtime)   aspirin 81 mg oral delayed release tablet: 1 tab(s) orally once a day  Bactrim  mg-160 mg oral tablet: 1 tab(s) orally 2 times a day  Calcium 600+D 600 mg-200 intl units oral tablet: 1 tab(s) orally 2 times a day  CellCept 500 mg oral tablet: 2 tab(s) orally 2 times a day  Centrum Silver oral tablet: 1 tab(s) orally once a day  gabapentin 100 mg oral capsule: 2 cap(s) orally once a day (at bedtime)  latanoprost 0.005% ophthalmic solution: 1 drop(s) to each affected eye once a day (at bedtime)  metoclopramide 10 mg oral tablet: 1 tab(s) orally 3 times a day  Metoprolol Tartrate 25 mg oral tablet: 0.5 tab(s) orally every 12 hours  pantoprazole 40 mg oral delayed release tablet: 1 tab(s) orally once a day (before a meal)  prednisoLONE acetate 1% ophthalmic suspension: 1 drop(s) to each affected eye once a day  pyRIDostigmine 60 mg oral tablet: 1 tab(s) orally 4 times a day  tamsulosin 0.4 mg oral capsule: 1 cap(s) orally once a day (at bedtime)

## 2024-02-15 NOTE — DISCHARGE NOTE NURSING/CASE MANAGEMENT/SOCIAL WORK - NSDCFUADDAPPT_GEN_ALL_CORE_FT
-Please follow up with Dr. Evangelista on 2/29/24 at 1:45pm.  -Please follow up with your cardiologist within 1 week of discharge.   -Out office will follow up with you regarding your urine culture. They will call you if you need to take the antibiotic prescribed.   You had a hiatal hernia repair with Dr. Evangelista. Please follow these instructions on discharge:  1. You have small incisions on your abdomen that were closed with skin glue. The skin glue will peel on its own.   2. Shower daily. Wash incisions with soap and water. No rubbing of the incisions. Pat dry.  3. Continue a full liquid diet for 2-3 days from discharge. If you are tolerating full liquids, you may start soft foods but no red meats. Cut food into small bite sized portions. Smaller meals throughout the day are better than large meals. Continue this until your follow up visit.  4. Avoid foods that trigger heartburn (fatty or fried foods, etc).  5. Continue to use your incentive spirometer, deep breathe, cough, and walk often. [place] more frequent walks are better.  6. No heavy lifting for two weeks after surgery.

## 2024-02-15 NOTE — DISCHARGE NOTE PROVIDER - NSDCFUSCHEDAPPT_GEN_ALL_CORE_FT
[facility]  DISPENSARY [address]  Scheduled Appointment: 03/12/2024    Gennaro Hi  [facility]  [address]  Scheduled Appointment: 05/06/2024     Juni Evangelista  [facility]  [place] [address]  Scheduled Appointment: 02/29/2024    [facility]  DISPENSARY [address]  Scheduled Appointment: 03/12/2024    Gennaro Hi  [facility]  [address]  Scheduled Appointment: 05/06/2024

## 2024-02-15 NOTE — PROGRESS NOTE ADULT - SUBJECTIVE AND OBJECTIVE BOX
Pertinent events of the past 24 hours: Pt with 4 episodes of diarrhea, also c/o burning on urination with blood in urine. Imodium given for diarrhea. UA/cx sent. CT esophagram with no evidence of contrast leak.     Subjective:  Patient seen and examined, POD 2 Robotic Hiatal hernia repair with Topet Fundoplication  Patient sitting on the side of the bed, in NAD. +Tolerating diet. +Passing BMs since surgery. +Pain currently controlled. Denies fevers, chills, lightheadedness, dizziness, HA, CP, palpitations, SOB, cough, abdominal pain, N/V, diarrhea, numbness/tingling in extremities, or any other acute complaints. ROS negative x 10 systems except as noted above        PAST MEDICAL & SURGICAL HISTORY:  HLD (hyperlipidemia)    GERD (gastroesophageal reflux disease)    TIA (transient ischemic attack)  2006    Atrial fibrillation, unspecified type    Iron deficiency anemia    Nasal polyp    Benign prostatic hyperplasia, presence of lower urinary tract symptoms unspecified, unspecified morphology    Osteopenia    Glaucoma    Herpes virus disease  right eye    History of myasthenia gravis  2016 , no weakness, in remission      Right hip pain  avasular necrosis of right femoral head 2018    Other cataract  right    H/O nasal polypectomy  wears nasal strip for better breathing    H/O coronary angiogram  3/2012 no stents    Status post placement of implantable loop recorder  4/2018 left chest    Central venous catheter in place, temporary  vortex port implant on right chest    H/O cardiac radiofrequency ablation  May 2018    H/O colonoscopy    H/O endoscopy      VITAL SIGNS  Vital Signs Last 24 Hrs  T(C): 36.9 (02-15-24 @ 00:28), Max: 37.1 (02-14-24 @ 12:00)  T(F): 98.5 (02-15-24 @ 00:28), Max: 98.8 (02-14-24 @ 12:00)  HR: 84 (02-15-24 @ 00:28) (66 - 135)  BP: 158/84 (02-15-24 @ 00:28) (103/67 - 160/85)  RR: 18 (02-15-24 @ 00:28) (12 - 30)  SpO2: 95% (02-15-24 @ 00:28) (93% - 99%)  on (O2)              MEDICATIONS  aspirin enteric coated 81 milliGRAM(s) Oral daily  gabapentin 200 milliGRAM(s) Oral at bedtime  heparin   Injectable 5000 Unit(s) SubCutaneous every 8 hours  latanoprost 0.005% Ophthalmic Solution 1 Drop(s) Both EYES at bedtime  loperamide 2 milliGRAM(s) Oral daily  metoclopramide Injectable 10 milliGRAM(s) IV Push three times a day  metoprolol tartrate 12.5 milliGRAM(s) Oral two times a day  mycophenolate mofetil 1000 milliGRAM(s) Oral two times a day  ondansetron Injectable 4 milliGRAM(s) IV Push three times a day  pantoprazole    Tablet 40 milliGRAM(s) Oral before breakfast  prednisoLONE acetate 1% Suspension 1 Drop(s) Both EYES <User Schedule>  pyridostigmine 60 milliGRAM(s) Oral <User Schedule>  senna 2 Tablet(s) Oral at bedtime PRN  sodium chloride 0.9% lock flush 3 milliLiter(s) IV Push every 8 hours  tamsulosin 0.4 milliGRAM(s) Oral at bedtime      02-13 @ 07:01 - 02-14 @ 07:00  --------------------------------------------------------  IN: 1465 mL / OUT: 2100 mL / NET: -635 mL    02-14 @ 07:01  -  02-15 @ 01:28  --------------------------------------------------------  IN: 1000 mL / OUT: 0 mL / NET: 1000 mL        Weights:  Daily     Daily   Admit Wt: Drug Dosing Weight  Height (cm): 167.6 (14 Feb 2024 00:41)  Weight (kg): 62 (14 Feb 2024 00:41)  BMI (kg/m2): 22.1 (14 Feb 2024 00:41)  BSA (m2): 1.7 (14 Feb 2024 00:41)    LABS  02-14    137  |  103  |  10.8  ----------------------------<  86  4.1   |  23.0  |  0.65    Ca    8.4      14 Feb 2024 02:00  Phos  4.5     02-14  Mg     1.9     02-14                                   13.2   9.27  )-----------( 231      ( 14 Feb 2024 02:00 )             39.8          PT/INR - ( 13 Feb 2024 02:34 )   PT: 13.7 sec;   INR: 1.24 ratio         PTT - ( 13 Feb 2024 02:34 )  PTT:28.5 sec        DIAGNOSTICS:  All laboratory results, radiology and medications reviewed.    < from: CT Chest w/ Oral Cont (02.13.24 @ 15:51) >    IMPRESSION:    1.  Bilateral pleural effusions and passive atelectasis.  2.  No extravasation of the oral contrast from the esophagus into the   mediastinum.  3.  Bilateral pneumothoraces.  4.  Pneumomediastinum.  5.  Free air in the upper abdomen.  6.  The fracture of the anterior-inferior endplate of the L1 vertebral   body is new since 8/26/2023.    --- End of Report ---        < end of copied text >      PHYSICAL EXAM  General: well nourished, well developed, no acute distress  Neurology: *alert and oriented x 3, nonfocal, no gross deficits  Respiratory: clear to auscultation bilaterally*  CV: regular rate and rhythm, normal S1, S2*  Abdomen: soft, nontender, nondistended, positive bowel sounds, last bowel movement   Extremities: warm, well perfused. no edema*. + DP pulses  Incisions: midline sternal incision, + mepilex, c/d/i. sternum stable.  Chest tubes:   Epicardial Wires:    > EPM (settings) / isolated       Pertinent events of the past 24 hours: Pt with 4 episodes of diarrhea, also c/o burning on urination with blood in urine. Imodium given for diarrhea. UA/cx sent.   Subjective:  Patient seen and examined, POD 2 Robotic Hiatal hernia repair with Topet Fundoplication  Patient sitting on the side of the bed, in NAD. +Tolerating diet. +Passing BMs since surgery. +Pain currently controlled. Denies fevers, chills, lightheadedness, dizziness, HA, CP, palpitations, SOB, cough, abdominal pain, N/V, diarrhea, numbness/tingling in extremities, or any other acute complaints. ROS negative x 10 systems except as noted above        PAST MEDICAL & SURGICAL HISTORY:  HLD (hyperlipidemia)    GERD (gastroesophageal reflux disease)    TIA (transient ischemic attack)  2006    Atrial fibrillation, unspecified type    Iron deficiency anemia    Nasal polyp    Benign prostatic hyperplasia, presence of lower urinary tract symptoms unspecified, unspecified morphology    Osteopenia    Glaucoma    Herpes virus disease  right eye    History of myasthenia gravis  2016 , no weakness, in remission      Right hip pain  avasular necrosis of right femoral head 2018    Other cataract  right    H/O nasal polypectomy  wears nasal strip for better breathing    H/O coronary angiogram  3/2012 no stents    Status post placement of implantable loop recorder  4/2018 left chest    Central venous catheter in place, temporary  vortex port implant on right chest    H/O cardiac radiofrequency ablation  May 2018    H/O colonoscopy    H/O endoscopy      VITAL SIGNS  Vital Signs Last 24 Hrs  T(C): 36.9 (02-15-24 @ 00:28), Max: 37.1 (02-14-24 @ 12:00)  T(F): 98.5 (02-15-24 @ 00:28), Max: 98.8 (02-14-24 @ 12:00)  HR: 84 (02-15-24 @ 00:28) (66 - 135)  BP: 158/84 (02-15-24 @ 00:28) (103/67 - 160/85)  RR: 18 (02-15-24 @ 00:28) (12 - 30)  SpO2: 95% (02-15-24 @ 00:28) (93% - 99%)  on (O2)              MEDICATIONS  aspirin enteric coated 81 milliGRAM(s) Oral daily  gabapentin 200 milliGRAM(s) Oral at bedtime  heparin   Injectable 5000 Unit(s) SubCutaneous every 8 hours  latanoprost 0.005% Ophthalmic Solution 1 Drop(s) Both EYES at bedtime  loperamide 2 milliGRAM(s) Oral daily  metoclopramide Injectable 10 milliGRAM(s) IV Push three times a day  metoprolol tartrate 12.5 milliGRAM(s) Oral two times a day  mycophenolate mofetil 1000 milliGRAM(s) Oral two times a day  ondansetron Injectable 4 milliGRAM(s) IV Push three times a day  pantoprazole    Tablet 40 milliGRAM(s) Oral before breakfast  prednisoLONE acetate 1% Suspension 1 Drop(s) Both EYES <User Schedule>  pyridostigmine 60 milliGRAM(s) Oral <User Schedule>  senna 2 Tablet(s) Oral at bedtime PRN  sodium chloride 0.9% lock flush 3 milliLiter(s) IV Push every 8 hours  tamsulosin 0.4 milliGRAM(s) Oral at bedtime      02-13 @ 07:01 - 02-14 @ 07:00  --------------------------------------------------------  IN: 1465 mL / OUT: 2100 mL / NET: -635 mL    02-14 @ 07:01 - 02-15 @ 01:28  --------------------------------------------------------  IN: 1000 mL / OUT: 0 mL / NET: 1000 mL        Weights:  Daily     Daily   Admit Wt: Drug Dosing Weight  Height (cm): 167.6 (14 Feb 2024 00:41)  Weight (kg): 62 (14 Feb 2024 00:41)  BMI (kg/m2): 22.1 (14 Feb 2024 00:41)  BSA (m2): 1.7 (14 Feb 2024 00:41)    LABS  02-14    137  |  103  |  10.8  ----------------------------<  86  4.1   |  23.0  |  0.65    Ca    8.4      14 Feb 2024 02:00  Phos  4.5     02-14  Mg     1.9     02-14                                   13.2   9.27  )-----------( 231      ( 14 Feb 2024 02:00 )             39.8          PT/INR - ( 13 Feb 2024 02:34 )   PT: 13.7 sec;   INR: 1.24 ratio         PTT - ( 13 Feb 2024 02:34 )  PTT:28.5 sec        DIAGNOSTICS:  All laboratory results, radiology and medications reviewed.    < from: CT Chest w/ Oral Cont (02.13.24 @ 15:51) >    IMPRESSION:    1.  Bilateral pleural effusions and passive atelectasis.  2.  No extravasation of the oral contrast from the esophagus into the   mediastinum.  3.  Bilateral pneumothoraces.  4.  Pneumomediastinum.  5.  Free air in the upper abdomen.  6.  The fracture of the anterior-inferior endplate of the L1 vertebral   body is new since 8/26/2023.    --- End of Report ---        < end of copied text >      PHYSICAL EXAM  General: well nourished, well developed, no acute distress  Neurology: alert and oriented x 3, nonfocal, no gross deficits  Respiratory: clear to auscultation bilaterally, no wheezing, no accessory muscle use noted  CV: regular rate and rhythm, normal S1, S2  Abdomen: soft, nontender, nondistended, + bowel sounds  Extremities: warm, well perfused. no edema, + DP pulses  Incisions: robotic surgical incisions c/d/i, no evidence of bleeding, redness

## 2024-02-15 NOTE — DISCHARGE NOTE PROVIDER - HOSPITAL COURSE
78 year old male PMH includes TIA, a-fib, anemia, esophageal dysmotility (and stricture vs Schatzki ring s/p esophageal dilation x 3 due to hiccups, bubble and vomiting - Minesh Solares [place]), GERD, Myasthenia Gravis, hiatal hernia. Underwent elective  upper endoscopy robot assisted hiatal hernia repair with toupet fundoplication on 2/12/24 with Dr. Evangelista.  POD1 advanced to full liquid diet, tolerating. POD2 patient with Afib RVR in AM, self converted, cardiology consulted, no A/C given watchmen, BB started and tolerating. Patient c/o burining urination and frequency, prone to UTI, U/A negative, urince cx pending. Today POD3, patient feels well, he is ambulating on RA, tolerating PO diet, offers no acute complaints. Per Dr. Evangelista patient is ready for discharge. Will send patient home with Bactrim x 7 days, office will f/u urine cx and tell patient if he needs to start abx. patient will follow up with is own cardiologist within 1 week of discharge.

## 2024-02-15 NOTE — DISCHARGE NOTE PROVIDER - CARE PROVIDER_API CALL
Juni Evangelista  Thoracic Surgery  [address] [place] [id number]  Phone: [phone]  Fax: [phone]  Follow Up Time:

## 2024-02-15 NOTE — DISCHARGE NOTE NURSING/CASE MANAGEMENT/SOCIAL WORK - NSDCPEFALRISK_GEN_ALL_CORE
For information on Fall & Injury Prevention, visit: [email or web address] OR  [email or web address] OR  https://www.cdc.gov/steadi/patient.html

## 2024-02-15 NOTE — PROGRESS NOTE ADULT - PROBLEM SELECTOR PLAN 4
HSQ and SCDs for DVT prophylaxis  Protonix for GI prophylaxis  Bowel regimen once tolerating diet

## 2024-02-15 NOTE — PROGRESS NOTE ADULT - ASSESSMENT
Patient is a 78 year old male PMH includes TIA, a-fib, anemia, esophageal dysmotility (and stricture vs Schatzki ring s/p esophageal dilation x 3 due to hiccups, bubble and vomiting - Minesh Solares [place]), GERD, Myasthenia Gravis, hiatal hernia. Underwent elective  upper endoscopy robot assisted hiatal hernia repair with toupet fundoplication on 2/12/24 with Dr. Evangelista.
Patient is a 78 year old male PMH includes TIA, a-fib, anemia, esophageal dysmotility (and stricture vs Schatzki ring s/p esophageal dilation x 3 due to hiccups, bubble and vomiting - Minesh Solares [place]), GERD, Myasthenia Gravis, hiatal hernia. Underwent elective  upper endoscopy robot assisted hiatal hernia repair with toupet fundoplication on 2/12/24 with Dr. Evangelista.
Patient is a 78 year old male PMH includes TIA, a-fib, anemia, esophageal dysmotility (and stricture vs Schatzki ring s/p esophageal dilation x 3 due to hiccups, bubble and vomiting - Minesh Solares [place]), GERD, Myasthenia Gravis, hiatal hernia. Underwent elective  upper endoscopy robot assisted hiatal hernia repair with toupet fundoplication on 2/12/24 with Dr. Evangelista.

## 2024-02-15 NOTE — PROGRESS NOTE ADULT - PROBLEM SELECTOR PLAN 2
Continue mestinon at home dose and frequency

## 2024-02-21 NOTE — H&P PST ADULT - EXTREMITIES
[FreeTextEntry1] : This is a 77 yo M who presents for neurosurgical revisit for CT myelogram review. He has a history of L1-2, L3-5 decompression with L3-4 and L4-5 coflex which was performed in December 2022. Post-operatively he notes significant improvement with regards to neurogenic claudication with a near complete resolution of his right lower extremity pain complaints as well as fatigue within the bilateral lower extremities with ambulation. What remains is a component of isolated lumbago, which would likely not be rectified via laminectomy. I have reviewed his updated CT myelogram and he is aware of the adequate decompression post-operatively. There is a significant amount of remaining degeneration, facet arthropathy which can lead to isolated lumbago. Patient is not interested in spinal fusion and we have outlined the high risk for such consideration without guarantee that his presenting complaints would be alleviated.  CT Myelogram 2/2024- 1.  Lumbar myelogram 2.  Interspinous spacers L3-4 and L4-5 3.  Previous kyphoplasty L2. Mild stable loss of height L2 and lytic stable changes of the vertebral body 4.  L3-L4: Severe lateral canal stenosis, foraminal stenosis, secondary to stable facet hypertrophy 5.  Grade 1 stable L4-L5 spondylolisthesis (compared with 2022) moderate severe spinal and severe right greater than left foraminal stenosis 6.  Overall, similar findings to a previous myelogram of 10/2022 with slight improvement of the degree of stenosis posterior to the L4 vertebral body.  PHYSICAL EXAM:   Constitutional: Well appearing, no distress HEENT: Normocephalic Atraumatic Psychiatric: Alert and oriented to all spheres, normal mood Pulmonary: No respiratory distress  Neurologic:  CN II-XII grossly intact Palpation: (+) lumbar paravertebral tenderness to palpation Strength: Full strength in all major muscle groups Sensation: Full sensation to light touch in all extremities Reflexes:   2+ patellar  2+ ankle jerk  ROM limited in flex/ext  Signs: SLR negative  Gait: steady, walking w/o assistance.
detailed exam

## 2024-02-23 LAB — SURGICAL PATHOLOGY STUDY: SIGNIFICANT CHANGE UP

## 2024-02-24 NOTE — CHART NOTE - NSCHARTNOTEFT_GEN_A_CORE
Clarification  There is No relationship between the Afib and Nissen fundoplication surgery, patient has history of afib.  During admission had an episode of tachycardia which resolved.  Patient not on anticoagulation due to watchman procedure in the past

## 2024-02-27 ENCOUNTER — APPOINTMENT (OUTPATIENT)
Dept: PHARMACY | Facility: CLINIC | Age: 79
End: 2024-02-27
Payer: MEDICARE

## 2024-02-27 PROCEDURE — V5299A: CUSTOM

## 2024-02-28 NOTE — CDI QUERY NOTE - NSCDIOTHERTXTBX_GEN_ALL_CORE_HH
Post-operative Afib is documented in the chart but cannot to be indexed with coding.   Please clarify if there is a relationship between the Afib and the Nissen fundoplication surgery.    - Afib due to Nissen fundoplication surgery  - No relationship between the Afib and Nissen fundoplication surgery  - Other ( please specify)  - Not clinically significant    Documentation    •? PROCEDURES:  Nissen fundoplication, laparoscopic, robotic assisted 12-Feb-2024 1    2/14- Cardio consult- ·  Problem: Atrial fibrillation.   ·  Recommendation: - Patient with a history of atrial fibrillation s/p ablation and Watchman.   - MCOT 09/23 with Afib burden of 1%.   - Patient with post-operative Afib with RVR, already converted back to NSR.   - Patient wants to defer AV nodals at this time as very self-limited.   - No need for AC at this time as with history of Watchman.   - Continue telemetry monitoring        2/15- D/C note-POD2 patient with Afib RVR in AM, self converted, cardiology consulted, no A/C given watchmen, BB started and tolerating.
Please clarify if pleural effusion and atelectasis are clinically significant and if so, the treatment provided.  Thank you.    - Pleural effusion and atelectasis are clinically significant ( please specify treatment)  - Pleural effusion and atelectasis are not clinically significant after study.  - Other ( please specify)    Documentation    2/14 Crit Care- Today:  No acute events-  -CT Chest w/ Oral Cont (02.13.24 )  1.  Bilateral pleural effusions and passive atelectasis.    < from: Xray Chest 1 View- PORTABLE-Routine (Xray Chest 1 View- PORTABLE-Routine in AM.) (02.14.24 @ 06:55) >    Frontal expiratory view of the chest shows the heart to be similar in   size. The lungs show slight clearing of the left base with small left   effusion. Small amount of free air is seen under the diaphragm and there   is no evidence of pneumothorax nor right pleural effusion.    IMPRESSION:  Left base clearing.    < end of copied text >    < from: Xray Chest 1 View- PORTABLE-Routine (Xray Chest 1 View- PORTABLE-Routine in AM.) (02.15.24 @ 05:41) >    IMPRESSION: Right retrocardiac platelike atelectasis.    Continued left retrocardiac opacity which could be due to atelectasis,   pleural fluid with associated passive atelectasis and/or pneumonia.    Trace right and small left pleural effusions.    Stable bilateral small apical pneumothoraces.    Pneumoperitoneum again seen.    < end of copied text >

## 2024-02-29 ENCOUNTER — APPOINTMENT (OUTPATIENT)
Dept: THORACIC SURGERY | Facility: CLINIC | Age: 79
End: 2024-02-29
Payer: MEDICARE

## 2024-02-29 VITALS
HEIGHT: 66 IN | RESPIRATION RATE: 16 BRPM | OXYGEN SATURATION: 100 % | SYSTOLIC BLOOD PRESSURE: 110 MMHG | BODY MASS INDEX: 20.09 KG/M2 | DIASTOLIC BLOOD PRESSURE: 68 MMHG | HEART RATE: 58 BPM | WEIGHT: 125 LBS

## 2024-02-29 PROCEDURE — 99024 POSTOP FOLLOW-UP VISIT: CPT

## 2024-02-29 RX ORDER — PANTOPRAZOLE 40 MG/1
40 TABLET, DELAYED RELEASE ORAL
Refills: 0 | Status: ACTIVE | COMMUNITY

## 2024-02-29 RX ORDER — PANCRELIPASE 120000; 24000; 76000 [USP'U]/1; [USP'U]/1; [USP'U]/1
24000-76000 CAPSULE, DELAYED RELEASE PELLETS ORAL
Refills: 0 | Status: ACTIVE | COMMUNITY
Start: 2024-02-29

## 2024-02-29 RX ORDER — METOPROLOL SUCCINATE 25 MG/1
25 TABLET, EXTENDED RELEASE ORAL
Refills: 0 | Status: ACTIVE | COMMUNITY

## 2024-02-29 RX ORDER — LIDOCAINE 5% 700 MG/1
5 PATCH TOPICAL
Qty: 30 | Refills: 3 | Status: COMPLETED | COMMUNITY
Start: 2023-01-04 | End: 2024-02-29

## 2024-02-29 RX ORDER — OMEPRAZOLE 40 MG/1
40 CAPSULE, DELAYED RELEASE ORAL
Refills: 0 | Status: COMPLETED | COMMUNITY
End: 2024-02-29

## 2024-02-29 NOTE — PHYSICAL EXAM
[Respiration, Rhythm And Depth] : normal respiratory rhythm and effort [Auscultation Breath Sounds / Voice Sounds] : lungs were clear to auscultation bilaterally [Heart Rate And Rhythm] : heart rate was normal and rhythm regular [Site: ___] : Site: [unfilled] [Dry] : dry [Clean] : clean [Healing Well] : healing well

## 2024-03-01 NOTE — ASSESSMENT
[FreeTextEntry1] : Mr Haji presents to the office today accompanied by his wife for a post operative clinical evaluation. Overall he is improving. He has some mild degree of GERD symptoms mainly in the evenings after he's been asleep for a few hours. This is not as profound as pre operative. I believe that given his history of profound GERD this is a phantom reflux. I am recommending that he continue to monitor what he eats recording in a dietary journal as well as discontinuing PPI therapy.   PLAN: - Discontinue Pantoprazole  - Anticipate EGD in 6 months     I, Dr. Evangelista personally performed the evaluation and management (E/M) services for this patient. That E/M includes conducting the initial examination, assessing all conditions, and establishing the plan of care. Today, Julius Matute NP was here to observe my evaluation and management services for this patient.

## 2024-03-01 NOTE — REASON FOR VISIT
[Spouse] : spouse [de-identified] : Upper endoscopy with robot-assisted hiatal hernia repair with a Toupet fundoplication. [de-identified] : 2/12/24 [de-identified] :  78 year old male PMH includes TIA, a-fib, anemia, esophageal dysmotility (and stricture vs Schatzki ring s/p esophageal dilation x 3 due to hiccups, bubble and vomiting - Minesh Solares [place]), GERD, Myasthenia Gravis, hiatal hernia. Underwent elective  upper endoscopy robot assisted hiatal hernia repair with toupet fundoplication on 2/12/24 with Dr. Evangelista.  POD1 advanced to full liquid diet, tolerating. POD2 patient with Afib RVR in AM, self converted, cardiology consulted, no A/C given watchmen, BB started and tolerating. Patient c/o burining urination and frequency, prone to UTI, U/A negative, urince cx pending. Today POD3, patient feels well, he is ambulating on RA, tolerating PO diet, offers no acute complaints. Per Dr. Evangelista patient is ready for discharge. Will send patient home with Bactrim x 7 days, office will f/u urine cx and tell patient if he needs to start abx. patient will follow up with is own cardiologist within 1 week of discharge.  He poffers complaints of continued mild reflux mostly in the evenings that wakes him from sleep. He is not eating after 7 pm and it occurs around midnight. He has no vomitting or nausea and denies any dysphagia

## 2024-03-01 NOTE — DISCUSSION/SUMMARY
[Doing Well] : is doing well [Excellent Pain Control] : has excellent pain control [No Sign of Infection] : is showing no signs of infection [FreeTextEntry1] : Accession:                             [id number]  Collected Date/Time:                   2/12/2024 12:00 EST Received Date/Time:                    2/12/2024 12:31 EST  Surgical Pathology Report - Auth (Verified)  Specimen(s) Submitted 1  Intra Abdominal Fat Hernia Sac  Final Diagnosis Intra-abdominal fat hernia sac: Fibrofatty tissue with hemorrhage and fibrosis.  Verified by: Saran Garcias MD (Electronic Signature) Reported on: 02/23/24 16:34 EST

## 2024-03-06 ENCOUNTER — OFFICE (OUTPATIENT)
Dept: URBAN - METROPOLITAN AREA CLINIC 109 | Facility: CLINIC | Age: 79
Setting detail: OPHTHALMOLOGY
End: 2024-03-06
Payer: MEDICARE

## 2024-03-06 DIAGNOSIS — B00.52: ICD-10-CM

## 2024-03-06 DIAGNOSIS — H26.492: ICD-10-CM

## 2024-03-06 DIAGNOSIS — H40.043: ICD-10-CM

## 2024-03-06 DIAGNOSIS — H52.4: ICD-10-CM

## 2024-03-06 PROCEDURE — 92015 DETERMINE REFRACTIVE STATE: CPT | Performed by: OPHTHALMOLOGY

## 2024-03-06 PROCEDURE — 99213 OFFICE O/P EST LOW 20 MIN: CPT | Performed by: OPHTHALMOLOGY

## 2024-03-06 PROCEDURE — 92083 EXTENDED VISUAL FIELD XM: CPT | Performed by: OPHTHALMOLOGY

## 2024-03-06 PROCEDURE — 92133 CPTRZD OPH DX IMG PST SGM ON: CPT | Performed by: OPHTHALMOLOGY

## 2024-03-06 ASSESSMENT — REFRACTION_MANIFEST
OD_VA1: 20/20
OD_ADD: +2.50
OD_SPHERE: +0.50
OS_AXIS: 085
OD_CYLINDER: -0.75
OS_CYLINDER: -0.75
OD_AXIS: 090
OS_VA1: 20/20
OS_ADD: +2.50
OS_SPHERE: +1.00

## 2024-03-06 ASSESSMENT — REFRACTION_CURRENTRX
OD_CYLINDER: -0.25
OD_AXIS: 075
OS_SPHERE: +0.50
OD_ADD: +2.50
OS_OVR_VA: 20/
OS_VPRISM_DIRECTION: BF
OD_SPHERE: -0.25
OD_OVR_VA: 20/
OD_VPRISM_DIRECTION: BF
OS_CYLINDER: SPH
OS_ADD: +2.50

## 2024-03-06 ASSESSMENT — SPHEQUIV_DERIVED
OD_SPHEQUIV: 0.125
OS_SPHEQUIV: 0.625

## 2024-03-13 ENCOUNTER — NON-APPOINTMENT (OUTPATIENT)
Age: 79
End: 2024-03-13

## 2024-03-21 PROBLEM — K52.9 CHRONIC DIARRHEA OF UNKNOWN ORIGIN: Status: ACTIVE | Noted: 2024-02-29

## 2024-03-21 PROBLEM — K21.9 GASTRO-ESOPHAGEAL REFLUX DISEASE WITHOUT ESOPHAGITIS: Status: ACTIVE | Noted: 2022-09-09

## 2024-03-28 ENCOUNTER — APPOINTMENT (OUTPATIENT)
Dept: THORACIC SURGERY | Facility: CLINIC | Age: 79
End: 2024-03-28
Payer: MEDICARE

## 2024-03-28 VITALS
OXYGEN SATURATION: 100 % | BODY MASS INDEX: 20.09 KG/M2 | WEIGHT: 125 LBS | DIASTOLIC BLOOD PRESSURE: 72 MMHG | HEIGHT: 66 IN | HEART RATE: 67 BPM | RESPIRATION RATE: 16 BRPM | SYSTOLIC BLOOD PRESSURE: 114 MMHG

## 2024-03-28 DIAGNOSIS — K21.9 GASTRO-ESOPHAGEAL REFLUX DISEASE W/OUT ESOPHAGITIS: ICD-10-CM

## 2024-03-28 DIAGNOSIS — K52.9 NONINFECTIVE GASTROENTERITIS AND COLITIS, UNSPECIFIED: ICD-10-CM

## 2024-03-28 PROCEDURE — 99024 POSTOP FOLLOW-UP VISIT: CPT

## 2024-03-28 NOTE — DISCUSSION/SUMMARY
[Slow Progress] : is progressing slowly [Excellent Pain Control] : has excellent pain control [No Sign of Infection] : is showing no signs of infection [FreeTextEntry1] : Accession: [id number]  Collected Date/Time: 2/12/2024 12:00 EST Received Date/Time: 2/12/2024 12:31 EST  Surgical Pathology Report - Auth (Verified)  Specimen(s) Submitted 1 Intra Abdominal Fat Hernia Sac  Final Diagnosis Intra-abdominal fat hernia sac: Fibrofatty tissue with hemorrhage and fibrosis.  Verified by: Saran Garcias MD (Electronic Signature) Reported on: 02/23/24 16:34 EST.

## 2024-03-28 NOTE — REASON FOR VISIT
[de-identified] : Upper endoscopy with robot-assisted hiatal hernia repair with a Toupet fundoplication [de-identified] : 2/12/24 [de-identified] : 78 year old male PMH includes TIA, a-fib, anemia, esophageal dysmotility (and stricture vs Schatzki ring s/p esophageal dilation x 3 due to hiccups, bubble and vomiting - Minesh Solares [place]), GERD, Myasthenia Gravis, hiatal hernia.    POD1 advanced to full liquid diet, tolerating. POD2 patient with Afib RVR in AM, self converted, cardiology consulted, no A/C given watchmen, BB started and tolerating. Patient c/o burning urination and frequency, prone to UTI, U/A negative, urine cx pending. POD3, patient feels well, he is ambulating on RA, tolerating PO diet, offers no acute complaints.   Postoperatively, he has been experiencing a mild degree of GERD symptoms, mainly in the evenings after he's been asleep for a few hours. I recommended that he continue monitoring what he eats, record a dietary journal, and discontinue PPI therapy. He has since discontinued PPI therapy and remains with a sour taste in his mouth.

## 2024-04-01 ENCOUNTER — NON-APPOINTMENT (OUTPATIENT)
Age: 79
End: 2024-04-01

## 2024-04-01 ENCOUNTER — TRANSCRIPTION ENCOUNTER (OUTPATIENT)
Age: 79
End: 2024-04-01

## 2024-04-05 ENCOUNTER — OUTPATIENT (OUTPATIENT)
Dept: OUTPATIENT SERVICES | Facility: HOSPITAL | Age: 79
LOS: 1 days | End: 2024-04-05
Payer: MEDICARE

## 2024-04-05 ENCOUNTER — TRANSCRIPTION ENCOUNTER (OUTPATIENT)
Age: 79
End: 2024-04-05

## 2024-04-05 ENCOUNTER — RESULT REVIEW (OUTPATIENT)
Age: 79
End: 2024-04-05

## 2024-04-05 VITALS
WEIGHT: 126.1 LBS | TEMPERATURE: 97 F | DIASTOLIC BLOOD PRESSURE: 63 MMHG | RESPIRATION RATE: 12 BRPM | OXYGEN SATURATION: 99 % | HEIGHT: 66 IN | SYSTOLIC BLOOD PRESSURE: 137 MMHG | HEART RATE: 61 BPM

## 2024-04-05 VITALS
DIASTOLIC BLOOD PRESSURE: 72 MMHG | HEART RATE: 58 BPM | OXYGEN SATURATION: 99 % | RESPIRATION RATE: 16 BRPM | SYSTOLIC BLOOD PRESSURE: 128 MMHG

## 2024-04-05 DIAGNOSIS — Z98.890 OTHER SPECIFIED POSTPROCEDURAL STATES: Chronic | ICD-10-CM

## 2024-04-05 DIAGNOSIS — H26.8 OTHER SPECIFIED CATARACT: Chronic | ICD-10-CM

## 2024-04-05 DIAGNOSIS — Z49.01 ENCOUNTER FOR FITTING AND ADJUSTMENT OF EXTRACORPOREAL DIALYSIS CATHETER: ICD-10-CM

## 2024-04-05 DIAGNOSIS — Z95.818 PRESENCE OF OTHER CARDIAC IMPLANTS AND GRAFTS: Chronic | ICD-10-CM

## 2024-04-05 DIAGNOSIS — Z95.828 PRESENCE OF OTHER VASCULAR IMPLANTS AND GRAFTS: Chronic | ICD-10-CM

## 2024-04-05 PROCEDURE — 76937 US GUIDE VASCULAR ACCESS: CPT | Mod: 26

## 2024-04-05 PROCEDURE — 36558 INSERT TUNNELED CV CATH: CPT | Mod: RT

## 2024-04-05 PROCEDURE — 77001 FLUOROGUIDE FOR VEIN DEVICE: CPT | Mod: 26

## 2024-04-05 NOTE — PRE-ANESTHESIA EVALUATION ADULT - SPO2 (%)
Progress Notes by Dreyer, Roman, MD at 04/12/18 05:06 PM     Author:  Dreyer, Roman, MD Service:  (none) Author Type:  Physician     Filed:  04/12/18 05:06 PM Encounter Date:  4/12/2018 Status:  Signed     :  Dreyer, Roman, MD (Physician)            I will review test results with Tyler at his upcoming appointment.  [RD1.1M]    Revision History        User Key Date/Time User Provider Type Action    > RD1.1 04/12/18 05:06 PM Dreyer, Roman, MD Physician Sign    M - Manual             99 No

## 2024-04-05 NOTE — H&P ADULT - HISTORY OF PRESENT ILLNESS
Interventional Radiology    HPI:  78 year old male with PMH includes TIA, a-fib, anemia, esophageal dysmotility (and stricture vs Schatzki ring s/p esophageal dilation x 3 due to hiccups, bubble and vomiting - Minesh Solares [place]), GERD, Myasthenia Gravis.  Underwent elective  upper endoscopy robot assisted hiatal hernia repair with toupet fundoplication on 2/12/24 with Dr. Evangelista.    Review of Systems:   Constitutional: No fever, weight loss, or fatigue  Neurological: No headaches, memory loss, loss of strength, numbness, or tremors  Respiratory: No cough, wheezing, chills or hemoptysis; No shortness of breath  Cardiovascular: No chest pain, palpitations, dizziness, or leg swelling  Gastrointestinal: No abdominal or epigastric pain. No nausea, vomiting, or hematemesis; No diarrhea or constipation. No melena or hematochezia.  Skin: No itching, burning, rashes, or lesions   Musculoskeletal: No joint pain or swelling; No muscle, back, or extremity pain    Allergies: immune globulin intravenous (Unknown)  penicillins (Rash)    Medications (Abx/Cardiac/Anticoagulation/Blood Products)      Data:  167.6  57.2  T(C): 36  HR: 61  BP: 137/63  RR: 12  SpO2: 99%            Physical Exam  General: No acute distress, nontoxic, A&Ox3  Chest: Non labored breathing  Abdomen: Non-distended, non-tender, no peritoneal signs  Extremities: No swelling, warm, ____femoral/dp/pt pulses + ____. No pedal edema or calf tenderness notes.  Skin: No rashes or lesions    RADIOLOGY & ADDITIONAL TESTS:    Imaging Reviewed    H & P Note Reviewed from:1/24/24.     Plan: 79y Male presents for Tunneled hemodialysis catheter placement.     -Risks/Benefits/alternatives explained with the patient and/or healthcare proxy and witnessed informed consent obtained.    Interventional Radiology    HPI:  78 year old male with PMH includes TIA, a-fib (hx of a watchman device- has been removed), anemia, esophageal dysmotility (and stricture vs Schatzki ring s/p esophageal dilation x 3 due to hiccups, bubble and vomiting - Minesh Solraes [place]), s/p elective  upper endoscopy robot assisted hiatal hernia repair with toupet fundoplication on 2/12/24 with Dr. Evangelista, GERD, Myasthenia Gravis (requiring plasmapheresis- previously had right chest vortex port which has since been removed ~ 2 years ago as he was in remission). Now with recurrance of his myastinia gravis and will be restarting of plasmapheresis Monday 4/8/24. Presents to IR today for tunneled  hemodialysis catheter placement.    Review of Systems:   Constitutional: No fever, weight loss, or fatigue  Neurological: No headaches, memory loss, loss of strength, numbness, or tremors  Respiratory: No cough, wheezing, chills or hemoptysis; No shortness of breath  Cardiovascular: No chest pain, palpitations, dizziness, or leg swelling  Gastrointestinal: No abdominal or epigastric pain. No nausea, vomiting, or hematemesis; No diarrhea or constipation. No melena or hematochezia.  Skin: No itching, burning, rashes, or lesions   Musculoskeletal: No joint pain or swelling; No muscle, back, or extremity pain    Allergies: immune globulin intravenous (Unknown)  penicillins (Rash)    Medications (Abx/Cardiac/Anticoagulation/Blood Products)      Data:  167.6  57.2  T(C): 36  HR: 61  BP: 137/63  RR: 12  SpO2: 99%            Physical Exam  General: No acute distress, nontoxic, A&Ox3  Chest: Non labored breathing  Abdomen: Non-distended, non-tender, no peritoneal signs  Extremities: No swelling, warm, pulses +.   Skin: No rashes or lesions    RADIOLOGY & ADDITIONAL TESTS:    Imaging Reviewed    H & P Note Reviewed from:1/24/24.     Plan: 79y Male presents for Tunneled hemodialysis catheter placement for plasmapheresis.     -Risks/Benefits/alternatives explained with the patient and/or healthcare proxy and witnessed informed consent obtained.

## 2024-04-05 NOTE — ASU DISCHARGE PLAN (ADULT/PEDIATRIC) - ASU DC SPECIAL INSTRUCTIONSFT
Tunneled hemodialysis catheter placement    Discharge Instructions  - You have had a Tunneled hemodialysis catheter implanted in your chest.   - The port is ready for use.  - You may shower in 48 hours- but keep site clean and dry. No soaking or swimming.   - Keep the area covered and dry for while catheter is in place. It may be removed by a plasmapheresis nurse as needed for treatment.  - Do not perform any heavy lifting or put tension on the area for the next week or until the site is healed.  - You may resume your normal diet.  - You may resume your normal medications however you should wait 48 hours before restarting aspirin, plavix, or blood thinners.  - It is normal to experience some pain over the site for the next few days. You may take apply ice to the area (20 minutes on, 20 minutes off) and take Tylenol for that pain. Do not take more frequently than every 6 hours and do not exceed more than 3000mg of Tylenol in a 24 hour period.    - You were given conscious sedation which may make you drowsy, therefore you need someone to stay with you until the morning following the procedure.  - Do not drive, engage in heavy lifting or strenuous activity, or drink any alcoholic beverages for the next 24 hours.   - You may resume normal activity in 24 hours.    Notify your primary physician and/or Interventional Radiology IMMEDIATELY if you experience any of the following       - Fever of 100.4F or 38C       - Chills or Rigors/ Shakes       - Swelling and/or Redness in the area around the port       - Worsening Pain       - Blood soaked bandages or worsening bleeding       - Lightheadedness and/or dizziness upon standing       - Chest Pain/ Tightness       - Shortness of Breath       - Difficulty walking    If you have a problem that you believe requires IMMEDIATE attention, please go to your NEAREST Emergency Room. If you believe your problem can safely wait until you speak to a physician, please call Interventional Radiology for any concerns.    Please feel free to contact us at [phone] if any problems arise. After 6PM, Monday through Friday, on weekends and on holidays, please call [phone] and ask for the radiology resident on call to be paged.

## 2024-04-05 NOTE — H&P ADULT - NSICDXPASTSURGICALHX_GEN_ALL_CORE_FT
PAST SURGICAL HISTORY:  Central venous catheter in place, temporary vortex port implant on right chest    H/O cardiac radiofrequency ablation May 2018    H/O colonoscopy     H/O coronary angiogram 3/2012 no stents    H/O endoscopy     H/O nasal polypectomy wears nasal strip for better breathing    Other cataract right    Status post placement of implantable loop recorder 4/2018 left chest

## 2024-04-05 NOTE — ASU DISCHARGE PLAN (ADULT/PEDIATRIC) - NS MD DC FALL RISK RISK
For information on Fall & Injury Prevention, visit: [email or web address] OR  [email or web address] OR  https://www.cdc.gov/steadi/patient.html

## 2024-04-08 ENCOUNTER — OUTPATIENT (OUTPATIENT)
Dept: OUTPATIENT SERVICES | Facility: HOSPITAL | Age: 79
LOS: 1 days | End: 2024-04-08
Payer: MEDICARE

## 2024-04-08 DIAGNOSIS — Z95.818 PRESENCE OF OTHER CARDIAC IMPLANTS AND GRAFTS: Chronic | ICD-10-CM

## 2024-04-08 DIAGNOSIS — Z98.890 OTHER SPECIFIED POSTPROCEDURAL STATES: Chronic | ICD-10-CM

## 2024-04-08 DIAGNOSIS — G70.01 MYASTHENIA GRAVIS WITH (ACUTE) EXACERBATION: ICD-10-CM

## 2024-04-08 DIAGNOSIS — H26.8 OTHER SPECIFIED CATARACT: Chronic | ICD-10-CM

## 2024-04-08 DIAGNOSIS — Z95.828 PRESENCE OF OTHER VASCULAR IMPLANTS AND GRAFTS: Chronic | ICD-10-CM

## 2024-04-08 LAB
HCT VFR BLD CALC: 37.8 % — LOW (ref 39–50)
HGB BLD-MCNC: 12.6 G/DL — LOW (ref 13–17)
MCHC RBC-ENTMCNC: 31.3 PG — SIGNIFICANT CHANGE UP (ref 27–34)
MCHC RBC-ENTMCNC: 33.3 GM/DL — SIGNIFICANT CHANGE UP (ref 32–36)
MCV RBC AUTO: 94 FL — SIGNIFICANT CHANGE UP (ref 80–100)
NRBC # BLD: 0 /100 WBCS — SIGNIFICANT CHANGE UP (ref 0–0)
PLATELET # BLD AUTO: 245 K/UL — SIGNIFICANT CHANGE UP (ref 150–400)
RBC # BLD: 4.02 M/UL — LOW (ref 4.2–5.8)
RBC # FLD: 14.6 % — HIGH (ref 10.3–14.5)
WBC # BLD: 6.14 K/UL — SIGNIFICANT CHANGE UP (ref 3.8–10.5)
WBC # FLD AUTO: 6.14 K/UL — SIGNIFICANT CHANGE UP (ref 3.8–10.5)

## 2024-04-08 PROCEDURE — 36514 APHERESIS PLASMA: CPT

## 2024-04-08 PROCEDURE — 99215 OFFICE O/P EST HI 40 MIN: CPT | Mod: 25

## 2024-04-08 PROCEDURE — 85027 COMPLETE CBC AUTOMATED: CPT

## 2024-04-10 ENCOUNTER — OUTPATIENT (OUTPATIENT)
Dept: OUTPATIENT SERVICES | Facility: HOSPITAL | Age: 79
LOS: 1 days | End: 2024-04-10
Payer: MEDICARE

## 2024-04-10 DIAGNOSIS — H26.8 OTHER SPECIFIED CATARACT: Chronic | ICD-10-CM

## 2024-04-10 DIAGNOSIS — G70.00 MYASTHENIA GRAVIS WITHOUT (ACUTE) EXACERBATION: ICD-10-CM

## 2024-04-10 DIAGNOSIS — Z98.890 OTHER SPECIFIED POSTPROCEDURAL STATES: Chronic | ICD-10-CM

## 2024-04-10 DIAGNOSIS — Z95.818 PRESENCE OF OTHER CARDIAC IMPLANTS AND GRAFTS: Chronic | ICD-10-CM

## 2024-04-10 DIAGNOSIS — Z95.828 PRESENCE OF OTHER VASCULAR IMPLANTS AND GRAFTS: Chronic | ICD-10-CM

## 2024-04-10 LAB
HCT VFR BLD CALC: 38.1 % — LOW (ref 39–50)
HGB BLD-MCNC: 12.5 G/DL — LOW (ref 13–17)
MCHC RBC-ENTMCNC: 31.2 PG — SIGNIFICANT CHANGE UP (ref 27–34)
MCHC RBC-ENTMCNC: 32.8 GM/DL — SIGNIFICANT CHANGE UP (ref 32–36)
MCV RBC AUTO: 95 FL — SIGNIFICANT CHANGE UP (ref 80–100)
NRBC # BLD: 0 /100 WBCS — SIGNIFICANT CHANGE UP (ref 0–0)
PLATELET # BLD AUTO: 226 K/UL — SIGNIFICANT CHANGE UP (ref 150–400)
RBC # BLD: 4.01 M/UL — LOW (ref 4.2–5.8)
RBC # FLD: 14.6 % — HIGH (ref 10.3–14.5)
WBC # BLD: 6.12 K/UL — SIGNIFICANT CHANGE UP (ref 3.8–10.5)
WBC # FLD AUTO: 6.12 K/UL — SIGNIFICANT CHANGE UP (ref 3.8–10.5)

## 2024-04-10 PROCEDURE — 36514 APHERESIS PLASMA: CPT

## 2024-04-11 PROCEDURE — P9041: CPT

## 2024-04-11 PROCEDURE — 36514 APHERESIS PLASMA: CPT

## 2024-04-11 PROCEDURE — 85027 COMPLETE CBC AUTOMATED: CPT

## 2024-04-12 ENCOUNTER — OUTPATIENT (OUTPATIENT)
Dept: OUTPATIENT SERVICES | Facility: HOSPITAL | Age: 79
LOS: 1 days | End: 2024-04-12
Payer: MEDICARE

## 2024-04-12 DIAGNOSIS — G70.01 MYASTHENIA GRAVIS WITH (ACUTE) EXACERBATION: ICD-10-CM

## 2024-04-12 DIAGNOSIS — Z98.890 OTHER SPECIFIED POSTPROCEDURAL STATES: Chronic | ICD-10-CM

## 2024-04-12 LAB
HCT VFR BLD CALC: 38.9 % — LOW (ref 39–50)
HGB BLD-MCNC: 12.8 G/DL — LOW (ref 13–17)
MCHC RBC-ENTMCNC: 31.5 PG — SIGNIFICANT CHANGE UP (ref 27–34)
MCHC RBC-ENTMCNC: 32.9 GM/DL — SIGNIFICANT CHANGE UP (ref 32–36)
MCV RBC AUTO: 95.8 FL — SIGNIFICANT CHANGE UP (ref 80–100)
NRBC # BLD: 0 /100 WBCS — SIGNIFICANT CHANGE UP (ref 0–0)
PLATELET # BLD AUTO: 220 K/UL — SIGNIFICANT CHANGE UP (ref 150–400)
RBC # BLD: 4.06 M/UL — LOW (ref 4.2–5.8)
RBC # FLD: 14.8 % — HIGH (ref 10.3–14.5)
WBC # BLD: 7.53 K/UL — SIGNIFICANT CHANGE UP (ref 3.8–10.5)
WBC # FLD AUTO: 7.53 K/UL — SIGNIFICANT CHANGE UP (ref 3.8–10.5)

## 2024-04-12 PROCEDURE — 36514 APHERESIS PLASMA: CPT

## 2024-04-12 PROCEDURE — 85027 COMPLETE CBC AUTOMATED: CPT

## 2024-04-15 ENCOUNTER — OUTPATIENT (OUTPATIENT)
Dept: OUTPATIENT SERVICES | Facility: HOSPITAL | Age: 79
LOS: 1 days | End: 2024-04-15
Payer: MEDICARE

## 2024-04-15 DIAGNOSIS — Z95.828 PRESENCE OF OTHER VASCULAR IMPLANTS AND GRAFTS: Chronic | ICD-10-CM

## 2024-04-15 DIAGNOSIS — G70.01 MYASTHENIA GRAVIS WITH (ACUTE) EXACERBATION: ICD-10-CM

## 2024-04-15 DIAGNOSIS — Z95.818 PRESENCE OF OTHER CARDIAC IMPLANTS AND GRAFTS: Chronic | ICD-10-CM

## 2024-04-15 DIAGNOSIS — Z98.890 OTHER SPECIFIED POSTPROCEDURAL STATES: Chronic | ICD-10-CM

## 2024-04-15 DIAGNOSIS — H26.8 OTHER SPECIFIED CATARACT: Chronic | ICD-10-CM

## 2024-04-15 LAB
HCT VFR BLD CALC: 36.8 % — LOW (ref 39–50)
HGB BLD-MCNC: 12.3 G/DL — LOW (ref 13–17)
MCHC RBC-ENTMCNC: 31.5 PG — SIGNIFICANT CHANGE UP (ref 27–34)
MCHC RBC-ENTMCNC: 33.4 GM/DL — SIGNIFICANT CHANGE UP (ref 32–36)
MCV RBC AUTO: 94.4 FL — SIGNIFICANT CHANGE UP (ref 80–100)
NRBC # BLD: 0 /100 WBCS — SIGNIFICANT CHANGE UP (ref 0–0)
PLATELET # BLD AUTO: 209 K/UL — SIGNIFICANT CHANGE UP (ref 150–400)
RBC # BLD: 3.9 M/UL — LOW (ref 4.2–5.8)
RBC # FLD: 15.1 % — HIGH (ref 10.3–14.5)
WBC # BLD: 8.43 K/UL — SIGNIFICANT CHANGE UP (ref 3.8–10.5)
WBC # FLD AUTO: 8.43 K/UL — SIGNIFICANT CHANGE UP (ref 3.8–10.5)

## 2024-04-15 PROCEDURE — 36514 APHERESIS PLASMA: CPT

## 2024-04-15 PROCEDURE — 85027 COMPLETE CBC AUTOMATED: CPT

## 2024-04-17 ENCOUNTER — OUTPATIENT (OUTPATIENT)
Dept: OUTPATIENT SERVICES | Facility: HOSPITAL | Age: 79
LOS: 1 days | End: 2024-04-17
Payer: MEDICARE

## 2024-04-17 DIAGNOSIS — G70.00 MYASTHENIA GRAVIS WITHOUT (ACUTE) EXACERBATION: ICD-10-CM

## 2024-04-17 DIAGNOSIS — Z98.890 OTHER SPECIFIED POSTPROCEDURAL STATES: Chronic | ICD-10-CM

## 2024-04-17 DIAGNOSIS — H26.8 OTHER SPECIFIED CATARACT: Chronic | ICD-10-CM

## 2024-04-17 DIAGNOSIS — G70.01 MYASTHENIA GRAVIS WITH (ACUTE) EXACERBATION: ICD-10-CM

## 2024-04-17 DIAGNOSIS — Z45.2 ENCOUNTER FOR ADJUSTMENT AND MANAGEMENT OF VASCULAR ACCESS DEVICE: ICD-10-CM

## 2024-04-17 DIAGNOSIS — Z95.818 PRESENCE OF OTHER CARDIAC IMPLANTS AND GRAFTS: Chronic | ICD-10-CM

## 2024-04-17 DIAGNOSIS — Z95.828 PRESENCE OF OTHER VASCULAR IMPLANTS AND GRAFTS: Chronic | ICD-10-CM

## 2024-04-17 LAB
HCT VFR BLD CALC: 34 % — LOW (ref 39–50)
HGB BLD-MCNC: 11.4 G/DL — LOW (ref 13–17)
MCHC RBC-ENTMCNC: 32 PG — SIGNIFICANT CHANGE UP (ref 27–34)
MCHC RBC-ENTMCNC: 33.5 GM/DL — SIGNIFICANT CHANGE UP (ref 32–36)
MCV RBC AUTO: 95.5 FL — SIGNIFICANT CHANGE UP (ref 80–100)
NRBC # BLD: 0 /100 WBCS — SIGNIFICANT CHANGE UP (ref 0–0)
PLATELET # BLD AUTO: 234 K/UL — SIGNIFICANT CHANGE UP (ref 150–400)
RBC # BLD: 3.56 M/UL — LOW (ref 4.2–5.8)
RBC # FLD: 15 % — HIGH (ref 10.3–14.5)
WBC # BLD: 6.89 K/UL — SIGNIFICANT CHANGE UP (ref 3.8–10.5)
WBC # FLD AUTO: 6.89 K/UL — SIGNIFICANT CHANGE UP (ref 3.8–10.5)

## 2024-04-17 PROCEDURE — 77001 FLUOROGUIDE FOR VEIN DEVICE: CPT

## 2024-04-17 PROCEDURE — 36514 APHERESIS PLASMA: CPT

## 2024-04-17 PROCEDURE — C1894: CPT

## 2024-04-17 PROCEDURE — P9041: CPT

## 2024-04-17 PROCEDURE — 85027 COMPLETE CBC AUTOMATED: CPT

## 2024-04-17 PROCEDURE — 36558 INSERT TUNNELED CV CATH: CPT

## 2024-04-17 PROCEDURE — C1750: CPT

## 2024-04-17 PROCEDURE — C1769: CPT

## 2024-04-17 PROCEDURE — 76937 US GUIDE VASCULAR ACCESS: CPT

## 2024-04-17 PROCEDURE — P9045: CPT

## 2024-04-25 ENCOUNTER — OUTPATIENT (OUTPATIENT)
Dept: OUTPATIENT SERVICES | Facility: HOSPITAL | Age: 79
LOS: 1 days | End: 2024-04-25
Payer: MEDICARE

## 2024-04-25 DIAGNOSIS — Z95.818 PRESENCE OF OTHER CARDIAC IMPLANTS AND GRAFTS: Chronic | ICD-10-CM

## 2024-04-25 DIAGNOSIS — Z98.890 OTHER SPECIFIED POSTPROCEDURAL STATES: Chronic | ICD-10-CM

## 2024-04-25 DIAGNOSIS — G70.01 MYASTHENIA GRAVIS WITH (ACUTE) EXACERBATION: ICD-10-CM

## 2024-04-25 DIAGNOSIS — Z95.828 PRESENCE OF OTHER VASCULAR IMPLANTS AND GRAFTS: Chronic | ICD-10-CM

## 2024-04-25 DIAGNOSIS — H26.8 OTHER SPECIFIED CATARACT: Chronic | ICD-10-CM

## 2024-04-25 PROCEDURE — 96523 IRRIG DRUG DELIVERY DEVICE: CPT

## 2024-04-29 ENCOUNTER — TRANSCRIPTION ENCOUNTER (OUTPATIENT)
Age: 79
End: 2024-04-29

## 2024-05-02 ENCOUNTER — OUTPATIENT (OUTPATIENT)
Dept: OUTPATIENT SERVICES | Facility: HOSPITAL | Age: 79
LOS: 1 days | End: 2024-05-02
Payer: MEDICARE

## 2024-05-02 DIAGNOSIS — Z95.818 PRESENCE OF OTHER CARDIAC IMPLANTS AND GRAFTS: Chronic | ICD-10-CM

## 2024-05-02 DIAGNOSIS — Z98.890 OTHER SPECIFIED POSTPROCEDURAL STATES: Chronic | ICD-10-CM

## 2024-05-02 DIAGNOSIS — H26.8 OTHER SPECIFIED CATARACT: Chronic | ICD-10-CM

## 2024-05-02 DIAGNOSIS — Z95.828 PRESENCE OF OTHER VASCULAR IMPLANTS AND GRAFTS: Chronic | ICD-10-CM

## 2024-05-02 DIAGNOSIS — G70.01 MYASTHENIA GRAVIS WITH (ACUTE) EXACERBATION: ICD-10-CM

## 2024-05-02 PROCEDURE — 96523 IRRIG DRUG DELIVERY DEVICE: CPT

## 2024-05-06 ENCOUNTER — APPOINTMENT (OUTPATIENT)
Dept: OTOLARYNGOLOGY | Facility: CLINIC | Age: 79
End: 2024-05-06
Payer: MEDICARE

## 2024-05-06 VITALS
HEART RATE: 61 BPM | SYSTOLIC BLOOD PRESSURE: 113 MMHG | HEIGHT: 66 IN | BODY MASS INDEX: 19.93 KG/M2 | WEIGHT: 124 LBS | DIASTOLIC BLOOD PRESSURE: 70 MMHG

## 2024-05-06 DIAGNOSIS — K44.9 DIAPHRAGMATIC HERNIA W/OUT OBSTRUCTION OR GANGRENE: ICD-10-CM

## 2024-05-06 DIAGNOSIS — K21.00 DIAPHRAGMATIC HERNIA W/OUT OBSTRUCTION OR GANGRENE: ICD-10-CM

## 2024-05-06 DIAGNOSIS — L29.9 PRURITUS, UNSPECIFIED: ICD-10-CM

## 2024-05-06 DIAGNOSIS — G47.33 OBSTRUCTIVE SLEEP APNEA (ADULT) (PEDIATRIC): ICD-10-CM

## 2024-05-06 DIAGNOSIS — J31.0 CHRONIC RHINITIS: ICD-10-CM

## 2024-05-06 DIAGNOSIS — H90.5 UNSPECIFIED SENSORINEURAL HEARING LOSS: ICD-10-CM

## 2024-05-06 DIAGNOSIS — J34.2 DEVIATED NASAL SEPTUM: ICD-10-CM

## 2024-05-06 PROCEDURE — 99213 OFFICE O/P EST LOW 20 MIN: CPT

## 2024-05-06 RX ORDER — FLUOCINOLONE ACETONIDE 0.11 MG/ML
0.01 OIL AURICULAR (OTIC)
Qty: 1 | Refills: 5 | Status: ACTIVE | COMMUNITY
Start: 2024-05-06 | End: 1900-01-01

## 2024-05-06 NOTE — PHYSICAL EXAM
[Hearing [place] Test (Tuning Fork On Forehead)] : no lateralization of tone [] : septum deviated bilaterally [Midline] : trachea located in midline position [Normal] : orientation to person, place, and time: normal [Hearing Loss Right Only] : normal [Hearing Loss Left Only] : normal [FreeTextEntry9] : dry, flakey debris removed via curettage [de-identified] :  mildly inflamed turbinates [de-identified] : no tonsil tissue [de-identified] : elongated uvula [FreeTextEntry2] :  sinuses nontender to percussion

## 2024-05-06 NOTE — ADDENDUM
[FreeTextEntry1] :  Documented by Néstor Zuñiga acting as scribe for Dr. Hi on 05/06/2024. All Medical record entries made by the Scribe were at my, Dr. Hi, direction and personally dictated by me on 05/06/2024 . I have reviewed the chart and agree that the record accurately reflects my personal performance of the history, physical exam, assessment and plan. I have also personally directed, reviewed, and agreed with the discharge instructions.

## 2024-05-06 NOTE — ASSESSMENT
[FreeTextEntry1] : Reviewed and reconciled medications, allergies, PMHx, PSHx, SocHx, FMHx.  Pt. with h/o myasthenia gravis, bilateral SNHL - wears bilateral amplification, watchman device, a-fib, loop recorder, sleep apnea and sudden left SNHL, reflux, s/p Left Trans-Tympanic Steroid Membrane Injection on 3/30/2020, 4/16/2020 and 5/12/2020 presents today with wife for a follow up. Wife states that his myasthenia gravis acted up about 2 months ago. He denies problems with sleep apnea. He denies using CPAP machine. He states that he has met with audiology because his hearing aid keeps falling out. He states that his left ear always feels itchy.   Physical exam: -left ear canal: dry, flakey debris removed via curettage -no lateralization to tuning forks -deviated septum bilaterally - mid portion of the septum deviated to the left and anterior septum deviated to right -elongated uvula -no tonsil tissue   Plan: -Start Fluocinolone ear drops - 1 drop in the left er every other night -FU in 6 months

## 2024-05-06 NOTE — HISTORY OF PRESENT ILLNESS
[de-identified] : Pt. with h/o myasthenia gravis, bilateral SNHL - wears bilateral amplification, watchman device, a-fib, loop recorder, sleep apnea and sudden left SNHL, reflux, s/p Left Trans-Tympanic Steroid Membrane Injection on 3/30/2020, 4/16/2020 and 5/12/2020 presents today with wife for a follow up. Wife states that his myasthenia gravis acted up about 2 months ago. He denies problems with sleep apnea. He denies using CPAP machine. He states that he has met with audiology because his hearing aid keeps falling out. He states that his left ear always feels itchy.

## 2024-05-09 ENCOUNTER — APPOINTMENT (OUTPATIENT)
Dept: THORACIC SURGERY | Facility: CLINIC | Age: 79
End: 2024-05-09
Payer: MEDICARE

## 2024-05-09 DIAGNOSIS — G70.00 MYASTHENIA GRAVIS W/OUT (ACUTE) EXACERBATION: ICD-10-CM

## 2024-05-09 DIAGNOSIS — R13.12 DYSPHAGIA, OROPHARYNGEAL PHASE: ICD-10-CM

## 2024-05-09 PROCEDURE — 99441: CPT | Mod: 93

## 2024-05-09 NOTE — REVIEW OF SYSTEMS
[Feeling Poorly] : not feeling poorly [Feeling Tired] : not feeling tired [Chest Pain] : no chest pain [Palpitations] : no palpitations [Shortness Of Breath] : no shortness of breath [SOB on Exertion] : no shortness of breath during exertion [Vomiting] : vomiting [Heartburn] : heartburn [Negative] : Heme/Lymph

## 2024-05-09 NOTE — HISTORY OF PRESENT ILLNESS
[FreeTextEntry1] : SAL HAJI is status post Upper endoscopy with robot-assisted hiatal hernia repair with a Toupet fundoplication and he is here for a follow up.   Surgery Date: 2/12/24 78 year old male PMH includes TIA, a-fib, anemia, esophageal dysmotility (and stricture vs Schatzki ring s/p esophageal dilation x 3 due to hiccups, bubble and vomiting - Minesh Solares [place]), GERD, Myasthenia Gravis, hiatal hernia.  Mr Haji presents to the office accompanied by his wife via telephone today. He reports having a few episodes of prolonged hiccups and occasional vomiting after eating solid foods like bread and chicken. He is experiencing occasional GERD like symptoms especially when lying flat. This has been going on for the past few weeks.

## 2024-05-09 NOTE — ASSESSMENT
[FreeTextEntry1] : We discussed diet modification to include softer foods with greater mastication and fluids. We discussed GERD prevention with less acidic foods and to refrain from lying flat after meals. He has known esophageal dysmotility. There is a possibility that this is partially creating his issue. He will be reporting to the office in 2 weeks for clinical evaluation. We discussed that if this continues to occur Endoscopy may be performed for better evaluation.   PLAN: - Return to care in 2 weeks  ** Today's visit was conducted via telephone as technical difficulties were encountered with Telehealth ** Total time spent on this encounter was 10 minutes     I, Dr. Evangelista personally performed the evaluation and management (E/M) services for this patient. That E/M includes conducting the initial examination, assessing all conditions, and establishing the plan of care. Today, Julius Matute NP was here to observe my evaluation and management services for this patient.

## 2024-05-10 ENCOUNTER — OUTPATIENT (OUTPATIENT)
Dept: OUTPATIENT SERVICES | Facility: HOSPITAL | Age: 79
LOS: 1 days | End: 2024-05-10
Payer: MEDICARE

## 2024-05-10 DIAGNOSIS — H26.8 OTHER SPECIFIED CATARACT: Chronic | ICD-10-CM

## 2024-05-10 DIAGNOSIS — Z95.818 PRESENCE OF OTHER CARDIAC IMPLANTS AND GRAFTS: Chronic | ICD-10-CM

## 2024-05-10 DIAGNOSIS — Z98.890 OTHER SPECIFIED POSTPROCEDURAL STATES: Chronic | ICD-10-CM

## 2024-05-10 DIAGNOSIS — Z95.828 PRESENCE OF OTHER VASCULAR IMPLANTS AND GRAFTS: Chronic | ICD-10-CM

## 2024-05-10 DIAGNOSIS — G70.01 MYASTHENIA GRAVIS WITH (ACUTE) EXACERBATION: ICD-10-CM

## 2024-05-10 PROCEDURE — 96523 IRRIG DRUG DELIVERY DEVICE: CPT

## 2024-05-13 ENCOUNTER — OUTPATIENT (OUTPATIENT)
Dept: OUTPATIENT SERVICES | Facility: HOSPITAL | Age: 79
LOS: 1 days | End: 2024-05-13
Payer: COMMERCIAL

## 2024-05-13 DIAGNOSIS — Z95.818 PRESENCE OF OTHER CARDIAC IMPLANTS AND GRAFTS: Chronic | ICD-10-CM

## 2024-05-13 DIAGNOSIS — G70.01 MYASTHENIA GRAVIS WITH (ACUTE) EXACERBATION: ICD-10-CM

## 2024-05-13 DIAGNOSIS — Z98.890 OTHER SPECIFIED POSTPROCEDURAL STATES: Chronic | ICD-10-CM

## 2024-05-13 DIAGNOSIS — H26.8 OTHER SPECIFIED CATARACT: Chronic | ICD-10-CM

## 2024-05-13 DIAGNOSIS — Z95.828 PRESENCE OF OTHER VASCULAR IMPLANTS AND GRAFTS: Chronic | ICD-10-CM

## 2024-05-13 LAB
HCT VFR BLD CALC: 37.2 % — LOW (ref 39–50)
HGB BLD-MCNC: 12.4 G/DL — LOW (ref 13–17)
MCHC RBC-ENTMCNC: 31.7 PG — SIGNIFICANT CHANGE UP (ref 27–34)
MCHC RBC-ENTMCNC: 33.3 GM/DL — SIGNIFICANT CHANGE UP (ref 32–36)
MCV RBC AUTO: 95.1 FL — SIGNIFICANT CHANGE UP (ref 80–100)
NRBC # BLD: 0 /100 WBCS — SIGNIFICANT CHANGE UP (ref 0–0)
PLATELET # BLD AUTO: 255 K/UL — SIGNIFICANT CHANGE UP (ref 150–400)
RBC # BLD: 3.91 M/UL — LOW (ref 4.2–5.8)
RBC # FLD: 15.8 % — HIGH (ref 10.3–14.5)
WBC # BLD: 6.34 K/UL — SIGNIFICANT CHANGE UP (ref 3.8–10.5)
WBC # FLD AUTO: 6.34 K/UL — SIGNIFICANT CHANGE UP (ref 3.8–10.5)

## 2024-05-13 PROCEDURE — 36514 APHERESIS PLASMA: CPT

## 2024-05-13 PROCEDURE — 85027 COMPLETE CBC AUTOMATED: CPT

## 2024-05-15 ENCOUNTER — OUTPATIENT (OUTPATIENT)
Dept: OUTPATIENT SERVICES | Facility: HOSPITAL | Age: 79
LOS: 1 days | End: 2024-05-15
Payer: COMMERCIAL

## 2024-05-15 DIAGNOSIS — Z98.890 OTHER SPECIFIED POSTPROCEDURAL STATES: Chronic | ICD-10-CM

## 2024-05-15 DIAGNOSIS — H26.8 OTHER SPECIFIED CATARACT: Chronic | ICD-10-CM

## 2024-05-15 DIAGNOSIS — G70.01 MYASTHENIA GRAVIS WITH (ACUTE) EXACERBATION: ICD-10-CM

## 2024-05-15 DIAGNOSIS — Z95.828 PRESENCE OF OTHER VASCULAR IMPLANTS AND GRAFTS: Chronic | ICD-10-CM

## 2024-05-15 DIAGNOSIS — Z95.818 PRESENCE OF OTHER CARDIAC IMPLANTS AND GRAFTS: Chronic | ICD-10-CM

## 2024-05-15 LAB
HCT VFR BLD CALC: 36 % — LOW (ref 39–50)
HGB BLD-MCNC: 12.1 G/DL — LOW (ref 13–17)
MCHC RBC-ENTMCNC: 32.1 PG — SIGNIFICANT CHANGE UP (ref 27–34)
MCHC RBC-ENTMCNC: 33.6 GM/DL — SIGNIFICANT CHANGE UP (ref 32–36)
MCV RBC AUTO: 95.5 FL — SIGNIFICANT CHANGE UP (ref 80–100)
NRBC # BLD: 0 /100 WBCS — SIGNIFICANT CHANGE UP (ref 0–0)
PLATELET # BLD AUTO: 213 K/UL — SIGNIFICANT CHANGE UP (ref 150–400)
RBC # BLD: 3.77 M/UL — LOW (ref 4.2–5.8)
RBC # FLD: 15.5 % — HIGH (ref 10.3–14.5)
WBC # BLD: 14.35 K/UL — HIGH (ref 3.8–10.5)
WBC # FLD AUTO: 14.35 K/UL — HIGH (ref 3.8–10.5)

## 2024-05-15 PROCEDURE — 36514 APHERESIS PLASMA: CPT

## 2024-05-15 PROCEDURE — P9041: CPT

## 2024-05-15 PROCEDURE — 85027 COMPLETE CBC AUTOMATED: CPT

## 2024-05-17 ENCOUNTER — OUTPATIENT (OUTPATIENT)
Dept: OUTPATIENT SERVICES | Facility: HOSPITAL | Age: 79
LOS: 1 days | End: 2024-05-17
Payer: COMMERCIAL

## 2024-05-17 DIAGNOSIS — Z95.818 PRESENCE OF OTHER CARDIAC IMPLANTS AND GRAFTS: Chronic | ICD-10-CM

## 2024-05-17 DIAGNOSIS — H26.8 OTHER SPECIFIED CATARACT: Chronic | ICD-10-CM

## 2024-05-17 DIAGNOSIS — Z98.890 OTHER SPECIFIED POSTPROCEDURAL STATES: Chronic | ICD-10-CM

## 2024-05-17 DIAGNOSIS — Z95.828 PRESENCE OF OTHER VASCULAR IMPLANTS AND GRAFTS: Chronic | ICD-10-CM

## 2024-05-17 DIAGNOSIS — G70.01 MYASTHENIA GRAVIS WITH (ACUTE) EXACERBATION: ICD-10-CM

## 2024-05-17 LAB
HCT VFR BLD CALC: 34.3 % — LOW (ref 39–50)
HGB BLD-MCNC: 11.4 G/DL — LOW (ref 13–17)
MCHC RBC-ENTMCNC: 32.3 PG — SIGNIFICANT CHANGE UP (ref 27–34)
MCHC RBC-ENTMCNC: 33.2 GM/DL — SIGNIFICANT CHANGE UP (ref 32–36)
MCV RBC AUTO: 97.2 FL — SIGNIFICANT CHANGE UP (ref 80–100)
NRBC # BLD: 0 /100 WBCS — SIGNIFICANT CHANGE UP (ref 0–0)
PLATELET # BLD AUTO: 198 K/UL — SIGNIFICANT CHANGE UP (ref 150–400)
RBC # BLD: 3.53 M/UL — LOW (ref 4.2–5.8)
RBC # FLD: 15.5 % — HIGH (ref 10.3–14.5)
WBC # BLD: 9.84 K/UL — SIGNIFICANT CHANGE UP (ref 3.8–10.5)
WBC # FLD AUTO: 9.84 K/UL — SIGNIFICANT CHANGE UP (ref 3.8–10.5)

## 2024-05-17 PROCEDURE — 36514 APHERESIS PLASMA: CPT

## 2024-05-20 ENCOUNTER — OUTPATIENT (OUTPATIENT)
Dept: OUTPATIENT SERVICES | Facility: HOSPITAL | Age: 79
LOS: 1 days | End: 2024-05-20
Payer: COMMERCIAL

## 2024-05-20 DIAGNOSIS — Z98.890 OTHER SPECIFIED POSTPROCEDURAL STATES: Chronic | ICD-10-CM

## 2024-05-20 DIAGNOSIS — H26.8 OTHER SPECIFIED CATARACT: Chronic | ICD-10-CM

## 2024-05-20 DIAGNOSIS — G70.01 MYASTHENIA GRAVIS WITH (ACUTE) EXACERBATION: ICD-10-CM

## 2024-05-20 DIAGNOSIS — Z95.828 PRESENCE OF OTHER VASCULAR IMPLANTS AND GRAFTS: Chronic | ICD-10-CM

## 2024-05-20 DIAGNOSIS — Z95.818 PRESENCE OF OTHER CARDIAC IMPLANTS AND GRAFTS: Chronic | ICD-10-CM

## 2024-05-20 LAB
HCT VFR BLD CALC: 34.5 % — LOW (ref 39–50)
HGB BLD-MCNC: 11.3 G/DL — LOW (ref 13–17)
MCHC RBC-ENTMCNC: 31.6 PG — SIGNIFICANT CHANGE UP (ref 27–34)
MCHC RBC-ENTMCNC: 32.8 GM/DL — SIGNIFICANT CHANGE UP (ref 32–36)
MCV RBC AUTO: 96.4 FL — SIGNIFICANT CHANGE UP (ref 80–100)
NRBC # BLD: 0 /100 WBCS — SIGNIFICANT CHANGE UP (ref 0–0)
PLATELET # BLD AUTO: 246 K/UL — SIGNIFICANT CHANGE UP (ref 150–400)
RBC # BLD: 3.58 M/UL — LOW (ref 4.2–5.8)
RBC # FLD: 14.6 % — HIGH (ref 10.3–14.5)
WBC # BLD: 9.73 K/UL — SIGNIFICANT CHANGE UP (ref 3.8–10.5)
WBC # FLD AUTO: 9.73 K/UL — SIGNIFICANT CHANGE UP (ref 3.8–10.5)

## 2024-05-20 PROCEDURE — 85027 COMPLETE CBC AUTOMATED: CPT

## 2024-05-20 PROCEDURE — 36514 APHERESIS PLASMA: CPT

## 2024-05-20 PROCEDURE — P9041: CPT

## 2024-05-24 ENCOUNTER — OUTPATIENT (OUTPATIENT)
Dept: OUTPATIENT SERVICES | Facility: HOSPITAL | Age: 79
LOS: 1 days | End: 2024-05-24
Payer: COMMERCIAL

## 2024-05-24 DIAGNOSIS — Z98.890 OTHER SPECIFIED POSTPROCEDURAL STATES: Chronic | ICD-10-CM

## 2024-05-24 DIAGNOSIS — Z95.818 PRESENCE OF OTHER CARDIAC IMPLANTS AND GRAFTS: Chronic | ICD-10-CM

## 2024-05-24 DIAGNOSIS — H26.8 OTHER SPECIFIED CATARACT: Chronic | ICD-10-CM

## 2024-05-24 DIAGNOSIS — G70.01 MYASTHENIA GRAVIS WITH (ACUTE) EXACERBATION: ICD-10-CM

## 2024-05-24 DIAGNOSIS — Z95.828 PRESENCE OF OTHER VASCULAR IMPLANTS AND GRAFTS: Chronic | ICD-10-CM

## 2024-05-24 LAB
HCT VFR BLD CALC: 33.9 % — LOW (ref 39–50)
HGB BLD-MCNC: 11.2 G/DL — LOW (ref 13–17)
MCHC RBC-ENTMCNC: 31.8 PG — SIGNIFICANT CHANGE UP (ref 27–34)
MCHC RBC-ENTMCNC: 33 GM/DL — SIGNIFICANT CHANGE UP (ref 32–36)
MCV RBC AUTO: 96.3 FL — SIGNIFICANT CHANGE UP (ref 80–100)
NRBC # BLD: 0 /100 WBCS — SIGNIFICANT CHANGE UP (ref 0–0)
PLATELET # BLD AUTO: 360 K/UL — SIGNIFICANT CHANGE UP (ref 150–400)
RBC # BLD: 3.52 M/UL — LOW (ref 4.2–5.8)
RBC # FLD: 14.6 % — HIGH (ref 10.3–14.5)
WBC # BLD: 7.27 K/UL — SIGNIFICANT CHANGE UP (ref 3.8–10.5)
WBC # FLD AUTO: 7.27 K/UL — SIGNIFICANT CHANGE UP (ref 3.8–10.5)

## 2024-05-24 PROCEDURE — 36514 APHERESIS PLASMA: CPT

## 2024-05-24 PROCEDURE — P9041: CPT

## 2024-05-24 PROCEDURE — 85027 COMPLETE CBC AUTOMATED: CPT

## 2024-05-29 ENCOUNTER — OUTPATIENT (OUTPATIENT)
Dept: OUTPATIENT SERVICES | Facility: HOSPITAL | Age: 79
LOS: 1 days | End: 2024-05-29
Payer: MEDICARE

## 2024-05-29 ENCOUNTER — TRANSCRIPTION ENCOUNTER (OUTPATIENT)
Age: 79
End: 2024-05-29

## 2024-05-29 VITALS
HEART RATE: 65 BPM | TEMPERATURE: 97 F | HEIGHT: 66 IN | WEIGHT: 126.99 LBS | SYSTOLIC BLOOD PRESSURE: 120 MMHG | OXYGEN SATURATION: 100 % | RESPIRATION RATE: 16 BRPM | DIASTOLIC BLOOD PRESSURE: 66 MMHG

## 2024-05-29 DIAGNOSIS — Z98.890 OTHER SPECIFIED POSTPROCEDURAL STATES: Chronic | ICD-10-CM

## 2024-05-29 DIAGNOSIS — Z95.818 PRESENCE OF OTHER CARDIAC IMPLANTS AND GRAFTS: Chronic | ICD-10-CM

## 2024-05-29 DIAGNOSIS — H26.8 OTHER SPECIFIED CATARACT: Chronic | ICD-10-CM

## 2024-05-29 DIAGNOSIS — Z95.828 PRESENCE OF OTHER VASCULAR IMPLANTS AND GRAFTS: Chronic | ICD-10-CM

## 2024-05-29 DIAGNOSIS — G70.00 MYASTHENIA GRAVIS WITHOUT (ACUTE) EXACERBATION: ICD-10-CM

## 2024-05-29 PROCEDURE — 36589 REMOVAL TUNNELED CV CATH: CPT

## 2024-05-29 RX ORDER — MIRTAZAPINE 45 MG/1
2 TABLET, ORALLY DISINTEGRATING ORAL
Refills: 0 | DISCHARGE

## 2024-05-29 RX ORDER — MYCOPHENOLATE MOFETIL 250 MG/1
2 CAPSULE ORAL
Refills: 0 | DISCHARGE

## 2024-05-29 RX ORDER — EFINACONAZOLE 100 MG/ML
1 SOLUTION TOPICAL
Refills: 0 | DISCHARGE

## 2024-05-29 RX ORDER — LIPASE/PROTEASE/AMYLASE 16-48-48K
0 CAPSULE,DELAYED RELEASE (ENTERIC COATED) ORAL
Refills: 0 | DISCHARGE

## 2024-05-29 RX ORDER — FERROUS SULFATE 325(65) MG
1 TABLET ORAL
Refills: 0 | DISCHARGE

## 2024-05-29 RX ORDER — MULTIVIT-MIN/FERROUS GLUCONATE 9 MG/15 ML
1 LIQUID (ML) ORAL
Qty: 0 | Refills: 0 | DISCHARGE

## 2024-05-29 RX ORDER — MULTIVIT WITH MIN/MFOLATE/K2 340-15/3 G
300 POWDER (GRAM) ORAL
Refills: 0 | DISCHARGE

## 2024-05-29 RX ORDER — DENOSUMAB 60 MG/ML
60 INJECTION SUBCUTANEOUS
Refills: 0 | DISCHARGE

## 2024-05-29 NOTE — ASU DISCHARGE PLAN (ADULT/PEDIATRIC) - ***IN THE EVENT THAT YOU DEVELOP A COMPLICATION AND YOU ARE UNABLE TO REACH YOUR OWN PHYSICIAN, YOU MAY CONTACT:
Estimated PASP on her last echo was in the 40s  We will place a referral to pulmonary hypertension  
KEE and dsDNA +   Sees rheumatology next week  Upper lobe blebs with lower lobe bronchiectasis   Obtaining sputum samples and will defer starting treatment until she has been evaluated by Rheumatology.   Continue albuterol and guaifenesin as needed  PFTs were mildly decreased over the last year we will repeat in 6 months to establish a trend  
Statement Selected

## 2024-05-29 NOTE — ASU DISCHARGE PLAN (ADULT/PEDIATRIC) - NURSING INSTRUCTIONS
Please feel free to contact us at [phone] if any problems arise. After 6PM, Monday through Friday, on weekends and on holidays, please call [phone] and ask for the radiology resident on call to be paged.

## 2024-05-29 NOTE — ASU PREOP CHECKLIST - HEART RATE (BEATS/MIN)
----- Message from Tod Ernst MD sent at 1/11/2023 12:04 PM EST -----  INR good. Continue same dosage and recheck INR in 1 month.
Patient notified and verbalized understanding
66

## 2024-05-29 NOTE — PROCEDURE NOTE - PROCEDURE FINDINGS AND DETAILS
RIJ tunneled HD catheter removal performed. Dry sterile dressing placed. Patient tolerated procedure well. Full report to follow

## 2024-05-29 NOTE — H&P ADULT - HISTORY OF PRESENT ILLNESS
Interventional Radiology    HPI: 79y Male with a tunneled CVC presents to IR today for Tunneled CVC removal    Allergies: penicillins (Rash)  immune globulin intravenous (Unknown)    Medications (Abx/Cardiac/Anticoagulation/Blood Products)      Data:    T(C): --  HR: --  BP: --  RR: --  SpO2: --    Exam  General: No acute distress  Chest: Non labored breathing  Abdomen: Non-distended  Extremities: No swelling, warm    -WBC 7.27 / HgB 11.2 / Hct 33.9 / Plt 360    Plan: 79y Male presents for Tunneled CVC removal  -Risks/Benefits/alternatives explained with the patient and/or healthcare proxy and witnessed informed consent obtained.

## 2024-05-29 NOTE — ASU DISCHARGE PLAN (ADULT/PEDIATRIC) - ASU DC SPECIAL INSTRUCTIONSFT
Tunneled catheter Removal    Discharge Instructions  - You have had a catheter removed from your chest.   - You may shower in 48 hours. No soaking or swimming for 2 weeks or until the site is completely healed.  - Keep the area covered and dry for the next 7 days.  - Do not perform any heavy lifting or put tension on the area for the next week or until the site is healed.  - You may resume your normal diet.  - You may resume your normal medications however you should wait 48 hours before restarting aspirin, Plavix, or blood thinners.  - It is normal to experience some pain over the site for the next few days. You may take apply ice to the area (20 minutes on, 20 minutes off) and take Tylenol for that pain. Do not take more frequently than every 6 hours and do not exceed more than 3000mg of Tylenol in a 24 hour period.    - You were given conscious sedation which may make you drowsy, therefore you need someone to stay with you until the morning following the procedure.  - Do not drive, engage in heavy lifting or strenuous activity, or drink any alcoholic beverages for the next 24 hours.   - You may resume normal activity in 24 hours.    Notify your primary physician and/or Interventional Radiology IMMEDIATELY if you experience any of the following       - Fever of 100.4 or 38C       - Chills or Rigors/ Shakes       - Swelling and/or Redness in the area around the port removal site       - Worsening Pain       - Blood soaked bandages or worsening bleeding       - Lightheadedness and/or dizziness upon standing       - Chest Pain/ Tightness       - Shortness of Breath       - Difficulty walking    If you have a problem that you believe requires IMMEDIATE attention, please go to your NEAREST Emergency Room. If you believe your problem can safely wait until you speak to a physician, please call Interventional Radiology for any concerns.    During Normal Weekday Business Hours- You can contact the Interventional Radiology department during normal business hours via telephone.  During Evenings and Weekends- If you need to contact Interventional Radiology during off hours, do so by calling the hospital and requesting to be connected to the Interventional Radiologist on call. Tunneled HD catheter Removal    Discharge Instructions  - You have had a catheter removed from your chest.   - You may shower in 48 hours. No soaking or swimming for 2 weeks or until the site is completely healed.  - Keep the area covered and dry for the next 7 days.  - Do not perform any heavy lifting or put tension on the area for the next week or until the site is healed.  - You may resume your normal diet.  - You may resume your normal medications however you should wait 48 hours before restarting aspirin, Plavix, or blood thinners.  - It is normal to experience some pain over the site for the next few days. You may take apply ice to the area (20 minutes on, 20 minutes off) and take Tylenol for that pain. Do not take more frequently than every 6 hours and do not exceed more than 3000mg of Tylenol in a 24 hour period.    - You were given conscious sedation which may make you drowsy, therefore you need someone to stay with you until the morning following the procedure.  - Do not drive, engage in heavy lifting or strenuous activity, or drink any alcoholic beverages for the next 24 hours.   - You may resume normal activity in 24 hours.    Notify your primary physician and/or Interventional Radiology IMMEDIATELY if you experience any of the following       - Fever of 100.4 or 38C       - Chills or Rigors/ Shakes       - Swelling and/or Redness in the area around the port removal site       - Worsening Pain       - Blood soaked bandages or worsening bleeding       - Lightheadedness and/or dizziness upon standing       - Chest Pain/ Tightness       - Shortness of Breath       - Difficulty walking    If you have a problem that you believe requires IMMEDIATE attention, please go to your NEAREST Emergency Room. If you believe your problem can safely wait until you speak to a physician, please call Interventional Radiology for any concerns.    During Normal Weekday Business Hours- You can contact the Interventional Radiology department during normal business hours via telephone.  During Evenings and Weekends- If you need to contact Interventional Radiology during off hours, do so by calling the hospital and requesting to be connected to the Interventional Radiologist on call.

## 2024-05-31 DIAGNOSIS — G70.00 MYASTHENIA GRAVIS WITHOUT (ACUTE) EXACERBATION: ICD-10-CM

## 2024-05-31 DIAGNOSIS — Z49.01 ENCOUNTER FOR FITTING AND ADJUSTMENT OF EXTRACORPOREAL DIALYSIS CATHETER: ICD-10-CM

## 2024-06-11 ENCOUNTER — APPOINTMENT (OUTPATIENT)
Dept: PHARMACY | Facility: CLINIC | Age: 79
End: 2024-06-11
Payer: SELF-PAY

## 2024-06-11 PROCEDURE — V5299A: CUSTOM

## 2024-06-26 PROBLEM — K22.2 ESOPHAGEAL STRICTURE: Status: ACTIVE | Noted: 2023-10-23

## 2024-06-27 ENCOUNTER — APPOINTMENT (OUTPATIENT)
Dept: THORACIC SURGERY | Facility: CLINIC | Age: 79
End: 2024-06-27
Payer: MEDICARE

## 2024-06-27 VITALS
RESPIRATION RATE: 16 BRPM | HEART RATE: 55 BPM | OXYGEN SATURATION: 100 % | HEIGHT: 66 IN | BODY MASS INDEX: 20.41 KG/M2 | DIASTOLIC BLOOD PRESSURE: 69 MMHG | WEIGHT: 127 LBS | TEMPERATURE: 97.7 F | SYSTOLIC BLOOD PRESSURE: 122 MMHG

## 2024-06-27 DIAGNOSIS — K22.2 ESOPHAGEAL OBSTRUCTION: ICD-10-CM

## 2024-06-27 PROCEDURE — 99213 OFFICE O/P EST LOW 20 MIN: CPT

## 2024-07-19 ENCOUNTER — OFFICE (OUTPATIENT)
Dept: URBAN - METROPOLITAN AREA CLINIC 109 | Facility: CLINIC | Age: 79
Setting detail: OPHTHALMOLOGY
End: 2024-07-19
Payer: MEDICARE

## 2024-07-19 DIAGNOSIS — H40.043: ICD-10-CM

## 2024-07-19 DIAGNOSIS — H26.492: ICD-10-CM

## 2024-07-19 DIAGNOSIS — B00.52: ICD-10-CM

## 2024-07-19 PROCEDURE — 92133 CPTRZD OPH DX IMG PST SGM ON: CPT | Performed by: OPHTHALMOLOGY

## 2024-07-19 PROCEDURE — 92014 COMPRE OPH EXAM EST PT 1/>: CPT | Performed by: OPHTHALMOLOGY

## 2024-07-19 ASSESSMENT — CONFRONTATIONAL VISUAL FIELD TEST (CVF)
OS_FINDINGS: FULL
OD_FINDINGS: FULL

## 2024-08-01 ENCOUNTER — OUTPATIENT (OUTPATIENT)
Dept: OUTPATIENT SERVICES | Facility: HOSPITAL | Age: 79
LOS: 1 days | End: 2024-08-01
Payer: MEDICARE

## 2024-08-01 DIAGNOSIS — Z98.890 OTHER SPECIFIED POSTPROCEDURAL STATES: Chronic | ICD-10-CM

## 2024-08-01 DIAGNOSIS — H26.8 OTHER SPECIFIED CATARACT: Chronic | ICD-10-CM

## 2024-08-01 DIAGNOSIS — Z95.828 PRESENCE OF OTHER VASCULAR IMPLANTS AND GRAFTS: Chronic | ICD-10-CM

## 2024-08-01 DIAGNOSIS — R11 NAUSEA AND VOMITING: ICD-10-CM

## 2024-08-01 DIAGNOSIS — Z95.818 PRESENCE OF OTHER CARDIAC IMPLANTS AND GRAFTS: Chronic | ICD-10-CM

## 2024-08-01 PROCEDURE — 78264 GASTRIC EMPTYING IMG STUDY: CPT | Mod: MH

## 2024-08-01 PROCEDURE — 78264 GASTRIC EMPTYING IMG STUDY: CPT | Mod: 26,MH

## 2024-08-01 PROCEDURE — A9548: CPT

## 2024-08-01 PROCEDURE — 82962 GLUCOSE BLOOD TEST: CPT

## 2024-08-01 PROCEDURE — A9541: CPT

## 2024-09-24 ENCOUNTER — APPOINTMENT (OUTPATIENT)
Dept: PHARMACY | Facility: CLINIC | Age: 79
End: 2024-09-24
Payer: SELF-PAY

## 2024-09-24 PROCEDURE — V5299A: CUSTOM

## 2024-11-05 ENCOUNTER — APPOINTMENT (OUTPATIENT)
Dept: PHARMACY | Facility: CLINIC | Age: 79
End: 2024-11-05

## 2024-11-05 ENCOUNTER — APPOINTMENT (OUTPATIENT)
Dept: PHARMACY | Facility: CLINIC | Age: 79
End: 2024-11-05
Payer: SELF-PAY

## 2024-11-05 ENCOUNTER — APPOINTMENT (OUTPATIENT)
Dept: OTOLARYNGOLOGY | Facility: CLINIC | Age: 79
End: 2024-11-05
Payer: MEDICARE

## 2024-11-05 VITALS
DIASTOLIC BLOOD PRESSURE: 75 MMHG | HEIGHT: 66 IN | BODY MASS INDEX: 21.53 KG/M2 | WEIGHT: 134 LBS | SYSTOLIC BLOOD PRESSURE: 146 MMHG | HEART RATE: 60 BPM

## 2024-11-05 DIAGNOSIS — G70.00 MYASTHENIA GRAVIS W/OUT (ACUTE) EXACERBATION: ICD-10-CM

## 2024-11-05 DIAGNOSIS — J34.89 OTHER SPECIFIED DISORDERS OF NOSE AND NASAL SINUSES: ICD-10-CM

## 2024-11-05 DIAGNOSIS — H90.5 UNSPECIFIED SENSORINEURAL HEARING LOSS: ICD-10-CM

## 2024-11-05 DIAGNOSIS — G47.33 OBSTRUCTIVE SLEEP APNEA (ADULT) (PEDIATRIC): ICD-10-CM

## 2024-11-05 DIAGNOSIS — J34.829 NASAL VALVE COLLAPSE, UNSPECIFIED: ICD-10-CM

## 2024-11-05 PROCEDURE — 99214 OFFICE O/P EST MOD 30 MIN: CPT

## 2024-11-05 PROCEDURE — 92567 TYMPANOMETRY: CPT

## 2024-11-05 PROCEDURE — V5299A: CUSTOM

## 2024-11-05 PROCEDURE — 92557 COMPREHENSIVE HEARING TEST: CPT

## 2024-11-22 ENCOUNTER — OFFICE (OUTPATIENT)
Dept: URBAN - METROPOLITAN AREA CLINIC 109 | Facility: CLINIC | Age: 79
Setting detail: OPHTHALMOLOGY
End: 2024-11-22
Payer: MEDICARE

## 2024-11-22 DIAGNOSIS — H26.492: ICD-10-CM

## 2024-11-22 DIAGNOSIS — H40.043: ICD-10-CM

## 2024-11-22 DIAGNOSIS — B00.52: ICD-10-CM

## 2024-11-22 PROCEDURE — 92133 CPTRZD OPH DX IMG PST SGM ON: CPT | Performed by: OPHTHALMOLOGY

## 2024-11-22 PROCEDURE — 99213 OFFICE O/P EST LOW 20 MIN: CPT | Performed by: OPHTHALMOLOGY

## 2024-11-22 ASSESSMENT — REFRACTION_CURRENTRX
OD_ADD: +2.50
OD_SPHERE: +0.50
OD_VPRISM_DIRECTION: BF
OS_CYLINDER: -0.75
OS_AXIS: 084
OS_SPHERE: +1.00
OD_OVR_VA: 20/
OS_ADD: +2.50
OD_AXIS: 094
OS_OVR_VA: 20/
OD_CYLINDER: -0.75
OS_VPRISM_DIRECTION: BF

## 2024-11-22 ASSESSMENT — KERATOMETRY
OS_K1POWER_DIOPTERS: 42.25
OD_K1POWER_DIOPTERS: 42.50
OS_AXISANGLE_DEGREES: 171
OD_AXISANGLE_DEGREES: 11
OD_K2POWER_DIOPTERS: 44.00
OS_K2POWER_DIOPTERS: 43.00

## 2024-11-22 ASSESSMENT — REFRACTION_MANIFEST
OS_CYLINDER: -0.75
OS_AXIS: 085
OD_VA1: 20/20
OS_ADD: +2.50
OD_CYLINDER: -0.75
OD_SPHERE: +0.50
OD_ADD: +2.50
OD_AXIS: 090
OS_VA1: 20/20
OS_SPHERE: +1.00

## 2024-11-22 ASSESSMENT — VISUAL ACUITY
OD_BCVA: 20/20-1
OS_BCVA: 20/20

## 2024-11-22 ASSESSMENT — PACHYMETRY
OD_CT_CORRECTION: -1
OS_CT_UM: 567
OS_CT_CORRECTION: -1
OD_CT_UM: 552

## 2024-11-22 ASSESSMENT — CONFRONTATIONAL VISUAL FIELD TEST (CVF)
OD_FINDINGS: FULL
OS_FINDINGS: FULL

## 2024-11-22 ASSESSMENT — REFRACTION_AUTOREFRACTION
OS_SPHERE: +1.75
OD_AXIS: 092
OD_CYLINDER: -2.00
OD_SPHERE: +1.00
OS_AXIS: 076
OS_CYLINDER: -1.50

## 2024-11-22 ASSESSMENT — TONOMETRY
OD_IOP_MMHG: 18
OS_IOP_MMHG: 18

## 2024-12-11 ENCOUNTER — APPOINTMENT (OUTPATIENT)
Dept: PHARMACY | Facility: CLINIC | Age: 79
End: 2024-12-11
Payer: SELF-PAY

## 2024-12-11 PROCEDURE — V5299A: CUSTOM

## 2025-01-14 ENCOUNTER — APPOINTMENT (OUTPATIENT)
Dept: PHARMACY | Facility: CLINIC | Age: 80
End: 2025-01-14
Payer: SELF-PAY

## 2025-01-14 PROCEDURE — V5299A: CUSTOM

## 2025-02-06 ENCOUNTER — APPOINTMENT (OUTPATIENT)
Dept: THORACIC SURGERY | Facility: CLINIC | Age: 80
End: 2025-02-06
Payer: MEDICARE

## 2025-02-06 VITALS
HEIGHT: 66 IN | DIASTOLIC BLOOD PRESSURE: 79 MMHG | RESPIRATION RATE: 16 BRPM | HEART RATE: 77 BPM | OXYGEN SATURATION: 99 % | SYSTOLIC BLOOD PRESSURE: 131 MMHG

## 2025-02-06 DIAGNOSIS — K21.00 DIAPHRAGMATIC HERNIA W/OUT OBSTRUCTION OR GANGRENE: ICD-10-CM

## 2025-02-06 DIAGNOSIS — K44.9 DIAPHRAGMATIC HERNIA W/OUT OBSTRUCTION OR GANGRENE: ICD-10-CM

## 2025-02-06 DIAGNOSIS — K22.2 ESOPHAGEAL OBSTRUCTION: ICD-10-CM

## 2025-02-06 DIAGNOSIS — K21.9 GASTRO-ESOPHAGEAL REFLUX DISEASE W/OUT ESOPHAGITIS: ICD-10-CM

## 2025-02-06 PROCEDURE — 99213 OFFICE O/P EST LOW 20 MIN: CPT

## 2025-02-06 RX ORDER — EFINACONAZOLE 100 MG/ML
10 SOLUTION TOPICAL
Refills: 0 | Status: ACTIVE | COMMUNITY

## 2025-02-06 RX ORDER — TAMSULOSIN HYDROCHLORIDE 0.4 MG/1
0.4 CAPSULE ORAL
Refills: 0 | Status: ACTIVE | COMMUNITY

## 2025-02-06 RX ORDER — CHOLESTYRAMINE 4 G/9G
4 POWDER, FOR SUSPENSION ORAL
Refills: 0 | Status: ACTIVE | COMMUNITY

## 2025-03-18 NOTE — ASU PATIENT PROFILE, ADULT - FALL HARM RISK - UNIVERSAL INTERVENTIONS
Bed in lowest position, wheels locked, appropriate side rails in place/Call bell, personal items and telephone in reach/Instruct patient to call for assistance before getting out of bed or chair/Non-slip footwear when patient is out of bed/[place] to call system/Physically safe environment - no spills, clutter or unnecessary equipment/Purposeful Proactive Rounding/Room/bathroom lighting operational, light cord in reach

## 2025-03-19 ENCOUNTER — TRANSCRIPTION ENCOUNTER (OUTPATIENT)
Age: 80
End: 2025-03-19

## 2025-03-19 ENCOUNTER — APPOINTMENT (OUTPATIENT)
Dept: THORACIC SURGERY | Facility: HOSPITAL | Age: 80
End: 2025-03-19

## 2025-03-19 ENCOUNTER — OUTPATIENT (OUTPATIENT)
Dept: OUTPATIENT SERVICES | Facility: HOSPITAL | Age: 80
LOS: 1 days | End: 2025-03-19
Payer: MEDICARE

## 2025-03-19 ENCOUNTER — RESULT REVIEW (OUTPATIENT)
Age: 80
End: 2025-03-19

## 2025-03-19 VITALS
SYSTOLIC BLOOD PRESSURE: 146 MMHG | OXYGEN SATURATION: 100 % | RESPIRATION RATE: 15 BRPM | DIASTOLIC BLOOD PRESSURE: 80 MMHG | HEART RATE: 65 BPM

## 2025-03-19 VITALS
WEIGHT: 136.91 LBS | OXYGEN SATURATION: 100 % | SYSTOLIC BLOOD PRESSURE: 150 MMHG | RESPIRATION RATE: 19 BRPM | HEIGHT: 66 IN | DIASTOLIC BLOOD PRESSURE: 71 MMHG | TEMPERATURE: 98 F | HEART RATE: 74 BPM

## 2025-03-19 DIAGNOSIS — Z98.890 OTHER SPECIFIED POSTPROCEDURAL STATES: Chronic | ICD-10-CM

## 2025-03-19 DIAGNOSIS — Z95.818 PRESENCE OF OTHER CARDIAC IMPLANTS AND GRAFTS: Chronic | ICD-10-CM

## 2025-03-19 DIAGNOSIS — Z95.828 PRESENCE OF OTHER VASCULAR IMPLANTS AND GRAFTS: Chronic | ICD-10-CM

## 2025-03-19 DIAGNOSIS — K44.9 DIAPHRAGMATIC HERNIA WITHOUT OBSTRUCTION OR GANGRENE: ICD-10-CM

## 2025-03-19 DIAGNOSIS — H26.8 OTHER SPECIFIED CATARACT: Chronic | ICD-10-CM

## 2025-03-19 PROCEDURE — 88305 TISSUE EXAM BY PATHOLOGIST: CPT

## 2025-03-19 PROCEDURE — 88312 SPECIAL STAINS GROUP 1: CPT | Mod: 26

## 2025-03-19 PROCEDURE — 43239 EGD BIOPSY SINGLE/MULTIPLE: CPT

## 2025-03-19 PROCEDURE — 88312 SPECIAL STAINS GROUP 1: CPT

## 2025-03-19 PROCEDURE — 88305 TISSUE EXAM BY PATHOLOGIST: CPT | Mod: 26

## 2025-03-19 PROCEDURE — 43200 ESOPHAGOSCOPY FLEXIBLE BRUSH: CPT

## 2025-03-19 PROCEDURE — 88342 IMHCHEM/IMCYTCHM 1ST ANTB: CPT

## 2025-03-19 PROCEDURE — 88342 IMHCHEM/IMCYTCHM 1ST ANTB: CPT | Mod: 26

## 2025-03-19 DEVICE — KIT A-LINE 1LUM 20G X 12CM SAFE KIT: Type: IMPLANTABLE DEVICE | Status: FUNCTIONAL

## 2025-03-19 NOTE — PRE-OP CHECKLIST - LATEX ALLERGY
General


Chief Complaint:  Cardiac/General Problems


Stated Complaint:  RECENT FALL, HEADACHE, PER CNA HAS HIGH BP


Nursing Triage Note:  


pt presents to ed with complaints of ha starting at 0700 this am when she woke 


up. pt had n/v starting today. pt reports she fell Wednesday and hit her head. 


Pt reports she was seen in ED and has staples placed for the lac on the back of 


her head.


Nursing Sepsis Screen:  No Definite Risk


Source of Information:  Patient


Exam Limitations:  No Limitations





History of Present Illness


Date Seen by Provider:  2021


Time Seen by Provider:  12:42


Initial Comments


Here with report of fall few days ago and hit her head.  States that she fell on

her couch.  She was seen here that day and did have CT of her head.  This 

morning she woke up with severe headache and high blood pressure.  Does not 

normally have high blood pressure or at least is not treated for it.  Had nausea

and vomiting on the way here to the emergency department to be seen.  Overall 

doing better now but does still complain of headache.  Blood pressure noted to 

be 200 systolic which is not normal for her per her care attendant and family.  

They state her blood pressures normally in the 130s systolic.  Also complains of

pain to low buttocks area after the fall.  That has not been x-rayed.


Timing/Duration:  4-5 Days


Severity:  Moderate


Associated Systoms:  No Chest Pain, No Cough, No Fever/Chills; Headaches, 

Nausea/Vomiting; No Shortness of Air, No Weakness





Allergies and Home Medications


Allergies


Coded Allergies:  


     quetiapine (Verified  Adverse Reaction, Unknown, 19)


   confusion





Home Medications


Amlodipine Besylate 5 Mg Tablet, 5 MG PO DAILY


   Prescribed by: NARENDRA ROSAS on 21 1506





Patient Home Medication List


Home Medication List Reviewed:  Yes





Review of Systems


Review of Systems


Constitutional:  see HPI; No chills, No fever


Respiratory:  no symptoms reported


Cardiovascular:  No chest pain, No edema


Gastrointestinal:  No abdominal pain; nausea, vomiting


Genitourinary:  no symptoms reported


Musculoskeletal:  see HPI, back pain; No muscle weakness, No neck pain


Skin:  no symptoms reported


Psychiatric/Neurological:  Headache, Other (Does see a ghost but this is not new

and appears unchanged)





All Other Systems Reviewed


Negative Unless Noted:  Yes





Past Medical-Social-Family Hx


Past Med/Social Hx:  Reviewed Nursing Past Med/Soc Hx


Patient Social History


Alcohol Use:  Denies Use


Smoking Status:  Never a Smoker


Recent Infectious Disease Expo:  No


Recent Hopitalizations:  No





Immunizations Up To Date


Tetanus Booster (TDap):  Unknown


PED Vaccines UTD:  No


Date of Pneumonia Vaccine:  Oct 1, 2017


Date of Influenza Vaccine:  Oct 1, 2017





Seasonal Allergies


Seasonal Allergies:  No





Past Medical History


Surgeries:  No


Respiratory:  No


Currently Using CPAP:  No


Currently Using BIPAP:  No


Cardiac:  Yes (SEES DR. BUSBY UNSURE WHY)


Hypertension


Neurological:  Yes


Dementia


Reproductive Disorders:  No


Female Reproductive Disorders:  Denies


Sexually Transmitted Disease:  No


HIV/AIDS:  No


Genitourinary:  Yes


UTI-Chronic


Gastrointestinal:  No


Musculoskeletal:  Yes


Fractures, Gout


Endocrine:  Yes


Hyperthyroidism


HEENT:  Yes


Cataract


Loss of Vision:  Denies


Hearing Impairment:  Hard of Hearing


Cancer:  No


Psychosocial:  No


Integumentary:  No


Recent Skin Changes


Blood Disorders:  No


Adverse Reaction/Blood Tranf:  No





Family Medical History


Reviewed Nursing Family Hx





Dementia


  G8 SISTER


Diabetes mellitus


  19 FATHER


No Pertinent Family Hx, Diabetes, Psychiatric Problems





Physical Exam


Vital Signs





Vital Signs - First Documented








 21





 12:45


 


Temp 36.6


 


Pulse 108


 


Resp 20


 


B/P (MAP) 149/77 (101)


 


Pulse Ox 99





Capillary Refill : Less Than 3 Seconds


Height, Weight, BMI


Height: 5'4.00"


Weight: 117lbs. 8.0oz. 53.025575be; 32.00 BMI


Method:Stated


General Appearance:  No Apparent Distress, WD/WN


HEENT:  PERRL/EOMI, TMs Normal, Pharynx Normal


Neck:  Full Range of Motion, Normal Inspection, Non Tender


Respiratory:  Lungs Clear, Normal Breath Sounds


Cardiovascular:  Regular Rate, Rhythm, No Murmur


Gastrointestinal:  Non Tender, Soft


Back:  Normal Inspection, No CVA Tenderness, No Vertebral Tenderness, Other 

(Tender sacrum coccyx area)


Extremity:  Normal Range of Motion, Non Tender


Neurologic/Psychiatric:  Alert, Normal Mood/Affect


Skin:  Normal Color, Warm/Dry





Progress/Results/Core Measures


Suspected Sepsis


Recent Fever Within 48 Hours:  No


Infection Criteria Present:  None


New/Unexplained  Altered Menta:  No


Sepsis Screen:  No Definite Risk


SIRS


Temperature: 


Pulse: 108 


Respiratory Rate: 20


 


Laboratory Tests


21 13:12: White Blood Count 8.4


Blood Pressure 149 /77 


Mean: 101


 


Laboratory Tests


21 13:12: 


Creatinine 1.00, Platelet Count 157, Total Bilirubin 0.6








Results/Orders


Lab Results





Laboratory Tests








Test


 21


13:12 Range/Units


 


 


White Blood Count


 8.4 


 4.3-11.0


10^3/uL


 


Red Blood Count


 4.37 


 3.80-5.11


10^6/uL


 


Hemoglobin 12.5  11.5-16.0  g/dL


 


Hematocrit 40  35-52  %


 


Mean Corpuscular Volume 92  80-99  fL


 


Mean Corpuscular Hemoglobin 29  25-34  pg


 


Mean Corpuscular Hemoglobin


Concent 31 L


 32-36  g/dL





 


Red Cell Distribution Width 13.8  10.0-14.5  %


 


Platelet Count


 157 


 130-400


10^3/uL


 


Mean Platelet Volume 8.7 L 9.0-12.2  fL


 


Immature Granulocyte % (Auto) 0   %


 


Neutrophils (%) (Auto) 74  42-75  %


 


Lymphocytes (%) (Auto) 19  12-44  %


 


Monocytes (%) (Auto) 6  0-12  %


 


Eosinophils (%) (Auto) 1  0-10  %


 


Basophils (%) (Auto) 0  0-10  %


 


Neutrophils # (Auto)


 6.2 


 1.8-7.8


10^3/uL


 


Lymphocytes # (Auto)


 1.6 


 1.0-4.0


10^3/uL


 


Monocytes # (Auto)


 0.5 


 0.0-1.0


10^3/uL


 


Eosinophils # (Auto)


 0.1 


 0.0-0.3


10^3/uL


 


Basophils # (Auto)


 0.0 


 0.0-0.1


10^3/uL


 


Immature Granulocyte # (Auto)


 0.0 


 0.0-0.1


10^3/uL


 


Sodium Level 136  135-145  MMOL/L


 


Potassium Level 4.2  3.6-5.0  MMOL/L


 


Chloride Level 101    MMOL/L


 


Carbon Dioxide Level 22  21-32  MMOL/L


 


Anion Gap 13  5-14  MMOL/L


 


Blood Urea Nitrogen 25 H 7-18  MG/DL


 


Creatinine


 1.00 


 0.60-1.30


MG/DL


 


Estimat Glomerular Filtration


Rate 53 


  





 


BUN/Creatinine Ratio 25   


 


Glucose Level 158 H   MG/DL


 


Calcium Level 9.0  8.5-10.1  MG/DL


 


Corrected Calcium 9.2  8.5-10.1  MG/DL


 


Total Bilirubin 0.6  0.1-1.0  MG/DL


 


Aspartate Amino Transf


(AST/SGOT) 24 


 5-34  U/L





 


Alanine Aminotransferase


(ALT/SGPT) 27 


 0-55  U/L





 


Alkaline Phosphatase 57    U/L


 


Troponin I < 0.028  <0.028  NG/ML


 


Total Protein 6.8  6.4-8.2  GM/DL


 


Albumin 3.7  3.2-4.5  GM/DL








My Orders





Orders - NARENDRA ROSAS MD


Cbc With Automated Diff (21 12:50)


Comprehensive Metabolic Panel (21 12:50)


Troponin I (21 12:50)


Ct Head Wo (21 12:50)


Ed Iv/Invasive Line Start (21 12:50)


Ekg Tracing (21 12:57)


Pelvis (21 13:35)


Sacrum And Coccyx (21 13:35)


Amlodipine Tablet (Norvasc Tablet) (21 15:00)





Medications Given in ED





Current Medications








 Medications  Dose


 Ordered  Sig/Familia


 Route  Start Time


 Stop Time Status Last Admin


Dose Admin


 


 Amlodipine


 Besylate  5 mg  ONCE  ONCE


 PO  21 15:00


 21 15:01 DC 21 14:57


5 MG








Vital Signs/I&O











 21





 12:45


 


Temp 36.6


 


Pulse 108


 


Resp 20


 


B/P (MAP) 149/77 (101)


 


Pulse Ox 99





Capillary Refill : Less Than 3 Seconds








Blood Pressure Mean:                    101








Progress Note :  


Progress Note


Seen and evaluated.  CT head, EKG and labs ordered.  Added pelvic and sacral 

coccyx x-rays.  Monitor patient.  1500: Labs reviewed no acute findings.  CT and

x-rays reviewed and no acute findings.  Amlodipine 5 mg p.o. has been given.  I 

did discuss the case with the patient's daughter.  We will continue amlodipine 5

daily and have her follow-up with her doctor.  I did write a 30-day prescription

and I will send a copy of the chart to Dr. Hare.  Discharged home with 

return precautions.  Patient's daughter and patient's caretaker verbalized 

understanding of instructions and agreement with plan.





ECG


Initial ECG Impression Date:  2021


Initial ECG Impression Time:  13:08


Initial ECG Rate:  73


Initial ECG Rhythm:  Normal Sinus


Comment


Sinus rhythm with artifact.  Left axis deviation.  No evidence of ST elevation 

MI.  Similar to 10/25/2020.  Interpreted by me.





Diagnostic Imaging





   Diagonstic Imaging:  CT


   Plain Films/CT/US/NM/MRI:  head


Comments


                 [facility] [employer] [facility] [place]





NAME:   DARRION GARCIA


[facility] REC#:   [id number]


ACCOUNT#:   [id number]


PT STATUS:   REG ER


:   1934


PHYSICIAN:   NARENDRA ROSAS MD


ADMIT DATE:   21/ER


                                  ***Signed***


Date of Exam:21





CT HEAD WO








PROCEDURE: CT head without contrast.





TECHNIQUE: Multiple contiguous axial images were obtained through


the brain without the use of intravenous contrast. Auto Exposure


Controls were utilized during the CT exam to meet ALARA standards


for radiation dose reduction. 





INDICATION:  Fall with headache and hypertension 





FINDINGS: There is prominence of the ventricles and sulci. There


is no hydrocephalus or cerebral edema. There is no midline shift


or mass-effect. There is no intracranial mass, hemorrhage, or


extra-axial fluid collection. There is some diffuse decreased


attenuation of the periventricular white matter which is


nonspecific. The visualized paranasal sinuses and mastoid air


cells are clear. There are no regional areas of decreased


attenuation appreciated to suggest an acute CVA.





IMPRESSION: 


1. No acute intracranial process.


2. Age-appropriate atrophy.


3. Decreased attenuation of the periventricular white matter


which is nonspecific, however, likely reflects senescent change


and/or chronic small vessel ischemic disease.





Dictated by: 





  Dictated on workstation # [id number]








Dict:   21 1333


Trans:   21 1355


[facility] [id number]





Interpreted by:     GEORGE GALE MD


Electronically signed by: GEORGE GALE MD 21 1359








   Diagonstic Imaging:  Xray


   Plain Films/CT/US/NM/MRI:  pelvis


Comments


                 [facility]


                                [place], [place]





NAME:   DARRION GARCIA


[facility] REC#:   [id number]


ACCOUNT#:   [id number]


PT STATUS:   REG ER


:   1934


PHYSICIAN:   NARENDRA ROSAS MD


ADMIT DATE:   21/ER


                                   ***Draft***


Date of Exam:21





PELVIS








INDICATION: Fall with pain.





FINDINGS: There are chronic-appearing fracture deformities of the


obturator rings bilaterally. Proximal femurs are intact. No acute


fracture or dislocation.





IMPRESSION: No acute fracture or dislocation.





Old fracture deformities of the obturator rings bilaterally.





  Dictated on workstation # [id number]








Dict:   21 1403


Trans:   21 [facility]0


[facility] [id number]





Interpreted by:     GEORGE GALE MD


Electronically signed by:








   Diagonstic Imaging:  Xray


   Plain Films/CT/US/NM/MRI:  other


Comments


                 [facility] [place], [place]





NAME:   DARRION GARCIA


[facility] REC#:   [id number]


ACCOUNT#:   [id number]


PT STATUS:   REG ER


:   1934


PHYSICIAN:   NARENDRA ROSAS MD


ADMIT DATE:   21/ER


                                   ***Draft***


Date of Exam:21





SACRUM AND COCCYX








EXAM: SACRUM AND COCCYX





INDICATION: Fall.





COMPARISON: CT abdomen pelvis without contrast 2019.





FINDINGS: Chronic fracture deformities of the superior and


inferior pubic rami appear similar to the prior CT allowing for


differences in technique. No acute fractures are identified. The


sacrum is obscured by overlying tissues in the AP radiograph. No


fractures identified on the lateral.





IMPRESSION: No acute radiographic findings in the sacrum or


coccyx.





  Dictated on workstation # [id number]








Dict:   21 1403


Trans:   21 1433


[facility] [id number]





Interpreted by:     KESHAV VILA MD


Electronically signed by:





Departure


Impression





   Primary Impression:  


   Headache due to injury of head and neck


   Additional Impressions:  


   Uncontrolled hypertension


   Sacral back pain


Disposition:  01 HOME, SELF-CARE


Condition:  Improved





Departure-Patient Inst.


Decision time for Depature:  15:03


Referrals:  


NICOLASA HARE DO (PCP/Family)


Primary Care Physician


Patient Instructions:  Headache, Adult, High Blood Pressure (DC), Coccyx Injury 

(DC)





Add. Discharge Instructions:  








All discharge instructions reviewed with patient and/or family. Voiced 

understanding.





Take medications as directed.  You may take Tylenol/acetaminophen 650 mg every 6

hours as needed for pain.  Drink plenty of fluids.  Follow-up with your doctor 

in a few days for recheck.  Return for worse pain, weakness, vomiting, chest 

pain, breathing problems or other concerns as needed.


Scripts


Amlodipine Besylate (Amlodipine Besylate) 5 Mg Tablet


5 MG PO DAILY for 30 Days, #30 TAB 0 Refills


   Prov: NARENDRA ROSAS MD         21





Copy


Copies To 1:   NICOLASA HARE TIMOTHY D MD          2021 13:11 no

## 2025-03-21 ENCOUNTER — OFFICE (OUTPATIENT)
Dept: URBAN - METROPOLITAN AREA CLINIC 109 | Facility: CLINIC | Age: 80
Setting detail: OPHTHALMOLOGY
End: 2025-03-21
Payer: MEDICARE

## 2025-03-21 DIAGNOSIS — B00.52: ICD-10-CM

## 2025-03-21 DIAGNOSIS — H26.492: ICD-10-CM

## 2025-03-21 DIAGNOSIS — H40.043: ICD-10-CM

## 2025-03-21 LAB — SURGICAL PATHOLOGY STUDY: SIGNIFICANT CHANGE UP

## 2025-03-21 PROCEDURE — 92083 EXTENDED VISUAL FIELD XM: CPT | Performed by: OPHTHALMOLOGY

## 2025-03-21 PROCEDURE — 92014 COMPRE OPH EXAM EST PT 1/>: CPT | Performed by: OPHTHALMOLOGY

## 2025-03-21 PROCEDURE — 92250 FUNDUS PHOTOGRAPHY W/I&R: CPT | Performed by: OPHTHALMOLOGY

## 2025-03-21 ASSESSMENT — REFRACTION_CURRENTRX
OD_AXIS: 094
OS_VPRISM_DIRECTION: BF
OD_ADD: +2.50
OS_AXIS: 084
OS_ADD: +2.50
OD_CYLINDER: -0.75
OS_SPHERE: +1.00
OD_OVR_VA: 20/
OD_VPRISM_DIRECTION: BF
OS_OVR_VA: 20/
OS_CYLINDER: -0.75
OD_SPHERE: +0.50

## 2025-03-21 ASSESSMENT — REFRACTION_MANIFEST
OS_SPHERE: +1.00
OD_VA1: 20/20
OD_SPHERE: +0.50
OD_AXIS: 090
OS_AXIS: 085
OS_ADD: +2.50
OS_VA1: 20/20
OS_CYLINDER: -0.75
OD_ADD: +2.50
OD_CYLINDER: -0.75

## 2025-03-21 ASSESSMENT — KERATOMETRY
OD_K2POWER_DIOPTERS: 44.00
OD_K1POWER_DIOPTERS: 42.50
OS_AXISANGLE_DEGREES: 171
OS_K2POWER_DIOPTERS: 43.00
OD_AXISANGLE_DEGREES: 11
OS_K1POWER_DIOPTERS: 42.25

## 2025-03-21 ASSESSMENT — REFRACTION_AUTOREFRACTION
OD_SPHERE: +1.00
OS_CYLINDER: -1.00
OS_AXIS: 078
OD_AXIS: 098
OD_CYLINDER: -1.75
OS_SPHERE: +2.00

## 2025-03-21 ASSESSMENT — PACHYMETRY
OD_CT_CORRECTION: -1
OS_CT_CORRECTION: -1
OD_CT_UM: 552
OS_CT_UM: 567

## 2025-03-21 ASSESSMENT — VISUAL ACUITY
OS_BCVA: 20/20-1
OD_BCVA: 20/20

## 2025-03-21 ASSESSMENT — TONOMETRY
OS_IOP_MMHG: 18
OD_IOP_MMHG: 18

## 2025-03-21 ASSESSMENT — CONFRONTATIONAL VISUAL FIELD TEST (CVF)
OD_FINDINGS: FULL
OS_FINDINGS: FULL

## 2025-03-25 ENCOUNTER — NON-APPOINTMENT (OUTPATIENT)
Age: 80
End: 2025-03-25

## 2025-04-03 ENCOUNTER — APPOINTMENT (OUTPATIENT)
Dept: THORACIC SURGERY | Facility: CLINIC | Age: 80
End: 2025-04-03
Payer: MEDICARE

## 2025-04-03 VITALS
BODY MASS INDEX: 21.79 KG/M2 | OXYGEN SATURATION: 99 % | TEMPERATURE: 97.6 F | DIASTOLIC BLOOD PRESSURE: 75 MMHG | HEART RATE: 65 BPM | RESPIRATION RATE: 16 BRPM | WEIGHT: 135 LBS | SYSTOLIC BLOOD PRESSURE: 125 MMHG

## 2025-04-03 DIAGNOSIS — K44.9 DIAPHRAGMATIC HERNIA W/OUT OBSTRUCTION OR GANGRENE: ICD-10-CM

## 2025-04-03 DIAGNOSIS — K21.00 DIAPHRAGMATIC HERNIA W/OUT OBSTRUCTION OR GANGRENE: ICD-10-CM

## 2025-04-03 DIAGNOSIS — K22.2 ESOPHAGEAL OBSTRUCTION: ICD-10-CM

## 2025-04-03 PROCEDURE — 99213 OFFICE O/P EST LOW 20 MIN: CPT

## 2025-04-03 RX ORDER — TESTOSTERONE 20.25 MG/1.25G
GEL, METERED TRANSDERMAL
Refills: 0 | Status: ACTIVE | COMMUNITY

## 2025-04-03 RX ORDER — ERGOCALCIFEROL (VITAMIN D2) 200 MCG/ML
DROPS ORAL
Refills: 0 | Status: ACTIVE | COMMUNITY

## 2025-05-06 ENCOUNTER — APPOINTMENT (OUTPATIENT)
Dept: PHARMACY | Facility: CLINIC | Age: 80
End: 2025-05-06
Payer: SELF-PAY

## 2025-05-06 PROCEDURE — V5299A: CUSTOM

## 2025-06-03 ENCOUNTER — APPOINTMENT (OUTPATIENT)
Dept: OTOLARYNGOLOGY | Facility: CLINIC | Age: 80
End: 2025-06-03
Payer: MEDICARE

## 2025-06-03 VITALS
WEIGHT: 138 LBS | HEIGHT: 66 IN | BODY MASS INDEX: 22.18 KG/M2 | SYSTOLIC BLOOD PRESSURE: 142 MMHG | DIASTOLIC BLOOD PRESSURE: 72 MMHG | HEART RATE: 60 BPM

## 2025-06-03 DIAGNOSIS — H60.392 OTHER INFECTIVE OTITIS EXTERNA, LEFT EAR: ICD-10-CM

## 2025-06-03 DIAGNOSIS — J34.89 OTHER SPECIFIED DISORDERS OF NOSE AND NASAL SINUSES: ICD-10-CM

## 2025-06-03 DIAGNOSIS — G47.33 OBSTRUCTIVE SLEEP APNEA (ADULT) (PEDIATRIC): ICD-10-CM

## 2025-06-03 DIAGNOSIS — J34.3 HYPERTROPHY OF NASAL TURBINATES: ICD-10-CM

## 2025-06-03 DIAGNOSIS — J31.0 CHRONIC RHINITIS: ICD-10-CM

## 2025-06-03 DIAGNOSIS — J34.2 DEVIATED NASAL SEPTUM: ICD-10-CM

## 2025-06-03 DIAGNOSIS — J34.829 NASAL VALVE COLLAPSE, UNSPECIFIED: ICD-10-CM

## 2025-06-03 DIAGNOSIS — H90.3 SENSORINEURAL HEARING LOSS, BILATERAL: ICD-10-CM

## 2025-06-03 PROCEDURE — 99214 OFFICE O/P EST MOD 30 MIN: CPT

## 2025-06-03 RX ORDER — CIPROFLOXACIN AND DEXAMETHASONE 3; 1 MG/ML; MG/ML
0.3-0.1 SUSPENSION/ DROPS AURICULAR (OTIC) TWICE DAILY
Qty: 1 | Refills: 5 | Status: ACTIVE | COMMUNITY
Start: 2025-06-03 | End: 1900-01-01

## 2025-07-21 ENCOUNTER — OFFICE (OUTPATIENT)
Dept: URBAN - METROPOLITAN AREA CLINIC 109 | Facility: CLINIC | Age: 80
Setting detail: OPHTHALMOLOGY
End: 2025-07-21
Payer: MEDICARE

## 2025-07-21 DIAGNOSIS — H52.4: ICD-10-CM

## 2025-07-21 DIAGNOSIS — H40.043: ICD-10-CM

## 2025-07-21 DIAGNOSIS — B00.52: ICD-10-CM

## 2025-07-21 DIAGNOSIS — H26.492: ICD-10-CM

## 2025-07-21 PROCEDURE — 99213 OFFICE O/P EST LOW 20 MIN: CPT | Performed by: OPHTHALMOLOGY

## 2025-07-21 PROCEDURE — 92015 DETERMINE REFRACTIVE STATE: CPT | Performed by: OPHTHALMOLOGY

## 2025-07-21 PROCEDURE — 92133 CPTRZD OPH DX IMG PST SGM ON: CPT | Performed by: OPHTHALMOLOGY

## 2025-07-21 ASSESSMENT — REFRACTION_AUTOREFRACTION
OS_CYLINDER: -1.00
OS_SPHERE: +1.75
OD_SPHERE: +1.00
OS_AXIS: 087
OD_AXIS: 099
OD_CYLINDER: -1.75

## 2025-07-21 ASSESSMENT — REFRACTION_MANIFEST
OS_CYLINDER: -1.00
OS_VA1: 20/20
OD_VA1: 20/20
OD_CYLINDER: -1.00
OS_ADD: +2.50
OS_AXIS: 087
OD_AXIS: 099
OD_SPHERE: +0.50
OS_SPHERE: +1.25
OD_ADD: +2.50

## 2025-07-21 ASSESSMENT — REFRACTION_CURRENTRX
OD_CYLINDER: -0.75
OD_VPRISM_DIRECTION: BF
OD_OVR_VA: 20/
OS_AXIS: 084
OS_OVR_VA: 20/
OS_ADD: +2.50
OD_AXIS: 094
OS_CYLINDER: -0.75
OS_SPHERE: +1.00
OS_VPRISM_DIRECTION: BF
OD_ADD: +2.50
OD_SPHERE: +0.50

## 2025-07-21 ASSESSMENT — KERATOMETRY
OD_AXISANGLE_DEGREES: 11
OS_AXISANGLE_DEGREES: 171
OD_K2POWER_DIOPTERS: 44.00
OS_K2POWER_DIOPTERS: 43.00
OS_K1POWER_DIOPTERS: 42.25
OD_K1POWER_DIOPTERS: 42.50

## 2025-07-21 ASSESSMENT — VISUAL ACUITY
OD_BCVA: 20/25-2
OS_BCVA: 20/25-2

## 2025-07-21 ASSESSMENT — PACHYMETRY
OS_CT_UM: 567
OS_CT_CORRECTION: -1
OD_CT_UM: 552
OD_CT_CORRECTION: -1

## 2025-07-21 ASSESSMENT — CONFRONTATIONAL VISUAL FIELD TEST (CVF)
OS_FINDINGS: FULL
OD_FINDINGS: FULL

## 2025-07-21 ASSESSMENT — TONOMETRY
OD_IOP_MMHG: 17
OS_IOP_MMHG: 14

## 2025-08-12 ENCOUNTER — APPOINTMENT (OUTPATIENT)
Dept: OTOLARYNGOLOGY | Facility: CLINIC | Age: 80
End: 2025-08-12
Payer: MEDICARE

## 2025-08-12 VITALS
WEIGHT: 135 LBS | SYSTOLIC BLOOD PRESSURE: 135 MMHG | BODY MASS INDEX: 21.69 KG/M2 | HEART RATE: 67 BPM | DIASTOLIC BLOOD PRESSURE: 79 MMHG | HEIGHT: 66 IN

## 2025-08-12 DIAGNOSIS — J34.89 OTHER SPECIFIED DISORDERS OF NOSE AND NASAL SINUSES: ICD-10-CM

## 2025-08-12 PROCEDURE — 30469Z: CUSTOM

## 2025-08-12 PROCEDURE — 30117 REMOVAL OF INTRANASAL LESION: CPT | Mod: RT

## 2025-08-12 PROCEDURE — 30801 ABLATE INF TURBINATE SUPERF: CPT

## 2025-08-12 RX ORDER — MUPIROCIN 20 MG/G
2 OINTMENT TOPICAL 3 TIMES DAILY
Qty: 1 | Refills: 0 | Status: ACTIVE | COMMUNITY
Start: 2025-08-12 | End: 1900-01-01

## 2025-08-22 ENCOUNTER — APPOINTMENT (OUTPATIENT)
Dept: OTOLARYNGOLOGY | Facility: CLINIC | Age: 80
End: 2025-08-22
Payer: MEDICARE

## 2025-08-22 VITALS
HEIGHT: 66 IN | WEIGHT: 135 LBS | DIASTOLIC BLOOD PRESSURE: 79 MMHG | SYSTOLIC BLOOD PRESSURE: 150 MMHG | HEART RATE: 68 BPM | BODY MASS INDEX: 21.69 KG/M2

## 2025-08-22 DIAGNOSIS — J34.3 HYPERTROPHY OF NASAL TURBINATES: ICD-10-CM

## 2025-08-22 DIAGNOSIS — J34.2 DEVIATED NASAL SEPTUM: ICD-10-CM

## 2025-08-22 DIAGNOSIS — J31.0 CHRONIC RHINITIS: ICD-10-CM

## 2025-08-22 DIAGNOSIS — K13.79 OTHER LESIONS OF ORAL MUCOSA: ICD-10-CM

## 2025-08-22 DIAGNOSIS — H90.3 SENSORINEURAL HEARING LOSS, BILATERAL: ICD-10-CM

## 2025-08-22 DIAGNOSIS — J34.829 NASAL VALVE COLLAPSE, UNSPECIFIED: ICD-10-CM

## 2025-08-22 PROCEDURE — 99213 OFFICE O/P EST LOW 20 MIN: CPT | Mod: 24

## (undated) DEVICE — DRSG 2X2

## (undated) DEVICE — FOLEY HOLDER STATLOCK 2 WAY ADULT

## (undated) DEVICE — SYR ALLIANCE II INFLATION 60ML

## (undated) DEVICE — ELCTR GROUNDING PAD ADULT COVIDIEN

## (undated) DEVICE — XI ARM DISSECTOR CURVED BIPOLAR 8MM

## (undated) DEVICE — WARMING BLANKET LOWER ADULT

## (undated) DEVICE — VENODYNE/SCD SLEEVE CALF MEDIUM

## (undated) DEVICE — SENSOR O2 FINGER ADULT

## (undated) DEVICE — SOL BAG NS 0.9% 1000ML

## (undated) DEVICE — WARMING BLANKET FULL ADULT

## (undated) DEVICE — SYR SLIP 10CC

## (undated) DEVICE — UNDERPAD LINEN SAVER 23 X 36"

## (undated) DEVICE — DRSG 4 X 8

## (undated) DEVICE — BALLOON US ENDO

## (undated) DEVICE — GOWN IMPERV XL

## (undated) DEVICE — GLV 7.5 PROTEXIS (WHITE)

## (undated) DEVICE — STAPLER SKIN PROXIMATE

## (undated) DEVICE — SUT VICRYL 2-0 27" UR-6

## (undated) DEVICE — CATH IV SAFE BC 22G X 1" (BLUE)

## (undated) DEVICE — SOL IRR BAG H2O 1000ML

## (undated) DEVICE — PREP CHLORAPREP HI-LITE ORANGE 26ML

## (undated) DEVICE — VESSEL LOOP MAXI-YELLOW  0.120" X 16"

## (undated) DEVICE — SUT SILK 2-0 30" SH

## (undated) DEVICE — SYR LUER SLIP TIP 50CC

## (undated) DEVICE — TUBING IV EXTENSION MACRO W CLAVE 7"

## (undated) DEVICE — CLEANER FOR ELCTR TIP

## (undated) DEVICE — DRAPE MAJOR ABDOMINAL W POUCHES

## (undated) DEVICE — DRAPE TOWEL BLUE 17" X 24"

## (undated) DEVICE — SUT VLOC PBT 0 9" GS-22 BLUE

## (undated) DEVICE — PACK IV START WITH CHG

## (undated) DEVICE — DRSG CURITY GAUZE SPONGE 4 X 4" 12-PLY NON-STERILE

## (undated) DEVICE — PACK ROBOTIC

## (undated) DEVICE — DRSG DERMABOND 0.7ML

## (undated) DEVICE — TUBING HYBRID CO2

## (undated) DEVICE — SYR IV FLUSH SALINE 10ML 30/TY

## (undated) DEVICE — TUBING AIRSEAL TRI-LUMEN FILTERED

## (undated) DEVICE — BITE BLOCK ADULT 20 X 27MM (GREEN)

## (undated) DEVICE — SUT MONOCRYL 4-0 27" PS-2 UNDYED

## (undated) DEVICE — TUBING ALARIS PUMP MODULE NON-DEHP

## (undated) DEVICE — DRAPE GENERAL ENDOSCOPY

## (undated) DEVICE — VALVE ENDO SURESEAL II 0-5FR

## (undated) DEVICE — FORCEP RADIAL JAW 4 W NDL 2.4MM 2.8MM 240CM ORANGE DISP

## (undated) DEVICE — TUBING SUCTION CONN 6FT STERILE

## (undated) DEVICE — SUT PROLENE 1 30" CTX

## (undated) DEVICE — XI VESSEL SEALER

## (undated) DEVICE — XI ARM FORCEP FENESTRATED BIPOLAR 8MM

## (undated) DEVICE — DENTURE CUP PINK

## (undated) DEVICE — TAPE SILK 3"

## (undated) DEVICE — SOL IRR BAG NS 0.9% 1000ML

## (undated) DEVICE — TUBING SUCTION 20FT

## (undated) DEVICE — XI ARM FORCEP CADIERE 8MM

## (undated) DEVICE — XI ARM GRASPER TIP UP FENESTRATED

## (undated) DEVICE — ELCTR BOVIE TIP BLADE INSULATED 4" EDGE

## (undated) DEVICE — XI CORD MONOPOLAR CAUTERY (GREEN)

## (undated) DEVICE — Device

## (undated) DEVICE — TUBING ERBE CO2 OLYMPUS CONNECTOR

## (undated) DEVICE — GLV 8 PROTEXIS (WHITE)

## (undated) DEVICE — SUCTION YANKAUER TAPERED BULBOUS NO VENT

## (undated) DEVICE — SOL IRR POUR NS 0.9% 1000ML

## (undated) DEVICE — XI SEAL UNIV 5- 8 MM

## (undated) DEVICE — TROCAR COVIDIEN VERSAPORT BLADELESS OPTICAL 5MM STANDARD

## (undated) DEVICE — DRAIN PENROSE .25" X 18" SILICONE

## (undated) DEVICE — TUBING CONNECTING 6MM 20FT

## (undated) DEVICE — DRAPE TOWEL BLUE STICKY

## (undated) DEVICE — SUCTION YANKAUER NO CONTROL VENT

## (undated) DEVICE — XI DRAPE ARM

## (undated) DEVICE — TUBING IV SET GRAVITY 3Y 100" MACRO

## (undated) DEVICE — XI DRAPE COLUMN

## (undated) DEVICE — CATH IV SAFE BC 20G X 1.16" (PINK)

## (undated) DEVICE — XI CORD BIPOLAR CAUTERY (BLUE)

## (undated) DEVICE — XI SEAL UNIVERSIAL 5-12MM

## (undated) DEVICE — SOL IRR POUR H2O 1000ML

## (undated) DEVICE — MASK PROCED EARLOOP 50/BX LRC COVID ADD

## (undated) DEVICE — TROCAR SURGIQUEST AIRSEAL 12MMX100MM

## (undated) DEVICE — MASK PROC EAR LOOP